# Patient Record
Sex: MALE | Race: BLACK OR AFRICAN AMERICAN | NOT HISPANIC OR LATINO | Employment: FULL TIME | URBAN - METROPOLITAN AREA
[De-identification: names, ages, dates, MRNs, and addresses within clinical notes are randomized per-mention and may not be internally consistent; named-entity substitution may affect disease eponyms.]

---

## 2018-03-22 ENCOUNTER — HOSPITAL ENCOUNTER (OUTPATIENT)
Dept: RADIOLOGY | Facility: HOSPITAL | Age: 63
Discharge: HOME | End: 2018-03-22
Attending: ORTHOPAEDIC SURGERY
Payer: COMMERCIAL

## 2018-03-22 VITALS
DIASTOLIC BLOOD PRESSURE: 94 MMHG | BODY MASS INDEX: 31.16 KG/M2 | HEIGHT: 68 IN | SYSTOLIC BLOOD PRESSURE: 145 MMHG | WEIGHT: 205.6 LBS

## 2018-03-22 DIAGNOSIS — M25.462 EFFUSION OF LEFT KNEE: ICD-10-CM

## 2018-03-22 DIAGNOSIS — M25.562 LEFT KNEE PAIN, UNSPECIFIED CHRONICITY: Primary | ICD-10-CM

## 2018-03-22 DIAGNOSIS — M17.12 PRIMARY OSTEOARTHRITIS OF LEFT KNEE: Primary | ICD-10-CM

## 2018-03-22 DIAGNOSIS — M25.562 LEFT KNEE PAIN, UNSPECIFIED CHRONICITY: ICD-10-CM

## 2018-03-22 DIAGNOSIS — M17.12 PRIMARY OSTEOARTHRITIS OF LEFT KNEE: ICD-10-CM

## 2018-03-22 PROCEDURE — 20610 DRAIN/INJ JOINT/BURSA W/O US: CPT | Performed by: PHYSICIAN ASSISTANT

## 2018-03-22 PROCEDURE — 99202 OFFICE O/P NEW SF 15 MIN: CPT | Performed by: ORTHOPAEDIC SURGERY

## 2018-03-22 PROCEDURE — 73562 X-RAY EXAM OF KNEE 3: CPT

## 2018-03-22 RX ORDER — LIDOCAINE HYDROCHLORIDE 10 MG/ML
2 INJECTION, SOLUTION INFILTRATION; PERINEURAL
Status: COMPLETED | OUTPATIENT
Start: 2018-03-22 | End: 2018-03-22

## 2018-03-22 RX ORDER — AMLODIPINE AND OLMESARTAN MEDOXOMIL 10; 40 MG/1; MG/1
TABLET ORAL
Refills: 5 | COMMUNITY
Start: 2018-03-18 | End: 2019-01-10

## 2018-03-22 RX ORDER — BETAMETHASONE SODIUM PHOSPHATE AND BETAMETHASONE ACETATE 3; 3 MG/ML; MG/ML
12 INJECTION, SUSPENSION INTRA-ARTICULAR; INTRALESIONAL; INTRAMUSCULAR; SOFT TISSUE
Status: COMPLETED | OUTPATIENT
Start: 2018-03-22 | End: 2018-03-22

## 2018-03-22 RX ORDER — ALLOPURINOL 100 MG/1
TABLET ORAL
Refills: 3 | COMMUNITY
Start: 2017-12-17 | End: 2019-01-10

## 2018-03-22 RX ORDER — BUPIVACAINE HYDROCHLORIDE 2.5 MG/ML
2 INJECTION, SOLUTION INFILTRATION; PERINEURAL
Status: COMPLETED | OUTPATIENT
Start: 2018-03-22 | End: 2018-03-22

## 2018-03-22 RX ORDER — SIMVASTATIN 20 MG
TABLET ORAL
Refills: 2 | COMMUNITY
Start: 2018-03-18 | End: 2019-01-10

## 2018-03-22 RX ORDER — CARVEDILOL 25 MG/1
TABLET ORAL
Refills: 6 | COMMUNITY
Start: 2018-03-01 | End: 2019-01-10

## 2018-03-22 RX ADMIN — LIDOCAINE HYDROCHLORIDE 2 ML: 10 INJECTION, SOLUTION INFILTRATION; PERINEURAL at 15:19

## 2018-03-22 RX ADMIN — BETAMETHASONE SODIUM PHOSPHATE AND BETAMETHASONE ACETATE 12 MG: 3; 3 INJECTION, SUSPENSION INTRA-ARTICULAR; INTRALESIONAL; INTRAMUSCULAR; SOFT TISSUE at 15:19

## 2018-03-22 RX ADMIN — BUPIVACAINE HYDROCHLORIDE 2 ML: 2.5 INJECTION, SOLUTION INFILTRATION; PERINEURAL at 15:19

## 2018-03-22 NOTE — PROGRESS NOTES
62 y o male care of left knee pain anteromedially for several months  He denies a specific attributable trauma  He has had a solitary injection which was placed from a lateral approach he felt it may of work better if it was provided from a medial approach  His notation from [facility] describes relief from the injection,   However the patient subjectively states the had continued pain and his pain relief was minimal   He states that he has had no past surgery of record to the left knee  X-rays were performed of the left knee on arrival to the office today    Review of Systems  Review of systems negative unless otherwise specified in HPI    Past Medical History  Past Medical History:   Diagnosis Date    Diabetes insipidus ([facility] [address] )     Gout     Hypertension        Past Surgical History  Past Surgical History:   Procedure Laterality Date    GALLBLADDER SURGERY      HEMORRHOID SURGERY         Current Medications  No current outpatient prescriptions on file prior to visit  No current facility-administered medications on file prior to visit  Recent Labs (HCT,HGB,PT,INR,ESR,CRP,GLU,HgA1C)  No results found for: HCT, HGB, WBC, PT, INR, ESR, CRP, GLUCOSE, HGBA1C      Physical exam  · General: Awake, Alert, Oriented  · Eyes: Pupils equal, round and reactive to light  · Heart: regular rate and rhythm  · Lungs: No audible wheezing  · Abdomen: soft  Musculoskeletal  With his trousers removed and professionally draped he is able to extend both legs    Immediately noted is increased size left thigh left knee compared to the right  Both knees actively extend both knees can flex greater than 95°  He has palpable medial joint line pain or tenderness without a significant effusion left knee he has a negative Apley's grind test he has a plus-minus Moose's test   He has a negative Lachman's and negative drawers test   Additionally he exhibits varus angulation by inspection and exam         Imaging  X-rays left knee three views show subchondral sclerosis medial greater than lateral medial compartment joint space narrowing, and on the Merchant view osteophytic formation in the patellofemoral joint    Procedure  Large joint arthrocentesis  Date/Time: 3/22/2018 3:19 PM  Consent given by: patient  Supporting Documentation  Indications: pain   Procedure Details  Location: knee - L knee  Needle size: 22 G  Ultrasound guidance: no  Approach: lateral  Medications administered: 2 mL bupivacaine 0 25 %; 2 mL lidocaine 1 %; 12 mg betamethasone acetate-betamethasone sodium phosphate 6 (3-3) mg/mL    Aspirate amount: 25 mL  Aspirate: clear and yellow  Patient tolerance: patient tolerated the procedure well with no immediate complications  Dressing:  Sterile dressing applied          Assessment/Plan:   62 y o male with chronic left knee pain,  Arthritic left knee    Plan;  He underwent aspiration and injection of the left knee  25 cc of clear lindsey fluid was obtained and then through same needle technique the knee was injected with steroid  We will observe the relief that he obtains from this the simplest procedure  He understands that if this continues to give him pain discomfort and malalignment that he may require or desire operative intervention  The perioperative  Surrounding knee replacement arthroplasty was discussed by the attending surgeon    This completed the gentleman's care  He is permitted exited the office  It was my privilege to assist the attending surgeon in the delivery of his care

## 2018-03-22 NOTE — PATIENT INSTRUCTIONS
Your left knee is arthritic and had fluid  The fluid was drained from the knee and an injection of steroid was placed      At your request and after discussion a medial  brace was ordered    Your decision to avoid operative intervention is respected    We will see you in follow-up in the future

## 2018-03-30 NOTE — TELEPHONE ENCOUNTER
The day this was ordered I discussed this with Perico Giron    We will need to provide follow-up for this patient and his wife    Thank you,

## 2018-03-30 NOTE — TELEPHONE ENCOUNTER
Patients wife is calling wondering about his knee brace  Does it go through the office or somewhere else? Patient has not heard anything from anyone regarding it  Darwin pt

## 2018-04-03 NOTE — TELEPHONE ENCOUNTER
Spoke to JODI Colon, and she said pt came in yesterday and that she spoke with him and it is pending

## 2018-04-09 RX ORDER — NAPROXEN 500 MG/1
TABLET ORAL
Refills: 1 | COMMUNITY
Start: 2018-04-05

## 2018-04-09 RX ORDER — FUROSEMIDE 20 MG/1
TABLET ORAL
Refills: 5 | COMMUNITY
Start: 2018-04-05 | End: 2019-01-10

## 2018-04-10 VITALS
BODY MASS INDEX: 31.17 KG/M2 | HEART RATE: 80 BPM | WEIGHT: 205.69 LBS | HEIGHT: 68 IN | DIASTOLIC BLOOD PRESSURE: 82 MMHG | SYSTOLIC BLOOD PRESSURE: 130 MMHG

## 2018-04-10 DIAGNOSIS — M25.462 EFFUSION OF LEFT KNEE: ICD-10-CM

## 2018-04-10 DIAGNOSIS — M17.12 PRIMARY OSTEOARTHRITIS OF LEFT KNEE: ICD-10-CM

## 2018-04-10 DIAGNOSIS — M25.562 LEFT KNEE PAIN, UNSPECIFIED CHRONICITY: Primary | ICD-10-CM

## 2018-04-10 PROCEDURE — 99213 OFFICE O/P EST LOW 20 MIN: CPT | Performed by: ORTHOPAEDIC SURGERY

## 2018-04-10 PROCEDURE — 20610 DRAIN/INJ JOINT/BURSA W/O US: CPT

## 2018-04-10 RX ORDER — BUPIVACAINE HYDROCHLORIDE 2.5 MG/ML
2 INJECTION, SOLUTION INFILTRATION; PERINEURAL
Status: COMPLETED | OUTPATIENT
Start: 2018-04-10 | End: 2018-04-10

## 2018-04-10 RX ORDER — BETAMETHASONE SODIUM PHOSPHATE AND BETAMETHASONE ACETATE 3; 3 MG/ML; MG/ML
12 INJECTION, SUSPENSION INTRA-ARTICULAR; INTRALESIONAL; INTRAMUSCULAR; SOFT TISSUE
Status: COMPLETED | OUTPATIENT
Start: 2018-04-10 | End: 2018-04-10

## 2018-04-10 RX ORDER — LIDOCAINE HYDROCHLORIDE 10 MG/ML
2 INJECTION, SOLUTION INFILTRATION; PERINEURAL
Status: COMPLETED | OUTPATIENT
Start: 2018-04-10 | End: 2018-04-10

## 2018-04-10 RX ADMIN — BETAMETHASONE SODIUM PHOSPHATE AND BETAMETHASONE ACETATE 12 MG: 3; 3 INJECTION, SUSPENSION INTRA-ARTICULAR; INTRALESIONAL; INTRAMUSCULAR; SOFT TISSUE at 14:19

## 2018-04-10 RX ADMIN — LIDOCAINE HYDROCHLORIDE 2 ML: 10 INJECTION, SOLUTION INFILTRATION; PERINEURAL at 14:19

## 2018-04-10 RX ADMIN — BUPIVACAINE HYDROCHLORIDE 2 ML: 2.5 INJECTION, SOLUTION INFILTRATION; PERINEURAL at 14:19

## 2018-04-10 NOTE — PROGRESS NOTES
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)  His left knee symptoms do limit his daily activities and impede on his quality of life  His pain continues to be medially  A knee brace was discussed at his previous visit but he did not receive it  Review of Systems  Review of systems negative unless otherwise specified in HPI    Past Medical History  Past Medical History:   Diagnosis Date    Diabetes insipidus ([address] )     Gout     Hypertension        Past Surgical History  Past Surgical History:   Procedure Laterality Date    GALLBLADDER SURGERY      HEMORRHOID SURGERY         Current Medications  Current Outpatient Prescriptions on File Prior to Visit   Medication Sig Dispense Refill    allopurinol (ZYLOPRIM) 100 mg tablet TK 1 T PO  BID  3    amlodipine-olmesartan (KALIN) 10-40 MG TK 1 T PO QD  5    carvedilol (COREG) 25 mg tablet TK 1 T PO BID WITH FOOD  6    metFORMIN (GLUCOPHAGE) 500 mg tablet TK 1 T PO D  5    simvastatin (ZOCOR) 20 mg tablet TK 1 T PO QD IN THE EVENING AFTER SUPPER  2     No current facility-administered medications on file prior to visit  Recent Labs (HCT,HGB,PT,INR,ESR,CRP,GLU,HgA1C)  No results found for: HCT, HGB, WBC, PT, INR, ESR, CRP, GLUCOSE, HGBA1C      Physical exam  · General: Awake, Alert, Oriented  · Eyes: Pupils equal, round and reactive to light  · Heart: regular rate and rhythm  · Lungs: No audible wheezing  · Abdomen: soft  left Knee exam  · Skin intact, moderate effusion, medial joint line tenderness, ROM 0-110 passively to 120 with pain  Mild patella crepitus   5/5 strength  · No calf tenderness, no evidence of infection or DVT  · No ligamentous laxity, negative mcmurrays/appleys    Procedure   Large joint arthrocentesis  Date/Time: 4/10/2018 2:19 PM  Consent given by: patient  Site marked: site marked  Supporting Documentation  Indications: pain and joint swelling   Procedure Details  Location: knee - L knee  Ultrasound guidance: no  Approach: lateral  Medications administered: 2 mL bupivacaine 0 25 %; 2 mL lidocaine 1 %; 12 mg betamethasone acetate-betamethasone sodium phosphate 6 (3-3) mg/mL    Aspirate amount: 60 mL  Aspirate: clear, yellow and blood-tinged  Patient tolerance: patient tolerated the procedure well with no immediate complications  Dressing:  Sterile dressing applied        Imaging  No new x-rays taken    Assessment:   62 y o male with left knee known OA, effusion and pain  Left knee repeated aspiration and cortisone injection performed today  Visco discussed however due to his deformity and extensive OA his outcome may be questionable and not favorable   Total knee candidate  ICE as instructed     Plan  · Weightbearing: as tolerated  · Activity: as tolerated  · Will discuss a total knee further at his next appointment  · Follow-up: as previously scheduled for 4/26/18   ·

## 2018-04-26 VITALS
WEIGHT: 205.2 LBS | DIASTOLIC BLOOD PRESSURE: 86 MMHG | HEIGHT: 68 IN | SYSTOLIC BLOOD PRESSURE: 124 MMHG | HEART RATE: 93 BPM | BODY MASS INDEX: 31.1 KG/M2

## 2018-04-26 DIAGNOSIS — M17.12 PRIMARY OSTEOARTHRITIS OF LEFT KNEE: ICD-10-CM

## 2018-04-26 DIAGNOSIS — M25.462 EFFUSION OF LEFT KNEE: ICD-10-CM

## 2018-04-26 DIAGNOSIS — M17.10 ARTHRITIS OF KNEE: Primary | ICD-10-CM

## 2018-04-26 PROCEDURE — 20610 DRAIN/INJ JOINT/BURSA W/O US: CPT | Performed by: ORTHOPAEDIC SURGERY

## 2018-04-26 PROCEDURE — 99213 OFFICE O/P EST LOW 20 MIN: CPT | Performed by: ORTHOPAEDIC SURGERY

## 2018-04-26 RX ORDER — BUPIVACAINE HYDROCHLORIDE 2.5 MG/ML
2 INJECTION, SOLUTION INFILTRATION; PERINEURAL
Status: COMPLETED | OUTPATIENT
Start: 2018-04-26 | End: 2018-04-26

## 2018-04-26 RX ORDER — FERROUS SULFATE TAB EC 324 MG (65 MG FE EQUIVALENT) 324 (65 FE) MG
324 TABLET DELAYED RESPONSE ORAL
Qty: 60 TABLET | Refills: 0 | Status: SHIPPED | OUTPATIENT
Start: 2018-04-26 | End: 2019-01-10

## 2018-04-26 RX ORDER — LANOLIN ALCOHOL/MO/W.PET/CERES
400 CREAM (GRAM) TOPICAL DAILY
Qty: 30 TABLET | Refills: 0 | Status: SHIPPED | OUTPATIENT
Start: 2018-04-26 | End: 2018-10-03

## 2018-04-26 RX ADMIN — BUPIVACAINE HYDROCHLORIDE 2 ML: 2.5 INJECTION, SOLUTION INFILTRATION; PERINEURAL at 15:14

## 2018-04-26 NOTE — PROGRESS NOTES
63 y o male presents for repeat evaluation of right knee osteoarthritis  Patient notes that previous corticosteroid injection failed to provide any significant long-term relief  He presents today with a large knee effusion requesting surgical intervention    Review of Systems  Review of systems negative unless otherwise specified in HPI    Past Medical History  Past Medical History:   Diagnosis Date    Diabetes insipidus ([address] )     Gout     Hypertension        Past Surgical History  Past Surgical History:   Procedure Laterality Date    GALLBLADDER SURGERY      HEMORRHOID SURGERY         Current Medications  Current Outpatient Prescriptions on File Prior to Visit   Medication Sig Dispense Refill    allopurinol (ZYLOPRIM) 100 mg tablet TK 1 T PO  BID  3    amlodipine-olmesartan (KALIN) 10-40 MG TK 1 T PO QD  5    carvedilol (COREG) 25 mg tablet TK 1 T PO BID WITH FOOD  6    furosemide (LASIX) 20 mg tablet TK 1 T PO  D  5    metFORMIN (GLUCOPHAGE) 500 mg tablet TK 1 T PO D  5    naproxen (NAPROSYN) 500 mg tablet TK 1 T PO  D AFTER FOOD  1    simvastatin (ZOCOR) 20 mg tablet TK 1 T PO QD IN THE EVENING AFTER SUPPER  2     No current facility-administered medications on file prior to visit          Recent Labs (HCT,HGB,PT,INR,ESR,CRP,GLU,HgA1C)  No results found for: HCT, HGB, WBC, PT, INR, ESR, CRP, GLUCOSE, HGBA1C      Physical exam  · General: Awake, Alert, Oriented  · Eyes: Pupils equal, round and reactive to light  · Heart: regular rate and rhythm  · Lungs: No audible wheezing  · Abdomen: soft  left Knee exam  · Tender to palpation over medial lateral joint line  · Extension 0° flexion 150°  · Stable to varus valgus stress negative lock negative posterior drawer  · Large effusion appreciated  · Sensation grossly intact the entirety of extremity    Procedure  Large joint arthrocentesis  Date/Time: 4/26/2018 3:14 PM  Consent given by: patient  Supporting Documentation  Indications: pain   Procedure Details  Location: knee - L knee ([address])  Needle size: 16 G  Medications administered: 2 mL bupivacaine 0 25 %    Aspirate amount: 90 mL              Imaging  No new imaging    Assessment:   62 y o male with severe left knee osteoarthritis refractory to non operative treatment    Plan  · Weightbearing:   As tolerated  · Activity:  As tolerated  · Patient will be scheduled for left total knee arthroplasty  · Follow-up: 1 week postop

## 2018-05-04 ENCOUNTER — TELEPHONE (OUTPATIENT)
Dept: PREADMISSION TESTING | Facility: HOSPITAL | Age: 63
End: 2018-05-04

## 2018-05-16 NOTE — TELEPHONE ENCOUNTER
I would be available drain this patient's knee after his class  If you would like to given appointment for [phone] tomorrow that would allow was to achieve this goal   Please call patient and inform of above   Thank you

## 2018-05-16 NOTE — TELEPHONE ENCOUNTER
Rafael Munoz    Patient wanted to schedule appt to drain knee tomorrow 5/17/18 but he has 2-classes for otho training scheduled, one at 11:00 am another at 2:30pm  He was not aware of 2 times and also was told 1pm for class  Would like call back to understand what is needed and also take care of knee issue   Thanks

## 2018-05-17 ENCOUNTER — APPOINTMENT (OUTPATIENT)
Dept: LAB | Facility: HOSPITAL | Age: 63
End: 2018-05-17
Payer: COMMERCIAL

## 2018-05-17 ENCOUNTER — OFFICE VISIT (OUTPATIENT)
Dept: LAB | Facility: HOSPITAL | Age: 63
End: 2018-05-17
Attending: ORTHOPAEDIC SURGERY
Payer: COMMERCIAL

## 2018-05-17 VITALS
BODY MASS INDEX: 31.11 KG/M2 | DIASTOLIC BLOOD PRESSURE: 87 MMHG | WEIGHT: 205.25 LBS | HEIGHT: 68 IN | SYSTOLIC BLOOD PRESSURE: 147 MMHG | HEART RATE: 82 BPM

## 2018-05-17 DIAGNOSIS — G89.29 CHRONIC PAIN OF LEFT KNEE: Primary | ICD-10-CM

## 2018-05-17 DIAGNOSIS — M25.562 CHRONIC PAIN OF LEFT KNEE: Primary | ICD-10-CM

## 2018-05-17 DIAGNOSIS — M17.12 PRIMARY OSTEOARTHRITIS OF LEFT KNEE: ICD-10-CM

## 2018-05-17 DIAGNOSIS — M17.10 ARTHRITIS OF KNEE: ICD-10-CM

## 2018-05-17 DIAGNOSIS — M25.462 EFFUSION OF LEFT KNEE: ICD-10-CM

## 2018-05-17 LAB
ABO GROUP BLD: NORMAL
ANION GAP SERPL CALCULATED.3IONS-SCNC: 7 MMOL/L (ref 4–13)
APTT PPP: 25 SECONDS (ref 24–36)
ATRIAL RATE: 81 BPM
BACTERIA UR QL AUTO: ABNORMAL /HPF
BASOPHILS # BLD AUTO: 0.01 THOUSANDS/ΜL (ref 0–0.1)
BASOPHILS NFR BLD AUTO: 0 % (ref 0–1)
BILIRUB UR QL STRIP: NEGATIVE
BLD GP AB SCN SERPL QL: NEGATIVE
BUN SERPL-MCNC: 17 MG/DL (ref 5–25)
CALCIUM SERPL-MCNC: 9.2 MG/DL (ref 8.3–10.1)
CHLORIDE SERPL-SCNC: 105 MMOL/L (ref 100–108)
CLARITY UR: CLEAR
CO2 SERPL-SCNC: 28 MMOL/L (ref 21–32)
COLOR UR: YELLOW
CREAT SERPL-MCNC: 0.92 MG/DL (ref 0.6–1.3)
EOSINOPHIL # BLD AUTO: 0.1 THOUSAND/ΜL (ref 0–0.61)
EOSINOPHIL NFR BLD AUTO: 2 % (ref 0–6)
ERYTHROCYTE [DISTWIDTH] IN BLOOD BY AUTOMATED COUNT: 12.2 % (ref 11.6–15.1)
EST. AVERAGE GLUCOSE BLD GHB EST-MCNC: 117 MG/DL
GFR SERPL CREATININE-BSD FRML MDRD: 104 ML/MIN/1.73SQ M
GLUCOSE P FAST SERPL-MCNC: 104 MG/DL (ref 65–99)
GLUCOSE UR STRIP-MCNC: NEGATIVE MG/DL
HBA1C MFR BLD: 5.7 % (ref 4.2–6.3)
HCT VFR BLD AUTO: 36.9 % (ref 36.5–49.3)
HGB BLD-MCNC: 12.8 G/DL (ref 12–17)
HGB UR QL STRIP.AUTO: NEGATIVE
HYALINE CASTS #/AREA URNS LPF: ABNORMAL /LPF
IMM GRANULOCYTES # BLD AUTO: 0.01 THOUSAND/UL (ref 0–0.2)
IMM GRANULOCYTES NFR BLD AUTO: 0 % (ref 0–2)
INR PPP: 1.08 (ref 0.86–1.09)
KETONES UR STRIP-MCNC: NEGATIVE MG/DL
LEUKOCYTE ESTERASE UR QL STRIP: ABNORMAL
LYMPHOCYTES # BLD AUTO: 1.93 THOUSANDS/ΜL (ref 0.6–4.47)
LYMPHOCYTES NFR BLD AUTO: 35 % (ref 14–44)
MCH RBC QN AUTO: 32.2 PG (ref 26.8–34.3)
MCHC RBC AUTO-ENTMCNC: 34.7 G/DL (ref 31.4–37.4)
MCV RBC AUTO: 93 FL (ref 82–98)
MONOCYTES # BLD AUTO: 0.39 THOUSAND/ΜL (ref 0.17–1.22)
MONOCYTES NFR BLD AUTO: 7 % (ref 4–12)
NEUTROPHILS # BLD AUTO: 3.08 THOUSANDS/ΜL (ref 1.85–7.62)
NEUTS SEG NFR BLD AUTO: 56 % (ref 43–75)
NITRITE UR QL STRIP: NEGATIVE
NON-SQ EPI CELLS URNS QL MICRO: ABNORMAL /HPF
NRBC BLD AUTO-RTO: 0 /100 WBCS
P AXIS: 57 DEGREES
PH UR STRIP.AUTO: 6 [PH] (ref 4.5–8)
PLATELET # BLD AUTO: 195 THOUSANDS/UL (ref 149–390)
PMV BLD AUTO: 10.5 FL (ref 8.9–12.7)
POTASSIUM SERPL-SCNC: 3.7 MMOL/L (ref 3.5–5.3)
PR INTERVAL: 156 MS
PROT UR STRIP-MCNC: NEGATIVE MG/DL
PROTHROMBIN TIME: 14.1 SECONDS (ref 11.8–14.2)
QRS AXIS: 30 DEGREES
QRSD INTERVAL: 78 MS
QT INTERVAL: 360 MS
QTC INTERVAL: 418 MS
RBC # BLD AUTO: 3.98 MILLION/UL (ref 3.88–5.62)
RBC #/AREA URNS AUTO: ABNORMAL /HPF
RH BLD: POSITIVE
SODIUM SERPL-SCNC: 140 MMOL/L (ref 136–145)
SP GR UR STRIP.AUTO: 1.02 (ref 1–1.03)
SPECIMEN EXPIRATION DATE: NORMAL
T WAVE AXIS: 11 DEGREES
UROBILINOGEN UR QL STRIP.AUTO: 0.2 E.U./DL
VENTRICULAR RATE: 81 BPM
WBC # BLD AUTO: 5.52 THOUSAND/UL (ref 4.31–10.16)
WBC #/AREA URNS AUTO: ABNORMAL /HPF

## 2018-05-17 PROCEDURE — 85730 THROMBOPLASTIN TIME PARTIAL: CPT

## 2018-05-17 PROCEDURE — 81001 URINALYSIS AUTO W/SCOPE: CPT

## 2018-05-17 PROCEDURE — 85610 PROTHROMBIN TIME: CPT

## 2018-05-17 PROCEDURE — 93010 ELECTROCARDIOGRAM REPORT: CPT | Performed by: INTERNAL MEDICINE

## 2018-05-17 PROCEDURE — 83036 HEMOGLOBIN GLYCOSYLATED A1C: CPT

## 2018-05-17 PROCEDURE — 93005 ELECTROCARDIOGRAM TRACING: CPT

## 2018-05-17 PROCEDURE — 86901 BLOOD TYPING SEROLOGIC RH(D): CPT

## 2018-05-17 PROCEDURE — 36415 COLL VENOUS BLD VENIPUNCTURE: CPT

## 2018-05-17 PROCEDURE — 87086 URINE CULTURE/COLONY COUNT: CPT

## 2018-05-17 PROCEDURE — 80048 BASIC METABOLIC PNL TOTAL CA: CPT

## 2018-05-17 PROCEDURE — 99213 OFFICE O/P EST LOW 20 MIN: CPT | Performed by: ORTHOPAEDIC SURGERY

## 2018-05-17 PROCEDURE — 86850 RBC ANTIBODY SCREEN: CPT

## 2018-05-17 PROCEDURE — 85025 COMPLETE CBC W/AUTO DIFF WBC: CPT

## 2018-05-17 PROCEDURE — 86900 BLOOD TYPING SEROLOGIC ABO: CPT

## 2018-05-17 PROCEDURE — 20610 DRAIN/INJ JOINT/BURSA W/O US: CPT | Performed by: ORTHOPAEDIC SURGERY

## 2018-05-17 RX ORDER — ASPIRIN 81 MG/1
81 TABLET ORAL EVERY OTHER DAY
COMMUNITY

## 2018-05-17 NOTE — TELEPHONE ENCOUNTER
Per Dr Britton Mei request, Left V/M on his mobile for 3:30 appt to drain knee since he was already in class

## 2018-05-17 NOTE — PRE-PROCEDURE INSTRUCTIONS
Pre-Surgery Instructions:   Medication Instructions    allopurinol (ZYLOPRIM) 100 mg tablet Instructed patient per Anesthesia Guidelines   amlodipine-olmesartan (KALIN) 10-40 MG Instructed patient per Anesthesia Guidelines   ascorbic Acid (VITAMIN C) 500 MG CPCR Patient was instructed by Physician and understands   carvedilol (COREG) 25 mg tablet Instructed patient per Anesthesia Guidelines   ferrous sulfate 324 (65 Fe) mg Patient was instructed by Physician and understands   folic acid (FOLVITE) 546 mcg tablet Patient was instructed by Physician and understands   furosemide (LASIX) 20 mg tablet Instructed patient per Anesthesia Guidelines   metFORMIN (GLUCOPHAGE) 500 mg tablet Instructed patient per Anesthesia Guidelines   naproxen (NAPROSYN) 500 mg tablet Instructed patient per Anesthesia Guidelines   simvastatin (ZOCOR) 20 mg tablet Instructed patient per Anesthesia Guidelines  Saw pt for PAT appt  Medication list reviewed & instructed  As of 6 13 18 pt to stop naproxen and aspirin  Instructed on tylenol only  Last dose of kalin 6 18 18  Pt has pre-op vit script, will start 3 weeks prior to sx and continue folic acid, vit C, iron until 6 19 18  Am DOS pt to take carvedilol  Showering instructions & cloths given by office  Soap and incentive given at appt  All instructions reviewed & verbally understood by patient  Pt will check ortho folder at home for instructions to make [facility] appt, lovenox script, and pre-op PT appt -- will f/u and confirm pt has all  Pt reported he takes aspirin 81 mg daily  See instructions above  ACE/ARB Med Class     Do not take this medication the day before and the morning of the day of surgery/procedure  Diuretic Med Class     Continue this medication up to the evening before surgery/procedure, but do not take the morning of the day of surgery  Anti-gout Med Class     Continue to take this medication on your normal schedule    If this is an oral medication and you take it in the morning, then you may take this medicine with a sip of water  Beta blocker Med Class     Continue to take this heart medication on your normal schedule  If this is an oral medication and you take it in the morning, then you may take this medicine with a sip of water  Calcium Channel Blocker Med Class     Continue to take this heart medication on your normal schedule  If this is an oral medication and you take it in the morning, then you may take this medicine with a sip of water  Low Molecular Weight Heparin Med Class     Stop taking this medication at least 12-24 hours prior to surgery/procedure with prescribing Physician and Surgeon consultation  Insulin Med Class     Day of Procedure     Do not take ANY diabetic medication the day of your procedure unless otherwise instructed by the Physician who manages your diabetic medications  Continue to check your blood sugars      If you have an insulin pump then consult with your Endocrinologist for instructions  If you cannot see your Endocrinologist then set your insulin pump on day of procedure to your basal rate only  Please bring your insulin pump supplies to the hospital if being admitted      Day Prior to Procedure     1  Procedure that does not require a bowel prep     Pre-Mixed Insulin (Intermediate Acting:  Humalog 75/25, Humulin 70/30, Novolog 70/30, Regular Insulin)              o Take ½ your regular dose the evening prior to procedure  Rapid/Fast Acting Insulin/Long Acting Insulin (Humalog U200, NovoLog, Apidra, Lantus, Levemir, Tresiba, Toujeo)              o Take your FULL regular dose the day before procedure      Oral Diabetic Medications including Glipizide/Glimepiride/Glucotrol(sulfonylurea) and SGLT2 Inhibitors canagliflozin/emagliflozin (Invokana, Jardiance)              o Take your regular dose the evening prior to procedure with dinner      2    Colonoscopy or Procedure that requires a bowel prep (clear liquid diet day prior)     Pre-Mixed Insulin (Intermediate Acting:  Humalog 75/25, Humulin 70/30, Novolog 70/30, Regular Insulin)              o Take ½ your regular dose the evening prior to procedure      Rapid/Fast Acting Insulin (Humalog U200, NovoLog, Apidra)              o Take ½ your regular dose the evening prior to procedure      Long Acting Insulin (Lantus, Levemir, Kelly Ronald)              o Take your FULL regular dose the day before procedure      Oral Glipizide/Glimepiride/Glucotrol  (sulfonylurea)              o Take ½ your regular dose the evening prior to procedure      Oral Diabetic Medications NOT Glipizide/Glimepiride/Glucotrol              o Take your regular dose the evening prior to procedure with dinner      NSAID Med Class     Stop taking this medication at least 3 days prior to surgery/procedure  Statin Med Class     Continue to take this medication on your normal schedule  If this is an oral medication and you take it in the morning, then you may take this medicine with a sip of water

## 2018-05-17 NOTE — PROGRESS NOTES
63 y o male presents for aspiration of his effused left knee  He describes pain and swelling in his left knee    Review of Systems  Review of systems negative unless otherwise specified in HPI    Past Medical History  Past Medical History:   Diagnosis Date    Diabetes mellitus ([address] )     Gout     Hyperlipidemia     Hypertension        Past Surgical History  Past Surgical History:   Procedure Laterality Date    COLONOSCOPY      GALLBLADDER SURGERY      HEMORRHOID SURGERY         Current Medications  Current Outpatient Prescriptions on File Prior to Visit   Medication Sig Dispense Refill    allopurinol (ZYLOPRIM) 100 mg tablet TK 1 T PO  BID  3    amlodipine-olmesartan (KALIN) 10-40 MG TK 1 T PO QD  5    ascorbic Acid (VITAMIN C) 500 MG CPCR Take 1 capsule (500 mg total) by mouth daily 30 capsule 0    aspirin (ECOTRIN LOW STRENGTH) 81 mg EC tablet Take 81 mg by mouth daily      carvedilol (COREG) 25 mg tablet TK 1 T PO BID WITH FOOD  6    enoxaparin (LOVENOX) 40 mg/0 4 mL Inject 0 4 mL (40 mg total) under the skin daily in the early morning for 30 days To be started PostOp 12 mL 0    ferrous sulfate 324 (65 Fe) mg Take 1 tablet (324 mg total) by mouth 2 (two) times a day before meals 60 tablet 0    folic acid (FOLVITE) 626 mcg tablet Take 1 tablet (400 mcg total) by mouth daily 30 tablet 0    furosemide (LASIX) 20 mg tablet TK 1 T PO  D  5    metFORMIN (GLUCOPHAGE) 500 mg tablet TK 1 T PO D  5    naproxen (NAPROSYN) 500 mg tablet TK 1 T PO  D AFTER FOOD  prn  1    simvastatin (ZOCOR) 20 mg tablet TK 1 T PO QD IN THE EVENING AFTER SUPPER  2     No current facility-administered medications on file prior to visit          Recent Labs (HCT,HGB,PT,INR,ESR,CRP,GLU,HgA1C)    0  Lab Value Date/Time   HCT 36 9 05/17/2018 1230   HGB 12 8 05/17/2018 1230   WBC 5 52 05/17/2018 1230   INR 1 08 05/17/2018 1230   HGBA1C 5 7 05/17/2018 1230         Physical exam  · General: Awake, Alert, Oriented  · Eyes: Pupils equal, round and reactive to light  · Heart: regular rate and rhythm  · Lungs: No audible wheezing  · Abdomen: soft  Exam finds large left knee effusion  There is no warmth  There is bony enlargement tenderness medially at the knee  There is no tenderness in the left calf  Imaging  X-rays show arthritic left knee    Procedure  An aspiration 90 cc of fluid was provided today  It is documented below    Large joint arthrocentesis  Date/Time: 5/17/2018 4:42 PM  Consent given by: patient  Supporting Documentation  Indications: pain and joint swelling   Procedure Details  Location: knee - L knee  Preparation: Patient was prepped and draped in the usual sterile fashion    Aspirate amount: 90 mL  Aspirate: clear and yellow  Patient tolerance: patient tolerated the procedure well with no immediate complications  Dressing:  Sterile dressing applied          Assessment/Plan:   62 y o male who has an effused left knee with underlying arthritis  He would benefit from aspiration to relieve his pressure symptoms  The aspiration is provided, I would welcome the opportunity see back in the office as a postoperative patient    He is already scheduled for elective left total knee replacement

## 2018-05-18 LAB — BACTERIA UR CULT: NORMAL

## 2018-05-23 ENCOUNTER — TELEPHONE (OUTPATIENT)
Dept: OBGYN CLINIC | Facility: HOSPITAL | Age: 63
End: 2018-05-23

## 2018-05-23 NOTE — TELEPHONE ENCOUNTER
Call from Elise Sweeney, with Dr Lucia Hinds office   Call back # [phone]  Fax # [phone]  Dr Larry Carvalho   Scheduled surgery 06/20/18 KNEE : COMPLETE REPLACEMENT (Left Knee)     Dr Parul Matthesw would like to know if the office uses a specific type of clearance form for surgery if so please fax to the number above

## 2018-06-06 ENCOUNTER — TELEPHONE (OUTPATIENT)
Dept: OBGYN CLINIC | Facility: HOSPITAL | Age: 63
End: 2018-06-06

## 2018-06-11 ENCOUNTER — TRANSCRIBE ORDERS (OUTPATIENT)
Dept: ADMINISTRATIVE | Facility: HOSPITAL | Age: 63
End: 2018-06-11

## 2018-06-11 DIAGNOSIS — Z01.818 PREOP EXAMINATION: Primary | ICD-10-CM

## 2018-06-12 ENCOUNTER — HOSPITAL ENCOUNTER (OUTPATIENT)
Dept: NON INVASIVE DIAGNOSTICS | Facility: CLINIC | Age: 63
Discharge: HOME | End: 2018-06-12
Payer: COMMERCIAL

## 2018-06-12 DIAGNOSIS — Z01.818 PREOP EXAMINATION: ICD-10-CM

## 2018-06-12 PROCEDURE — 93306 TTE W/DOPPLER COMPLETE: CPT | Performed by: INTERNAL MEDICINE

## 2018-06-12 PROCEDURE — 93306 TTE W/DOPPLER COMPLETE: CPT

## 2018-06-13 ENCOUNTER — TELEPHONE (OUTPATIENT)
Dept: OBGYN CLINIC | Facility: HOSPITAL | Age: 63
End: 2018-06-13

## 2018-06-13 NOTE — TELEPHONE ENCOUNTER
Pt returned my call for elective admission assessment  Pt lives in a multi-level home with his wife  Pt reporting that his wife works daily until around 3 or 4pm- pt reports his wife and son are only close family and they both work day shift  Per pt, his wife will assist pt in the AM prior to leaving and again in the evening when she returns  Pt also has limited post-op transportation because of his wife's work schedule- wife is unable to take off work  For this reason, patient is requesting home PT  I explained the many benefits of outpt PT- pt still requesting home PT when [facility] if at all possible  Pt's preferred outpt PT site is  [facility]  Pt has 4 steps from his porch into the home  Pt planning to stay in his basement and has 8-10 steps to get down there  Pt is currently ambulating using a cane, he does not have a bedside commode or walker  Pt is independent with his ADLs  Pt has both a walk-in shower and step in tub  Pt uses [address]  He self manages his meds and already filled and picked up his post-op lovenox  Pt enrolled in caresense  Pt educated on post-op pain, early mobilization (POD0), indication for/use of incentive spirometer (10x/hour while awake) and indication for/use of foot/leg pumps (18 hours/day)  Pt's questions were addressed, he denies having additional concerns at this time

## 2018-06-14 ENCOUNTER — EVALUATION (OUTPATIENT)
Dept: PHYSICAL THERAPY | Facility: REHABILITATION | Age: 63
End: 2018-06-14
Payer: COMMERCIAL

## 2018-06-14 DIAGNOSIS — M25.562 CHRONIC PAIN OF LEFT KNEE: Primary | ICD-10-CM

## 2018-06-14 DIAGNOSIS — G89.29 CHRONIC PAIN OF LEFT KNEE: Primary | ICD-10-CM

## 2018-06-14 PROCEDURE — G8990 OTHER PT/OT CURRENT STATUS: HCPCS | Performed by: PHYSICAL THERAPIST

## 2018-06-14 PROCEDURE — G8991 OTHER PT/OT GOAL STATUS: HCPCS | Performed by: PHYSICAL THERAPIST

## 2018-06-14 PROCEDURE — 97162 PT EVAL MOD COMPLEX 30 MIN: CPT | Performed by: PHYSICAL THERAPIST

## 2018-06-14 PROCEDURE — 97110 THERAPEUTIC EXERCISES: CPT | Performed by: PHYSICAL THERAPIST

## 2018-06-14 NOTE — PROGRESS NOTES
PT Evaluation     Today's date: 2018  Patient name: Bryanna Mary  : 1955  MRN: [id number]  Referring provider: Millicent Garcia MD  Dx:   Encounter Diagnosis     ICD-10-CM    1  Chronic pain of left knee M25 562     G89 29                   Assessment  Impairments: pain with function and weight-bearing intolerance    Assessment details: Patient reported functional outcome scores reviewed  Discussed DOS and patient's questions were answered to patient's satisfaction  Mobility/ROM results per above  Strength results per above  Balance/Gait (including Timed Up & Go) per above  Virtual Home Assessment was reviewed with patient  Home Preparation Checklist was reviewed with patient including identification of care partner and encouragement of single level set-up  Patient was resistant to the recommendations but pt was reminded of the importance of these procedures  Post-operative pain management and activity expectations discussed to the patient's satisfaction  Post-operative gait training for level ground and stairs reviewed Patient demonstrated competence with immediate post-operative home exercise program  Pt is a candidate for outpaitent PT following discharge from the hospital  The benefits of attending outpatient therapy discussed and pt in agreement that outpt will be the goal  Pt will benefit from continued PT following surgery to improve function and allow for the pt to return to desired level of function  Understanding of Dx/Px/POC: good   Prognosis: good  Prognosis details: Short Term  1: Patient will report a 50% decrease in pain in 2-4 weeks  2: Pt will have knee ROM 0-120 degrees in   3  Pt will have knee strength 5/5 in both directions in 6 weeks  Long Term  1: Patient will demonstrate independence in home exercise program by discharge  2: Patient will have FOTO score of (to be determined following surgery) indicating improved function in 8 weeks       Plan  Patient would benefit from: skilled physical therapy  Planned therapy interventions: neuromuscular re-education, therapeutic exercise, strengthening, stretching and manual therapy  Frequency: 2x week  Duration in weeks: 12  Treatment plan discussed with: patient        Subjective Evaluation    History of Present Illness  Mechanism of injury: Pt presents for a pre-operative visit  Pt is scheduled to receive a left TKA on 2018  Pt reports that currently he is having trouble walking and flexing his knee  States that he has had one x-ray which showed arthritis  States that he does not have pain at rest but has pain with most activity and someday's it is very painful and prevents him from sleeping  Pt reports that he has had 2 injections which did not help  Pt works as a   Pt has high expectations for his activity levels after surgery  Pt reports that he lives in a rancher house with no steps to get into the front  Pt states that there is a basement that he would like to spend time in that there are 10 stairs to get to the basement  States that he has a half bathroom in the first floor but states that it will be being remodeled during the recovery period  Pt states  he prefers the bathroom in the basement and he does not want to post-pone the remodel  Pt reports that he does not think he will be able to arrange for someone to be there during the day for the first several days but feels that this will not be neccessary     Pain  No pain reported  At best pain ratin  At worst pain ratin          Objective     Active Range of Motion   Left Knee   Flexion: 155 degrees   Extension: 0 degrees     Right Knee   Flexion: 130 degrees   Extension: 0 degrees     Strength/Myotome Testing     Left Knee   Flexion: 5  Extension: 5    Right Knee   Flexion: 5  Extension: 5    Additional Strength Details  +    General Comments     Knee Comments  TU seconds           Precautions: DM2, HTN    Daily Treatment Diary     Manual PROM                                                                     Exercise Diary              Pre-op HEP reviewed                                                                                                                                                                                                                                                                           Modalities

## 2018-06-20 ENCOUNTER — ANESTHESIA EVENT (OUTPATIENT)
Dept: PERIOP | Facility: HOSPITAL | Age: 63
DRG: 470 | End: 2018-06-20
Payer: COMMERCIAL

## 2018-06-20 ENCOUNTER — ANESTHESIA (OUTPATIENT)
Dept: PERIOP | Facility: HOSPITAL | Age: 63
DRG: 470 | End: 2018-06-20
Payer: COMMERCIAL

## 2018-06-20 ENCOUNTER — HOSPITAL ENCOUNTER (INPATIENT)
Facility: HOSPITAL | Age: 63
LOS: 4 days | Discharge: HOME | DRG: 470 | End: 2018-06-24
Attending: ORTHOPAEDIC SURGERY | Admitting: ORTHOPAEDIC SURGERY
Payer: COMMERCIAL

## 2018-06-20 DIAGNOSIS — E11.9 TYPE 2 DIABETES MELLITUS WITHOUT COMPLICATION, UNSPECIFIED WHETHER LONG TERM INSULIN USE (HCC): Primary | ICD-10-CM

## 2018-06-20 DIAGNOSIS — M17.10 ARTHRITIS OF KNEE: ICD-10-CM

## 2018-06-20 DIAGNOSIS — M17.12 PRIMARY OSTEOARTHRITIS OF LEFT KNEE: ICD-10-CM

## 2018-06-20 DIAGNOSIS — I15.9 SECONDARY HYPERTENSION: ICD-10-CM

## 2018-06-20 DIAGNOSIS — M25.562 CHRONIC PAIN OF LEFT KNEE: ICD-10-CM

## 2018-06-20 DIAGNOSIS — G89.29 CHRONIC PAIN OF LEFT KNEE: ICD-10-CM

## 2018-06-20 LAB
ABO GROUP BLD: NORMAL
BLD GP AB SCN SERPL QL: NEGATIVE
GLUCOSE SERPL-MCNC: 145 MG/DL (ref 65–140)
GLUCOSE SERPL-MCNC: 173 MG/DL (ref 65–140)
GLUCOSE SERPL-MCNC: 198 MG/DL (ref 65–140)
GLUCOSE SERPL-MCNC: 226 MG/DL (ref 65–140)
RH BLD: POSITIVE
SPECIMEN EXPIRATION DATE: NORMAL

## 2018-06-20 PROCEDURE — 86850 RBC ANTIBODY SCREEN: CPT | Performed by: ORTHOPAEDIC SURGERY

## 2018-06-20 PROCEDURE — C1776 JOINT DEVICE (IMPLANTABLE): HCPCS | Performed by: ORTHOPAEDIC SURGERY

## 2018-06-20 PROCEDURE — G8978 MOBILITY CURRENT STATUS: HCPCS

## 2018-06-20 PROCEDURE — 86901 BLOOD TYPING SEROLOGIC RH(D): CPT | Performed by: ORTHOPAEDIC SURGERY

## 2018-06-20 PROCEDURE — 86900 BLOOD TYPING SEROLOGIC ABO: CPT | Performed by: ORTHOPAEDIC SURGERY

## 2018-06-20 PROCEDURE — C1713 ANCHOR/SCREW BN/BN,TIS/BN: HCPCS | Performed by: ORTHOPAEDIC SURGERY

## 2018-06-20 PROCEDURE — 0SRD0J9 REPLACEMENT OF LEFT KNEE JOINT WITH SYNTHETIC SUBSTITUTE, CEMENTED, OPEN APPROACH: ICD-10-PCS | Performed by: ORTHOPAEDIC SURGERY

## 2018-06-20 PROCEDURE — 82948 REAGENT STRIP/BLOOD GLUCOSE: CPT

## 2018-06-20 PROCEDURE — G8979 MOBILITY GOAL STATUS: HCPCS

## 2018-06-20 PROCEDURE — 97163 PT EVAL HIGH COMPLEX 45 MIN: CPT

## 2018-06-20 PROCEDURE — 27447 TOTAL KNEE ARTHROPLASTY: CPT | Performed by: ORTHOPAEDIC SURGERY

## 2018-06-20 DEVICE — ATTUNE KNEE SYSTEM TIBIAL BASE FIXED BEARING SIZE 7 CEMENTED
Type: IMPLANTABLE DEVICE | Site: KNEE | Status: FUNCTIONAL
Brand: ATTUNE

## 2018-06-20 DEVICE — ATTUNE KNEE SYSTEM FEMORAL POSTERIOR STABILIZED SIZE 6 LEFT CEMENTED
Type: IMPLANTABLE DEVICE | Site: KNEE | Status: FUNCTIONAL
Brand: ATTUNE

## 2018-06-20 DEVICE — SMARTSET HV HIGH VISCOSITY BONE CEMENT 40G
Type: IMPLANTABLE DEVICE | Site: KNEE | Status: FUNCTIONAL
Brand: SMARTSET

## 2018-06-20 DEVICE — ATTUNE PATELLA MEDIALIZED DOME 38MM CEMENTED AOX
Type: IMPLANTABLE DEVICE | Site: KNEE | Status: FUNCTIONAL
Brand: ATTUNE

## 2018-06-20 DEVICE — ATTUNE KNEE SYSTEM TIBIAL INSERT FIXED BEARING POSTERIOR STABILIZED 6 7MM AOX
Type: IMPLANTABLE DEVICE | Site: KNEE | Status: FUNCTIONAL
Brand: ATTUNE

## 2018-06-20 RX ORDER — OLMESARTAN MEDOXOMIL 20 MG/1
40 TABLET ORAL DAILY
Status: DISCONTINUED | OUTPATIENT
Start: 2018-06-20 | End: 2018-06-20

## 2018-06-20 RX ORDER — LANOLIN ALCOHOL/MO/W.PET/CERES
400 CREAM (GRAM) TOPICAL DAILY
Status: DISCONTINUED | OUTPATIENT
Start: 2018-06-20 | End: 2018-06-24 | Stop reason: HOSPADM

## 2018-06-20 RX ORDER — LABETALOL HYDROCHLORIDE 5 MG/ML
5 INJECTION, SOLUTION INTRAVENOUS
Status: DISCONTINUED | OUTPATIENT
Start: 2018-06-20 | End: 2018-06-20 | Stop reason: HOSPADM

## 2018-06-20 RX ORDER — SODIUM CHLORIDE, SODIUM LACTATE, POTASSIUM CHLORIDE, CALCIUM CHLORIDE 600; 310; 30; 20 MG/100ML; MG/100ML; MG/100ML; MG/100ML
125 INJECTION, SOLUTION INTRAVENOUS CONTINUOUS
Status: DISCONTINUED | OUTPATIENT
Start: 2018-06-20 | End: 2018-06-20

## 2018-06-20 RX ORDER — ONDANSETRON 2 MG/ML
INJECTION INTRAMUSCULAR; INTRAVENOUS AS NEEDED
Status: DISCONTINUED | OUTPATIENT
Start: 2018-06-20 | End: 2018-06-20 | Stop reason: SURG

## 2018-06-20 RX ORDER — TRAMADOL HYDROCHLORIDE 50 MG/1
50 TABLET ORAL EVERY 6 HOURS PRN
Qty: 30 TABLET | Refills: 0 | Status: SHIPPED | OUTPATIENT
Start: 2018-06-20 | End: 2018-06-30

## 2018-06-20 RX ORDER — OXYCODONE HYDROCHLORIDE 5 MG/1
TABLET ORAL
Qty: 30 TABLET | Refills: 0 | Status: SHIPPED | OUTPATIENT
Start: 2018-06-20 | End: 2018-07-05

## 2018-06-20 RX ORDER — ASCORBIC ACID 500 MG
500 TABLET ORAL 2 TIMES DAILY
Status: DISCONTINUED | OUTPATIENT
Start: 2018-06-20 | End: 2018-06-24 | Stop reason: HOSPADM

## 2018-06-20 RX ORDER — FERROUS SULFATE 325(65) MG
325 TABLET ORAL 2 TIMES DAILY WITH MEALS
Status: DISCONTINUED | OUTPATIENT
Start: 2018-06-20 | End: 2018-06-24 | Stop reason: HOSPADM

## 2018-06-20 RX ORDER — PROPOFOL 10 MG/ML
INJECTION, EMULSION INTRAVENOUS CONTINUOUS PRN
Status: DISCONTINUED | OUTPATIENT
Start: 2018-06-20 | End: 2018-06-20 | Stop reason: SURG

## 2018-06-20 RX ORDER — OXYCODONE HYDROCHLORIDE 5 MG/1
5 TABLET ORAL EVERY 4 HOURS PRN
Status: DISCONTINUED | OUTPATIENT
Start: 2018-06-20 | End: 2018-06-24 | Stop reason: HOSPADM

## 2018-06-20 RX ORDER — DOCUSATE SODIUM 100 MG/1
100 CAPSULE, LIQUID FILLED ORAL 2 TIMES DAILY
Status: DISCONTINUED | OUTPATIENT
Start: 2018-06-20 | End: 2018-06-24 | Stop reason: HOSPADM

## 2018-06-20 RX ORDER — ASPIRIN 81 MG/1
81 TABLET ORAL EVERY OTHER DAY
Status: DISCONTINUED | OUTPATIENT
Start: 2018-06-20 | End: 2018-06-24 | Stop reason: HOSPADM

## 2018-06-20 RX ORDER — MAGNESIUM HYDROXIDE 1200 MG/15ML
LIQUID ORAL AS NEEDED
Status: DISCONTINUED | OUTPATIENT
Start: 2018-06-20 | End: 2018-06-20 | Stop reason: HOSPADM

## 2018-06-20 RX ORDER — FENTANYL CITRATE 50 UG/ML
INJECTION, SOLUTION INTRAMUSCULAR; INTRAVENOUS AS NEEDED
Status: DISCONTINUED | OUTPATIENT
Start: 2018-06-20 | End: 2018-06-20 | Stop reason: SURG

## 2018-06-20 RX ORDER — OXYCODONE HYDROCHLORIDE 10 MG/1
10 TABLET ORAL EVERY 4 HOURS PRN
Status: DISCONTINUED | OUTPATIENT
Start: 2018-06-20 | End: 2018-06-24 | Stop reason: HOSPADM

## 2018-06-20 RX ORDER — FENTANYL CITRATE/PF 50 MCG/ML
25 SYRINGE (ML) INJECTION
Status: DISCONTINUED | OUTPATIENT
Start: 2018-06-20 | End: 2018-06-20 | Stop reason: HOSPADM

## 2018-06-20 RX ORDER — CARVEDILOL 25 MG/1
25 TABLET ORAL 2 TIMES DAILY WITH MEALS
Status: DISCONTINUED | OUTPATIENT
Start: 2018-06-20 | End: 2018-06-24 | Stop reason: HOSPADM

## 2018-06-20 RX ORDER — ONDANSETRON 2 MG/ML
4 INJECTION INTRAMUSCULAR; INTRAVENOUS ONCE AS NEEDED
Status: DISCONTINUED | OUTPATIENT
Start: 2018-06-20 | End: 2018-06-20 | Stop reason: HOSPADM

## 2018-06-20 RX ORDER — FUROSEMIDE 40 MG/1
40 TABLET ORAL DAILY
Status: DISCONTINUED | OUTPATIENT
Start: 2018-06-21 | End: 2018-06-20

## 2018-06-20 RX ORDER — ALLOPURINOL 100 MG/1
100 TABLET ORAL 2 TIMES DAILY
Status: DISCONTINUED | OUTPATIENT
Start: 2018-06-20 | End: 2018-06-24 | Stop reason: HOSPADM

## 2018-06-20 RX ORDER — SENNOSIDES 8.6 MG
1 TABLET ORAL DAILY
Status: DISCONTINUED | OUTPATIENT
Start: 2018-06-20 | End: 2018-06-24 | Stop reason: HOSPADM

## 2018-06-20 RX ORDER — PRAVASTATIN SODIUM 40 MG
40 TABLET ORAL
Status: DISCONTINUED | OUTPATIENT
Start: 2018-06-20 | End: 2018-06-24 | Stop reason: HOSPADM

## 2018-06-20 RX ORDER — SODIUM CHLORIDE, SODIUM LACTATE, POTASSIUM CHLORIDE, CALCIUM CHLORIDE 600; 310; 30; 20 MG/100ML; MG/100ML; MG/100ML; MG/100ML
1.5 INJECTION, SOLUTION INTRAVENOUS CONTINUOUS
Status: DISCONTINUED | OUTPATIENT
Start: 2018-06-20 | End: 2018-06-21

## 2018-06-20 RX ORDER — BUPIVACAINE HYDROCHLORIDE 7.5 MG/ML
INJECTION, SOLUTION INTRASPINAL AS NEEDED
Status: DISCONTINUED | OUTPATIENT
Start: 2018-06-20 | End: 2018-06-20 | Stop reason: SURG

## 2018-06-20 RX ORDER — ONDANSETRON 2 MG/ML
4 INJECTION INTRAMUSCULAR; INTRAVENOUS EVERY 4 HOURS PRN
Status: DISCONTINUED | OUTPATIENT
Start: 2018-06-20 | End: 2018-06-24 | Stop reason: HOSPADM

## 2018-06-20 RX ORDER — MEPERIDINE HYDROCHLORIDE 25 MG/ML
12.5 INJECTION INTRAMUSCULAR; INTRAVENOUS; SUBCUTANEOUS AS NEEDED
Status: DISCONTINUED | OUTPATIENT
Start: 2018-06-20 | End: 2018-06-20 | Stop reason: HOSPADM

## 2018-06-20 RX ORDER — ROPIVACAINE HYDROCHLORIDE 5 MG/ML
INJECTION, SOLUTION EPIDURAL; INFILTRATION; PERINEURAL AS NEEDED
Status: DISCONTINUED | OUTPATIENT
Start: 2018-06-20 | End: 2018-06-20 | Stop reason: SURG

## 2018-06-20 RX ORDER — AMLODIPINE BESYLATE 10 MG/1
10 TABLET ORAL DAILY
Status: DISCONTINUED | OUTPATIENT
Start: 2018-06-21 | End: 2018-06-24 | Stop reason: HOSPADM

## 2018-06-20 RX ORDER — MIDAZOLAM HYDROCHLORIDE 1 MG/ML
INJECTION INTRAMUSCULAR; INTRAVENOUS
Status: DISPENSED
Start: 2018-06-20 | End: 2018-06-20

## 2018-06-20 RX ORDER — TRAMADOL HYDROCHLORIDE 50 MG/1
50 TABLET ORAL EVERY 6 HOURS SCHEDULED
Status: DISCONTINUED | OUTPATIENT
Start: 2018-06-20 | End: 2018-06-24 | Stop reason: HOSPADM

## 2018-06-20 RX ORDER — MIDAZOLAM HYDROCHLORIDE 1 MG/ML
INJECTION INTRAMUSCULAR; INTRAVENOUS AS NEEDED
Status: DISCONTINUED | OUTPATIENT
Start: 2018-06-20 | End: 2018-06-20 | Stop reason: SURG

## 2018-06-20 RX ORDER — ALBUTEROL SULFATE 2.5 MG/3ML
2.5 SOLUTION RESPIRATORY (INHALATION) ONCE AS NEEDED
Status: DISCONTINUED | OUTPATIENT
Start: 2018-06-20 | End: 2018-06-20 | Stop reason: HOSPADM

## 2018-06-20 RX ORDER — AMLODIPINE BESYLATE 10 MG/1
10 TABLET ORAL DAILY
Status: DISCONTINUED | OUTPATIENT
Start: 2018-06-20 | End: 2018-06-20

## 2018-06-20 RX ADMIN — ALLOPURINOL 100 MG: 100 TABLET ORAL at 17:18

## 2018-06-20 RX ADMIN — CARVEDILOL 25 MG: 25 TABLET, FILM COATED ORAL at 17:18

## 2018-06-20 RX ADMIN — MIDAZOLAM 2 MG: 1 INJECTION INTRAMUSCULAR; INTRAVENOUS at 09:55

## 2018-06-20 RX ADMIN — TRAMADOL HYDROCHLORIDE 50 MG: 50 TABLET, FILM COATED ORAL at 14:02

## 2018-06-20 RX ADMIN — PROPOFOL 50 MCG/KG/MIN: 10 INJECTION, EMULSION INTRAVENOUS at 10:17

## 2018-06-20 RX ADMIN — SODIUM CHLORIDE, SODIUM LACTATE, POTASSIUM CHLORIDE, AND CALCIUM CHLORIDE 1.5 ML/KG/HR: .6; .31; .03; .02 INJECTION, SOLUTION INTRAVENOUS at 12:48

## 2018-06-20 RX ADMIN — BUPIVACAINE HYDROCHLORIDE IN DEXTROSE 1.6 ML: 7.5 INJECTION, SOLUTION SUBARACHNOID at 10:15

## 2018-06-20 RX ADMIN — DOCUSATE SODIUM 100 MG: 100 CAPSULE, LIQUID FILLED ORAL at 17:19

## 2018-06-20 RX ADMIN — OXYCODONE HYDROCHLORIDE 10 MG: 10 TABLET ORAL at 19:57

## 2018-06-20 RX ADMIN — FENTANYL CITRATE 50 MCG: 50 INJECTION, SOLUTION INTRAMUSCULAR; INTRAVENOUS at 11:37

## 2018-06-20 RX ADMIN — ONDANSETRON 4 MG: 2 INJECTION INTRAMUSCULAR; INTRAVENOUS at 11:08

## 2018-06-20 RX ADMIN — PRAVASTATIN SODIUM 40 MG: 40 TABLET ORAL at 17:18

## 2018-06-20 RX ADMIN — HYDROMORPHONE HYDROCHLORIDE 1 MG: 1 INJECTION, SOLUTION INTRAMUSCULAR; INTRAVENOUS; SUBCUTANEOUS at 17:19

## 2018-06-20 RX ADMIN — METFORMIN HYDROCHLORIDE 500 MG: 500 TABLET, FILM COATED ORAL at 17:27

## 2018-06-20 RX ADMIN — Medication 2000 MG: at 10:20

## 2018-06-20 RX ADMIN — TRAMADOL HYDROCHLORIDE 50 MG: 50 TABLET, FILM COATED ORAL at 17:18

## 2018-06-20 RX ADMIN — HYDROMORPHONE HYDROCHLORIDE 1 MG: 1 INJECTION, SOLUTION INTRAMUSCULAR; INTRAVENOUS; SUBCUTANEOUS at 21:22

## 2018-06-20 RX ADMIN — OXYCODONE HYDROCHLORIDE AND ACETAMINOPHEN 500 MG: 500 TABLET ORAL at 17:18

## 2018-06-20 RX ADMIN — SODIUM CHLORIDE, SODIUM LACTATE, POTASSIUM CHLORIDE, AND CALCIUM CHLORIDE 125 ML/HR: .6; .31; .03; .02 INJECTION, SOLUTION INTRAVENOUS at 08:15

## 2018-06-20 RX ADMIN — FENTANYL CITRATE 50 MCG: 50 INJECTION, SOLUTION INTRAMUSCULAR; INTRAVENOUS at 10:08

## 2018-06-20 RX ADMIN — CEFAZOLIN SODIUM 2000 MG: 10 INJECTION, POWDER, FOR SOLUTION INTRAVENOUS at 17:19

## 2018-06-20 RX ADMIN — DEXAMETHASONE SODIUM PHOSPHATE 10 MG: 10 INJECTION INTRAMUSCULAR; INTRAVENOUS at 11:08

## 2018-06-20 RX ADMIN — ROPIVACAINE HYDROCHLORIDE 20 ML: 5 INJECTION, SOLUTION EPIDURAL; INFILTRATION; PERINEURAL at 09:06

## 2018-06-20 RX ADMIN — Medication 325 MG: at 17:19

## 2018-06-20 RX ADMIN — OXYCODONE HYDROCHLORIDE 10 MG: 10 TABLET ORAL at 15:57

## 2018-06-20 RX ADMIN — PHENYLEPHRINE HYDROCHLORIDE 30 MCG/MIN: 10 INJECTION INTRAVENOUS at 10:34

## 2018-06-20 RX ADMIN — SODIUM CHLORIDE, SODIUM LACTATE, POTASSIUM CHLORIDE, AND CALCIUM CHLORIDE 1.5 ML/KG/HR: .6; .31; .03; .02 INJECTION, SOLUTION INTRAVENOUS at 21:25

## 2018-06-20 RX ADMIN — SODIUM CHLORIDE, SODIUM LACTATE, POTASSIUM CHLORIDE, AND CALCIUM CHLORIDE: .6; .31; .03; .02 INJECTION, SOLUTION INTRAVENOUS at 11:17

## 2018-06-20 NOTE — ANESTHESIA POSTPROCEDURE EVALUATION
Post-Op Assessment Note      CV Status:  Stable    Mental Status:  Alert and awake    Hydration Status:  Euvolemic and stable    PONV Controlled:  None    Airway Patency:  Patent    Post Op Vitals Reviewed: Yes          Staff: CRNA           /73 (06/20/18 1140)    Temp (!) 96 °F (35 6 °C) (06/20/18 1140)    Pulse 58 (06/20/18 1140)   Resp 22 (06/20/18 1140)    SpO2 100 % (06/20/18 1140)

## 2018-06-20 NOTE — H&P (VIEW-ONLY)
Office Visit     4/26/2018  [address] [facility] Danie Cohen MD   Orthopedic Surgery   Arthritis of knee +2 more   Dx   Left Knee - Follow-up; Referred by Heath Crooks MD   Reason for Visit    H&P   Oly Moreira MD (Resident) [facility]   Cosigned by: Lesli Cohen MD at 4/27/2018  7:26 AM   Attestation signed by Lesli Cohen MD at 4/27/2018 7:26 AM   Patient was seen and examined  Case was reviewed with the resident physician  I agree with the history, exam, assessment and plan as documented by the resident physician  53-year-old male with arthritic left knee was failed nonsurgical management  Due to the presence of pain and dysfunction, operative treatment is indicated  After review of the risks and benefits, consent has been established for left total knee replacement  No guarantees were given         63 y o male presents for repeat evaluation of right knee osteoarthritis  Patient notes that previous corticosteroid injection failed to provide any significant long-term relief    He presents today with a large knee effusion requesting surgical intervention     Review of Systems  Review of systems negative unless otherwise specified in HPI     Past Medical History       Past Medical History:   Diagnosis Date    Diabetes insipidus ([address] )      Gout      Hypertension           Past Surgical History  Past Surgical History:   Procedure Laterality Date    GALLBLADDER SURGERY        HEMORRHOID SURGERY             Current Medications         Current Outpatient Prescriptions on File Prior to Visit   Medication Sig Dispense Refill    allopurinol (ZYLOPRIM) 100 mg tablet TK 1 T PO  BID   3    amlodipine-olmesartan (KALIN) 10-40 MG TK 1 T PO QD   5    carvedilol (COREG) 25 mg tablet TK 1 T PO BID WITH FOOD   6    furosemide (LASIX) 20 mg tablet TK 1 T PO  D   5    metFORMIN (GLUCOPHAGE) 500 mg tablet TK 1 T PO D   5    naproxen (NAPROSYN) 500 mg tablet TK 1 T PO D AFTER FOOD   1    simvastatin (ZOCOR) 20 mg tablet TK 1 T PO QD IN THE EVENING AFTER SUPPER   2      No current facility-administered medications on file prior to visit           Recent Labs (HCT,HGB,PT,INR,ESR,CRP,GLU,HgA1C)  No results found for: HCT, HGB, WBC, PT, INR, ESR, CRP, GLUCOSE, HGBA1C        Physical exam  · General: Awake, Alert, Oriented  · Eyes: Pupils equal, round and reactive to light  · Heart: regular rate and rhythm  · Lungs: No audible wheezing  · Abdomen: soft  left Knee exam  · Tender to palpation over medial lateral joint line  · Extension 0° flexion 150°  · Stable to varus valgus stress negative lock negative posterior drawer  · Large effusion appreciated  · Sensation grossly intact the entirety of extremity     Procedure  Large joint arthrocentesis  Date/Time: 4/26/2018 3:14 PM  Consent given by: patient  Supporting Documentation  Indications: pain   Procedure Details  Location: knee - L knee ([address]ursa)  Needle size: 16 G  Medications administered: 2 mL bupivacaine 0 25 %     Aspirate amount: 90 mL                 Imaging  No new imaging     Assessment:   62 y o male with severe left knee osteoarthritis refractory to non operative treatment     Plan  · Weightbearing:   As tolerated  · Activity:  As tolerated  · Patient will be scheduled for left total knee arthroplasty  · Follow-up: 1 week postop               Progress Notes   Lidna Copeland MD (Resident) [facility]opedic Surgery   Cosigned by: Tran Barr MD at 4/26/2018  4:55 PM   Procedure Orders:   1  Large joint arthrocentesis [id number] ordered by Linda Copeland MD at 04/26/18 1514   Post-procedure Diagnoses:   1  Arthritis of knee [M17 10]   Attestation signed by Tran Barr MD at 4/26/2018 4:55 PM   Patient was seen and examined today  Case was reviewed with the resident physician  I agree the history, exam, assessment and plan is documented by the resident physician    Patient is 60-year-old black male was osteoarthritis affecting his left knee  He describes persistence of pain in the left knee  The pain is located level left knee joint, the pain is made worse bearing weight, the pain increases with increased activities  Of note he describes moderate swelling in his left knee today  Exam confirms antalgic gait pattern  Breathing is nonlabored  Left hip moves well  Left thighs devoid of atrophy  Left knee in varus  There is lot modest effusion  There is no warmth  He has good extension can flex well  There is no mid flexion valgus instability  There is no tenderness in the left calf  Assessment/plan:  27-year-old black male osteoarthritis left knee that remained symptomatic despite appropriate nonsurgical measures  I discussed the role of elective left total knee replacement in acute stab secure 1st current symptoms  After review of the risks and benefits, consent was that for elective left total knee replacement  No guarantees given  To allow for appropriate pain reduction and improved performance prior to the surgery, aspiration injection is offered  It is advised, except, administers outlined above  I would welcome the opportunity see      Expand All Collapse All    62 y o male presents for repeat evaluation of right knee osteoarthritis  Patient notes that previous corticosteroid injection failed to provide any significant long-term relief    He presents today with a large knee effusion requesting surgical intervention     Review of Systems  Review of systems negative unless otherwise specified in HPI     Past Medical History  Medical History        Past Medical History:   Diagnosis Date    Diabetes insipidus ([address] )      Gout      Hypertension              Past Surgical History  Surgical History         Past Surgical History:   Procedure Laterality Date    GALLBLADDER SURGERY        HEMORRHOID SURGERY                Current Medications         Current Outpatient Prescriptions on File Prior to Visit   Medication Sig Dispense Refill    allopurinol (ZYLOPRIM) 100 mg tablet TK 1 T PO  BID   3    amlodipine-olmesartan (KALIN) 10-40 MG TK 1 T PO QD   5    carvedilol (COREG) 25 mg tablet TK 1 T PO BID WITH FOOD   6    furosemide (LASIX) 20 mg tablet TK 1 T PO  D   5    metFORMIN (GLUCOPHAGE) 500 mg tablet TK 1 T PO D   5    naproxen (NAPROSYN) 500 mg tablet TK 1 T PO  D AFTER FOOD   1    simvastatin (ZOCOR) 20 mg tablet TK 1 T PO QD IN THE EVENING AFTER SUPPER   2      No current facility-administered medications on file prior to visit           Recent Labs (HCT,HGB,PT,INR,ESR,CRP,GLU,HgA1C)  No results found for: HCT, HGB, WBC, PT, INR, ESR, CRP, GLUCOSE, HGBA1C        Physical exam  · General: Awake, Alert, Oriented  · Eyes: Pupils equal, round and reactive to light  · Heart: regular rate and rhythm  · Lungs: No audible wheezing  · Abdomen: soft  left Knee exam  · Tender to palpation over medial lateral joint line  · Extension 0° flexion 150°  · Stable to varus valgus stress negative lock negative posterior drawer  · Large effusion appreciated  · Sensation grossly intact the entirety of extremity     Procedure  Large joint arthrocentesis  Date/Time: 4/26/2018 3:14 PM  Consent given by: patient  Supporting Documentation  Indications: pain   Procedure Details  Location: knee - L knee (Pes Bursa)  Needle size: 16 G  Medications administered: 2 mL bupivacaine 0 25 %     Aspirate amount: 90 mL                 Imaging  No new imaging     Assessment:   62 y o male with severe left knee osteoarthritis refractory to non operative treatment     Plan  · Weightbearing:   As tolerated  · Activity:  As tolerated  · Patient will be scheduled for left total knee arthroplasty  · Follow-up: 1 week postop            Instructions      After Visit Summary (Printed 4/26/2018)   Additional Documentation     Vitals:    /86    Pulse 93    Ht 5' 8" (1 727 m)    Wt 93 1 kg (205 lb 3 2 oz)    BMI 31 20 kg/m²    BSA 2 07 m²    Flowsheets:   Custom Formula Data       SmartForms:    [facility] PRE-CHARTING       Encounter Info:    Billing Info,    History,    Allergies,    Detailed Report       Orders Placed         Basic metabolic panel       APTT       CBC and differential       Protime-INR       Large joint arthrocentesis      Case request operating room: ARTHROPLASTY KNEE TOTAL Once      All Encounter Results   Medication Changes         Ascorbic Acid 500 mg Oral Daily       Bupivacaine HCl 0 25 % 2 mL Injection One-time injection       Enoxaparin Sodium 40 mg Subcutaneous Daily (early morning), To be started PostOp       Ferrous Sulfate 324 mg Oral 2 times daily before meals       Folic Acid 317 mcg Oral Daily      Medication List   Medications Administered      Bupivacaine HCl 2 mL   Visit Diagnoses         Arthritis of knee      Primary osteoarthritis of left knee      Effusion of left knee      Problem List

## 2018-06-20 NOTE — ANESTHESIA PROCEDURE NOTES
Peripheral Block    Patient location during procedure: holding area  Start time: 6/20/2018 9:00 AM  Reason for block: at surgeon's request and post-op pain management  Staffing  Anesthesiologist: Gentry Overall  Performed: anesthesiologist   Preanesthetic Checklist  Completed: patient identified, site marked, surgical consent, pre-op evaluation, timeout performed, IV checked, risks and benefits discussed and monitors and equipment checked  Peripheral Block  Patient position: supine  Prep: ChloraPrep  Patient monitoring: continuous pulse ox, frequent blood pressure checks, cardiac monitor and heart rate  Block type: adductor canal block  Laterality: left  Injection technique: single-shot  Procedures: ultrasound guided  Ultrasound permanent image saved  Needle  Needle type: Stimuplex   Needle gauge: 22 G  Needle length: 10 cm  Needle localization: ultrasound guidance  Needle insertion depth: 5 cm  Catheter size: 18 G  Assessment  Injection assessment: incremental injection, local visualized surrounding nerve on ultrasound, negative aspiration for heme and no paresthesia on injection  Paresthesia pain: none  Heart rate change: no  Slow fractionated injection: yes  Post-procedure:  site cleaned  patient tolerated the procedure well with no immediate complications

## 2018-06-20 NOTE — CONSULTS
Progress Note - Anesthesia Acute Pain Management    Mardy Galeazzi 63 y o  male MRN: [id number]  Unit/Bed#: CW3 346-01 Encounter: [id number]      Assessment:   Principal Problem:    Arthritis of knee      Pain History  Current pain location(s): left knee  Pain Scale:   4-6/10  Current Analgesic regimen:  Tramadol 50mg q6h, oxycodone 5-10mg q4h PRN, hydromorphone IV 1mg q4h PRN  24 hour history: Patient had left TKA this morning with a left adductor canal block and spinal anesthesia  Patient states that the nerve block is starting to wear off and he just took pain medication and will be working with PT this afternoon  Meds/Allergies     Allergies   Allergen Reactions    Acetaminophen Hives       Objective     Temp:  [96 °F (35 6 °C)-98 7 °F (37 1 °C)] 97 4 °F (36 3 °C)  HR:  [54-88] 66  Resp:  [12-22] 16  BP: (109-153)/(67-82) 125/77      Intake/Output Summary (Last 24 hours) at 06/20/18 1522  Last data filed at 06/20/18 1444   Gross per 24 hour   Intake             1725 ml   Output              685 ml   Net             1040 ml                 Physical Exam: /77 (BP Location: Right arm)   Pulse 66   Temp (!) 97 4 °F (36 3 °C) (Oral)   Resp 16   Ht 5' 8" (1 727 m)   Wt 89 7 kg (197 lb 12 oz)   SpO2 97%   BMI 30 07 kg/m²   Gen: Well appearing and well nourished, NAD  CV: RRR, +s1/s2, no M/R/G  Pul: Lungs CTAB  Abd: Soft, non-tender, non-distended  Ext:  No cyanosis or edema, LLE wrapped in ACE bandage    Lab Results:  Lab Results   Component Value Date    WBC 5 52 05/17/2018    HGB 12 8 05/17/2018    HCT 36 9 05/17/2018    MCV 93 05/17/2018     05/17/2018     Lab Results   Component Value Date    CALCIUM 9 2 05/17/2018     05/17/2018    K 3 7 05/17/2018    CO2 28 05/17/2018     05/17/2018    BUN 17 05/17/2018    CREATININE 0 92 05/17/2018     Plan:   Patient has well controlled postop pain with current regimen   His adductor canal block is beginning to wear off, which I explained is appropriate given that the block was placed >6 hours ago  I stated that he should continue to have relief from the block for the remainder of the evening but overnight the block will completely wear off  I explained that he should request pain medication as pain starts to increase in order to compensate as well as before he performs activities likely to exacerbate pain such as physical therapy, ambulation, moving out of bed, etc  APS will reevaluate the patient tomorrow after his block has completely worn off to see how he is tolerating pain and to assess resolution of nerve blockade        SIGNATURE: Deo Wong MD  DATE: June 20, 2018  TIME: 3:22 PM

## 2018-06-20 NOTE — INTERVAL H&P NOTE
H&P reviewed  After examining the patient I find no changes in the patients condition since the H&P had been written      Preop for left total knee replacement

## 2018-06-20 NOTE — CONSULTS
Office Visit     4/26/2018  [address] [facility] Danie Mix MD   Orthopedic Surgery   Arthritis of knee +2 more   Dx   Left Knee - Follow-up; Referred by Birgit Johnson MD   Reason for Visit    H&P   Spencer Mercedes MD (Resident) [facility]   Cosigned by: Tab Mix MD at 4/27/2018  7:26 AM   Attestation signed by Tab Mix MD at 4/27/2018 7:26 AM   Patient was seen and examined  Case was reviewed with the resident physician  I agree with the history, exam, assessment and plan as documented by the resident physician  22-year-old male with arthritic left knee was failed nonsurgical management  Due to the presence of pain and dysfunction, operative treatment is indicated  After review of the risks and benefits, consent has been established for left total knee replacement  No guarantees were given         63 y o male presents for repeat evaluation of right knee osteoarthritis  Patient notes that previous corticosteroid injection failed to provide any significant long-term relief    He presents today with a large knee effusion requesting surgical intervention     Review of Systems  Review of systems negative unless otherwise specified in HPI     Past Medical History       Past Medical History:   Diagnosis Date    Diabetes insipidus ([facility] [address] )      Gout      Hypertension           Past Surgical History  Past Surgical History:   Procedure Laterality Date    GALLBLADDER SURGERY        HEMORRHOID SURGERY             Current Medications         Current Outpatient Prescriptions on File Prior to Visit   Medication Sig Dispense Refill    allopurinol (ZYLOPRIM) 100 mg tablet TK 1 T PO  BID   3    amlodipine-olmesartan (KALIN) 10-40 MG TK 1 T PO QD   5    carvedilol (COREG) 25 mg tablet TK 1 T PO BID WITH FOOD   6    furosemide (LASIX) 20 mg tablet TK 1 T PO  D   5    metFORMIN (GLUCOPHAGE) 500 mg tablet TK 1 T PO D   5    naproxen (NAPROSYN) 500 mg tablet TK 1 T PO D AFTER FOOD   1    simvastatin (ZOCOR) 20 mg tablet TK 1 T PO QD IN THE EVENING AFTER SUPPER   2      No current facility-administered medications on file prior to visit           Recent Labs (HCT,HGB,PT,INR,ESR,CRP,GLU,HgA1C)  No results found for: HCT, HGB, WBC, PT, INR, ESR, CRP, GLUCOSE, HGBA1C        Physical exam  · General: Awake, Alert, Oriented  · Eyes: Pupils equal, round and reactive to light  · Heart: regular rate and rhythm  · Lungs: No audible wheezing  · Abdomen: soft  left Knee exam  · Tender to palpation over medial lateral joint line  · Extension 0° flexion 150°  · Stable to varus valgus stress negative lock negative posterior drawer  · Large effusion appreciated  · Sensation grossly intact the entirety of extremity     Procedure  Large joint arthrocentesis  Date/Time: 4/26/2018 3:14 PM  Consent given by: patient  Supporting Documentation  Indications: pain   Procedure Details  Location: knee - L knee (Pes Bursa)  Needle size: 16 G  Medications administered: 2 mL bupivacaine 0 25 %     Aspirate amount: 90 mL                 Imaging  No new imaging     Assessment:   62 y o male with severe left knee osteoarthritis refractory to non operative treatment     Plan  · Weightbearing:   As tolerated  · Activity:  As tolerated  · Patient will be scheduled for left total knee arthroplasty  · Follow-up: 1 week postop               Progress Notes   Sarah Elliott MD (Resident) [facility] Orthopedic Surgery   Cosigned by: Darwin Negrete MD at 4/26/2018  4:55 PM   Procedure Orders:   1  Large joint arthrocentesis [id number] ordered by Sarah Elliott MD at 04/26/18 1514   Post-procedure Diagnoses:   1  Arthritis of knee [M17 10]   Attestation signed by Darwin Negrete MD at 4/26/2018 4:55 PM   Patient was seen and examined today  Case was reviewed with the resident physician  I agree the history, exam, assessment and plan is documented by the resident physician    Patient is 63-year-old black male was osteoarthritis affecting his left knee  He describes persistence of pain in the left knee  The pain is located level left knee joint, the pain is made worse bearing weight, the pain increases with increased activities  Of note he describes moderate swelling in his left knee today  Exam confirms antalgic gait pattern  Breathing is nonlabored  Left hip moves well  Left thighs devoid of atrophy  Left knee in varus  There is lot modest effusion  There is no warmth  He has good extension can flex well  There is no mid flexion valgus instability  There is no tenderness in the left calf  Assessment/plan:  53-year-old black male osteoarthritis left knee that remained symptomatic despite appropriate nonsurgical measures  I discussed the role of elective left total knee replacement in acute stab secure 1st current symptoms  After review of the risks and benefits, consent was that for elective left total knee replacement  No guarantees given  To allow for appropriate pain reduction and improved performance prior to the surgery, aspiration injection is offered  It is advised, except, administers outlined above  I would welcome the opportunity see      Expand All Collapse All    62 y o male presents for repeat evaluation of right knee osteoarthritis  Patient notes that previous corticosteroid injection failed to provide any significant long-term relief    He presents today with a large knee effusion requesting surgical intervention     Review of Systems  Review of systems negative unless otherwise specified in HPI     Past Medical History  Medical History        Past Medical History:   Diagnosis Date    Diabetes insipidus ([address] )      Gout      Hypertension              Past Surgical History  Surgical History         Past Surgical History:   Procedure Laterality Date    GALLBLADDER SURGERY        HEMORRHOID SURGERY                Current Medications         Current Outpatient Prescriptions on File Prior to Visit   Medication Sig Dispense Refill    allopurinol (ZYLOPRIM) 100 mg tablet TK 1 T PO  BID   3    amlodipine-olmesartan (KALIN) 10-40 MG TK 1 T PO QD   5    carvedilol (COREG) 25 mg tablet TK 1 T PO BID WITH FOOD   6    furosemide (LASIX) 20 mg tablet TK 1 T PO  D   5    metFORMIN (GLUCOPHAGE) 500 mg tablet TK 1 T PO D   5    naproxen (NAPROSYN) 500 mg tablet TK 1 T PO  D AFTER FOOD   1    simvastatin (ZOCOR) 20 mg tablet TK 1 T PO QD IN THE EVENING AFTER SUPPER   2      No current facility-administered medications on file prior to visit           Recent Labs (HCT,HGB,PT,INR,ESR,CRP,GLU,HgA1C)  No results found for: HCT, HGB, WBC, PT, INR, ESR, CRP, GLUCOSE, HGBA1C        Physical exam  · General: Awake, Alert, Oriented  · Eyes: Pupils equal, round and reactive to light  · Heart: regular rate and rhythm  · Lungs: No audible wheezing  · Abdomen: soft  left Knee exam  · Tender to palpation over medial lateral joint line  · Extension 0° flexion 150°  · Stable to varus valgus stress negative lock negative posterior drawer  · Large effusion appreciated  · Sensation grossly intact the entirety of extremity     Procedure  Large joint arthrocentesis  Date/Time: 4/26/2018 3:14 PM  Consent given by: patient  Supporting Documentation  Indications: pain   Procedure Details  Location: knee - L knee (Pes Bursa)  Needle size: 16 G  Medications administered: 2 mL bupivacaine 0 25 %     Aspirate amount: 90 mL                 Imaging  No new imaging     Assessment:   62 y o male with severe left knee osteoarthritis refractory to non operative treatment     Plan  · Weightbearing:   As tolerated  · Activity:  As tolerated  · Patient will be scheduled for left total knee arthroplasty  · Follow-up: 1 week postop            Instructions      After Visit Summary (Printed 4/26/2018)   Additional Documentation     Vitals:    /86    Pulse 93    Ht 5' 8" (1 727 m)    Wt 93 1 kg (205 lb 3 2 oz)    BMI 31 20 kg/m²    BSA 2 07 m²    Flowsheets:   Custom Formula Data       SmartForms:    [facility] PRE-CHARTING       Encounter Info:    Billing Info,    History,    Allergies,    Detailed Report       Orders Placed         Basic metabolic panel       APTT       CBC and differential       Protime-INR       Large joint arthrocentesis      Case request operating room: ARTHROPLASTY KNEE TOTAL Once      All Encounter Results   Medication Changes         Ascorbic Acid 500 mg Oral Daily       Bupivacaine HCl 0 25 % 2 mL Injection One-time injection       Enoxaparin Sodium 40 mg Subcutaneous Daily (early morning), To be started PostOp       Ferrous Sulfate 324 mg Oral 2 times daily before meals       Folic Acid 759 mcg Oral Daily      Medication List   Medications Administered      Bupivacaine HCl 2 mL   Visit Diagnoses         Arthritis of knee      Primary osteoarthritis of left knee      Effusion of left knee      Problem List

## 2018-06-20 NOTE — PHYSICAL THERAPY NOTE
Physical Therapy Evaluation     Patient's Name: Mabel Cobos    Admitting Diagnosis  Arthritis of knee [M17 10]    Problem List  Patient Active Problem List   Diagnosis    Left knee pain    Primary osteoarthritis of left knee    Effusion of left knee    Arthritis of knee       Past Medical History  Past Medical History:   Diagnosis Date    Diabetes mellitus ([address] )     Gout     Hyperlipidemia     Hypertension        Past Surgical History  Past Surgical History:   Procedure Laterality Date    COLONOSCOPY      GALLBLADDER SURGERY      HEMORRHOID SURGERY          06/20/18 1505   Note Type   Note type Eval only   Pain Assessment   Pain Assessment 0-10   Pain Score 4   Pain Location Incision;Knee   Pain Orientation Left   Pain Descriptors Aching;Discomfort   Pain Frequency Intermittent   Pain Onset Ongoing   Clinical Progression Gradually worsening   Effect of Pain on Daily Activities guarding w/ mobility   Patient's Stated Pain Goal No pain   Hospital Pain Intervention(s) Medication (See MAR); Repositioned;Cold applied; Ambulation/increased activity; Distraction; Emotional support   Response to Interventions reports 7-8/10 post [facility]  (4 ELKE w/ hand rail or no ELKE;)   Home Layout One level;Stairs to enter with rails  (basement (12 steps w/ hand rail)   Home Equipment Cane   Prior Function   Level of [place] Independent with ADLs and functional mobility  (amb w/ occasional use of SPC)   Lives With [facility] [employer]  (work)   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)   Restrictions/Precautions   LLE [place] Bearing Per Order WBAT   Braces or Orthoses (denies)   Other Precautions Multiple lines;Telemetry; Fall Risk;Pain  (chair alarm placed and activated at the end of session)   General   Additional Pertinent History cleared for assessment by nsg   Family/Caregiver Present Yes  (spouse)   Cognition   Overall Cognitive Status [facility]   Arousal/Participation Alert   Orientation Level Oriented to person;Oriented to place;Oriented to situation   Memory Within functional limits   Following Commands Follows one step commands without difficulty   Comments Pt is in bed; appears to be apprehensive about mobilization; agreeable to try; appears to be comfortable at rest   RUE Assessment   RUE Assessment WFL  (AROM)   LUE Assessment   LUE Assessment WFL  (AROM)   RLE Assessment   RLE Assessment WFL  (AROM)   Strength RLE   RLE Overall Strength 4-/5   LLE Assessment   LLE Assessment X  (decreased AROM hip and knee)   Strength LLE   LLE Overall Strength (poor - hip; fair - knee; fair ankle (grossly))   Coordination   Sensation (Reports numbness (L) LE/buttock)   Bed Mobility   Supine to Sit 3  Moderate assistance   Additional items Assist x 1;HOB elevated; Increased time required;Verbal cues;LE management   Transfers   Sit to Stand 3  Moderate assistance   Additional items Assist x 1;Verbal cues  (stand by (A) of 2nd; reviewed the technique)   Stand to Sit 3  Moderate assistance   Additional items Assist x 1;Verbal cues; Increased time required  (stand by (A) of 2nd; reviewed the technique incl (L) LE mgmt)   Additional Comments Pt was reclined in the chair w/ LE elevated at the end of session; pillows placed for back and (L) UE support; foot pumps back on and activated; call bell placed w/in reach; ice to (L) knee; chair alarm on   Ambulation/Elevation   Gait pattern Excessively slow; Short stride; Inconsistent penelope;Decreased L stance   Gait Assistance 3  Moderate assist   Additional items Assist x 1;Verbal cues; Tactile cues   Assistive Device Rolling walker   Distance 5 ft total distance; further amb was not performed due to pt's general apprehension and decreased endurance    Balance   Static Sitting Fair -   Static Standing Poor +   Ambulatory Poor   Activity Tolerance   Activity Tolerance Patient limited by fatigue;Patient limited by pain   Nurse Made Aware spoke to [facility] Assessment   Prognosis Good   Problem List Decreased strength;Decreased range of motion;Decreased endurance; Impaired balance;Decreased mobility;Pain   Assessment Pt is 63 y o  male admitted with Dx of Arthritis of knee and underwent (L) TKA on 6/20/2018  Pt 's comorbidities affecting POC include: diabetes insipidus, gout, and HTN and personal factors of: ELKE  Pt's clinical presentation is currently unstable/unpredictable which is evident in ongoing pulse oximetry, ongoing numbness of (L) LE/buttock area, postop pain and guarding, and  need for mod assist w/ all phases of observed mobility when usually mobilizing independently  Pt presents w/ postop pain and guarding, min generalized weakness, decreased (L) LE AROM and strength, decreased functional endurance and activity tolerance, impaired balance, gait deviations, altered sensation and fall risk  Will cont to follow pt in PT for progressive mobilization to address above functional deficits and to max level of (I), endurance, and safety  Otherwise, anticipate pt will return home w/ family support upon D/C provided he cont improving w/ mobility skills, endurance and safety (incl on the steps) and appropriate level of support is available at home; PT follow up is recommended upon D/C (home PT vs OP PT depending on progress)  Goals   Patient Goals To get 150 degree of knee flexion    To be able to go down to the basement (his activity area)   STG Expiration Date 06/30/18   Short Term Goal #1 7-10 days  Pt will amb 150 ft w/ rw, mod (I) in order to facilitate safe return to premorbid environment and community amb status  Pt will negotiate 4 steps --> 12 steps w/ hand rail and SPC, mod (I) in order to navigate in and out of the house and between levels of home environment safety  Pt will achieve (I) level w/ bed mob in order to facilitate safety with OOB and back to bed transitions in own living environment   Pt will perform transfers w/ mod (I) to assure (I) and safety w/ functional mobility/transitions w/ all aspects of mobility/locomotion  Pt will participate in LE therex and balance activities to max progression w/ mobility skills  Plan   Treatment/Interventions Functional transfer training;LE strengthening/ROM; Elevations; Therapeutic exercise; Endurance training;Bed mobility;Gait training;Spoke to nursing;Family   PT Frequency Twice a day;Weekend   Recommendation   Recommendation Home with family support  (home PT vs OP PT depending on progress )   Equipment Recommended Walker   Modified Apex Scale   Modified Apex Scale 4   Barthel Index   Feeding 10   Bathing 0   Grooming Score 5   Dressing Score 5   Bladder Score 0   Bowels Score 10   Toilet Use Score 5   Transfers (Bed/Chair) Score 5   Mobility (Level Surface) Score 0   Stairs Score 0   Barthel Index Score 40         True Burkett, PT

## 2018-06-20 NOTE — PLAN OF CARE
Problem: PHYSICAL THERAPY ADULT  Goal: Performs mobility at highest level of function for planned discharge setting  See evaluation for individualized goals  Treatment/Interventions: Functional transfer training, LE strengthening/ROM, Elevations, Therapeutic exercise, Endurance training, Bed mobility, Gait training, Spoke to nursing, Family  Equipment Recommended: Jacqueline Evans       See flowsheet documentation for full assessment, interventions and recommendations  Prognosis: Good  Problem List: Decreased strength, Decreased range of motion, Decreased endurance, Impaired balance, Decreased mobility, Pain  Assessment: Pt is 63 y o  male admitted with Dx of Arthritis of knee and underwent (L) TKA on 6/20/2018  Pt 's comorbidities affecting POC include: diabetes insipidus, gout, and HTN and personal factors of: ELKE  Pt's clinical presentation is currently unstable/unpredictable which is evident in ongoing pulse oximetry, ongoing numbness of (L) LE/buttock area, postop pain and guarding, and  need for mod assist w/ all phases of observed mobility when usually mobilizing independently  Pt presents w/ postop pain and guarding, min generalized weakness, decreased (L) LE AROM and strength, decreased functional endurance and activity tolerance, impaired balance, gait deviations, altered sensation and fall risk  Will cont to follow pt in PT for progressive mobilization to address above functional deficits and to max level of (I), endurance, and safety  Otherwise, anticipate pt will return home w/ family support upon D/C provided he cont improving w/ mobility skills, endurance and safety (incl on the steps) and appropriate level of support is available at home; PT follow up is recommended upon D/C (home PT vs OP PT depending on progress)  Recommendation: Home with family support (home PT vs OP PT depending on progress )          See flowsheet documentation for full assessment

## 2018-06-20 NOTE — ANESTHESIA PROCEDURE NOTES
Spinal Block    Patient location during procedure: OR  Start time: 6/20/2018 10:05 AM  Reason for block: at surgeon's request and primary anesthetic  Staffing  Anesthesiologist: Marlee Mantilla  Performed: anesthesiologist   Preanesthetic Checklist  Completed: patient identified, site marked, surgical consent, pre-op evaluation, timeout performed, IV checked, risks and benefits discussed and monitors and equipment checked  Spinal Block  Patient position: sitting  Prep: ChloraPrep  Patient monitoring: cardiac monitor and frequent blood pressure checks  Approach: midline  Location: L3-4  Injection technique: single-shot  Needle  Needle type: pencil-tip   Needle gauge: 25 G  Needle length: 10 cm  Assessment  Sensory level: T4  Injection Assessment:  negative aspiration for heme, no paresthesia on injection and positive aspiration for clear CSF  Post-procedure:  site cleaned  Additional Notes  Multiple attempts at 2 levels, including paramedian, before spinal successfully placed

## 2018-06-20 NOTE — ANESTHESIA PREPROCEDURE EVALUATION
Review of Systems/Medical History  Patient summary reviewed        Cardiovascular  Negative cardio ROS Exercise tolerance (METS): >4,  Hyperlipidemia, Hypertension ,    Pulmonary  Negative pulmonary ROS        GI/Hepatic  Negative GI/hepatic ROS          Negative  ROS        Endo/Other  Negative endo/other ROS Diabetes well controlled ,      GYN  Negative gynecology ROS          Hematology  Negative hematology ROS      Musculoskeletal  Negative musculoskeletal ROS   Arthritis     Neurology  Negative neurology ROS      Psychology   Negative psychology ROS              Physical Exam    Airway    Mallampati score: II  TM Distance: >3 FB  Neck ROM: full     Dental   No notable dental hx     Cardiovascular  Comment: Negative ROS,     Pulmonary      Other Findings        Anesthesia Plan  ASA Score- 2     Anesthesia Type- regional and spinal with ASA Monitors  Additional Monitors:   Airway Plan:     Comment: Patient seen and examined  History reviewed  Adductor canal or femoral nerve block to be done pre-op for post-op pain  Spinal to be done in OR with sedation during the case  Plan discussed with the patient  Risks explained, consent obtained        Plan Factors-    Induction-     Postoperative Plan-     Informed Consent- Anesthetic plan and risks discussed with patient  I personally reviewed this patient with the CRNA  Discussed and agreed on the Anesthesia Plan with the CRNA  Catalina Altamirano

## 2018-06-20 NOTE — OP NOTE
OPERATIVE REPORT  PATIENT NAME: Orquidea Arce    :  1955  MRN: [id number]  Pt Location: BE OR ROOM 04    SURGERY DATE: 2018    Surgeon(s) and Role:     * Shereen Timmons MD - Primary     * Alem Stark - Assisting     * Jeremy Walden PA-C - Assisting    Preop Diagnosis:  Arthritis of knee [M17 10]  Left knee DJD   Post-Op Diagnosis Codes:     * Arthritis of knee [M17 10]  Left knee DJD  Procedure(s) (LRB):  KNEE : COMPLETE REPLACEMENT (Left)  Total knee arthroplasty left knee  Specimen(s):  * No specimens in log *    Estimated Blood Loss:   100 mL    Drains:  Closed/Suction Drain Left Knee Accordion 10 Fr  (Active)   Number of days: 0       Urethral Catheter Latex 16 Fr  (Active)   Number of days: 0       Anesthesia Type:   Choice    Operative Indications:  Arthritis of knee [M17 10]  Left knee DJD    Operative Findings:  depuy attune   Femur-6   Poly-7   Tibia-7   [id number]    Complications:   None    Procedure and Technique: Following induction of adequate level of spinal anesthesia, Lara catheter sterilely introduced into this patient's bladder  Antibiotics were administered  The left thigh was then fitted with a thigh-high tourniquet  The left lower extremity was then prepped and draped sterilely  The left lower extremity was exsanguinated gravity, the tourniquet inflated to 300 mm of mercury  A midline knee incision was created the knee in flexion  Full-thickness flaps raised get to the extensor mechanism  A medial arthrotomy was created open knee joint  Bony and soft tissue releases were performed where necessary  The distal femoral cut was made 6° valgus  This is made of a long intramedullary gladis  The proximal tibia cut was then next  As this was a varus knee, 2 mm reference medially  Care was taken in order to protect the integrity medial collateral, lateral collateral, and posterior structures during these maneuvers    The distal femoral sizing guide was used, the appropriate form 1 cutting blocks impacted  The anterior, posterior, chamfer cuts then made  The box cut was made for the posterior stabilized unit  With the trial components in, the knee was taken through range of motion, and found to be capable of full extension, good flexion, no mid flexion valgus instability  The patella was then resurfaced will utilize the manufacture's equipment, was found to be a size 38 mm button  The trial components removed, and the knee was prepared for insertion of cemented components  The cemented tibia, cemented femur, trial poly, cemented patella placed  Excess cement was removed, the knee was brought into extension  The cement was allowed to cure  The trial poly was taken out, the knee was packed off  The tourniquet deflated, hemostasis was secured  The insert polyethylene was then snapped into position  The knee was taken through a final range of motion, and found to be capable of full extension, good flexio, no mid flexion valgus instability  The patella tracked well during these maneuvers  Satisfied with the extent of surgery, the wounds then flushed with saline closed  A Betadine soak was initiated  A drain was placed deep brought out via separate lateral stab incision  The arthrotomy was closed number Vicryl suture  The subcu tissue closed 2 Vicryl suture  The skin was closed staples  A sterile dressings applied  The drains constituted    He was then transferred to recovery room stable condition with plans to include physical therapy weight-bearing to tolerance, he will require DVT prophylaxis with Lovenox   I was present for the entire procedure    Patient Disposition:  PACU     SIGNATURE: Abiel Espinal MD  DATE: June 20, 2018  TIME: 11:31 AM

## 2018-06-21 LAB
ANION GAP SERPL CALCULATED.3IONS-SCNC: 8 MMOL/L (ref 4–13)
BASOPHILS # BLD AUTO: 0.01 THOUSANDS/ΜL (ref 0–0.1)
BASOPHILS NFR BLD AUTO: 0 % (ref 0–1)
BUN SERPL-MCNC: 15 MG/DL (ref 5–25)
CALCIUM SERPL-MCNC: 8.7 MG/DL (ref 8.3–10.1)
CHLORIDE SERPL-SCNC: 102 MMOL/L (ref 100–108)
CO2 SERPL-SCNC: 27 MMOL/L (ref 21–32)
CREAT SERPL-MCNC: 1.07 MG/DL (ref 0.6–1.3)
EOSINOPHIL # BLD AUTO: 0 THOUSAND/ΜL (ref 0–0.61)
EOSINOPHIL NFR BLD AUTO: 0 % (ref 0–6)
ERYTHROCYTE [DISTWIDTH] IN BLOOD BY AUTOMATED COUNT: 11.9 % (ref 11.6–15.1)
GFR SERPL CREATININE-BSD FRML MDRD: 87 ML/MIN/1.73SQ M
GLUCOSE SERPL-MCNC: 176 MG/DL (ref 65–140)
GLUCOSE SERPL-MCNC: 184 MG/DL (ref 65–140)
GLUCOSE SERPL-MCNC: 192 MG/DL (ref 65–140)
GLUCOSE SERPL-MCNC: 195 MG/DL (ref 65–140)
GLUCOSE SERPL-MCNC: 215 MG/DL (ref 65–140)
GLUCOSE SERPL-MCNC: 216 MG/DL (ref 65–140)
GLUCOSE SERPL-MCNC: 223 MG/DL (ref 65–140)
HCT VFR BLD AUTO: 31.3 % (ref 36.5–49.3)
HGB BLD-MCNC: 10.3 G/DL (ref 12–17)
IMM GRANULOCYTES # BLD AUTO: 0.07 THOUSAND/UL (ref 0–0.2)
IMM GRANULOCYTES NFR BLD AUTO: 1 % (ref 0–2)
LYMPHOCYTES # BLD AUTO: 0.97 THOUSANDS/ΜL (ref 0.6–4.47)
LYMPHOCYTES NFR BLD AUTO: 10 % (ref 14–44)
MCH RBC QN AUTO: 31.9 PG (ref 26.8–34.3)
MCHC RBC AUTO-ENTMCNC: 32.9 G/DL (ref 31.4–37.4)
MCV RBC AUTO: 97 FL (ref 82–98)
MONOCYTES # BLD AUTO: 0.61 THOUSAND/ΜL (ref 0.17–1.22)
MONOCYTES NFR BLD AUTO: 6 % (ref 4–12)
NEUTROPHILS # BLD AUTO: 7.83 THOUSANDS/ΜL (ref 1.85–7.62)
NEUTS SEG NFR BLD AUTO: 83 % (ref 43–75)
NRBC BLD AUTO-RTO: 0 /100 WBCS
PLATELET # BLD AUTO: 144 THOUSANDS/UL (ref 149–390)
PMV BLD AUTO: 10.8 FL (ref 8.9–12.7)
POTASSIUM SERPL-SCNC: 4.4 MMOL/L (ref 3.5–5.3)
RBC # BLD AUTO: 3.23 MILLION/UL (ref 3.88–5.62)
SODIUM SERPL-SCNC: 137 MMOL/L (ref 136–145)
WBC # BLD AUTO: 9.49 THOUSAND/UL (ref 4.31–10.16)

## 2018-06-21 PROCEDURE — G8988 SELF CARE GOAL STATUS: HCPCS

## 2018-06-21 PROCEDURE — 97166 OT EVAL MOD COMPLEX 45 MIN: CPT

## 2018-06-21 PROCEDURE — 97116 GAIT TRAINING THERAPY: CPT

## 2018-06-21 PROCEDURE — 80048 BASIC METABOLIC PNL TOTAL CA: CPT | Performed by: ORTHOPAEDIC SURGERY

## 2018-06-21 PROCEDURE — 99024 POSTOP FOLLOW-UP VISIT: CPT | Performed by: ORTHOPAEDIC SURGERY

## 2018-06-21 PROCEDURE — 85025 COMPLETE CBC W/AUTO DIFF WBC: CPT | Performed by: ORTHOPAEDIC SURGERY

## 2018-06-21 PROCEDURE — 82948 REAGENT STRIP/BLOOD GLUCOSE: CPT

## 2018-06-21 PROCEDURE — 97530 THERAPEUTIC ACTIVITIES: CPT

## 2018-06-21 PROCEDURE — G8987 SELF CARE CURRENT STATUS: HCPCS

## 2018-06-21 PROCEDURE — 97110 THERAPEUTIC EXERCISES: CPT

## 2018-06-21 RX ORDER — GABAPENTIN 100 MG/1
100 CAPSULE ORAL 3 TIMES DAILY
Status: DISCONTINUED | OUTPATIENT
Start: 2018-06-21 | End: 2018-06-24 | Stop reason: HOSPADM

## 2018-06-21 RX ORDER — METHOCARBAMOL 750 MG/1
750 TABLET, FILM COATED ORAL EVERY 6 HOURS PRN
Status: DISCONTINUED | OUTPATIENT
Start: 2018-06-21 | End: 2018-06-24 | Stop reason: HOSPADM

## 2018-06-21 RX ORDER — FUROSEMIDE 40 MG/1
40 TABLET ORAL DAILY
Status: DISCONTINUED | OUTPATIENT
Start: 2018-06-21 | End: 2018-06-22

## 2018-06-21 RX ORDER — METOCLOPRAMIDE HYDROCHLORIDE 5 MG/ML
10 INJECTION INTRAMUSCULAR; INTRAVENOUS EVERY 6 HOURS PRN
Status: DISCONTINUED | OUTPATIENT
Start: 2018-06-21 | End: 2018-06-24 | Stop reason: HOSPADM

## 2018-06-21 RX ORDER — OLMESARTAN MEDOXOMIL 20 MG/1
40 TABLET ORAL DAILY
Status: DISCONTINUED | OUTPATIENT
Start: 2018-06-21 | End: 2018-06-22

## 2018-06-21 RX ADMIN — OXYCODONE HYDROCHLORIDE 10 MG: 10 TABLET ORAL at 08:13

## 2018-06-21 RX ADMIN — GABAPENTIN 100 MG: 100 CAPSULE ORAL at 22:29

## 2018-06-21 RX ADMIN — INSULIN LISPRO 2 UNITS: 100 INJECTION, SOLUTION INTRAVENOUS; SUBCUTANEOUS at 08:13

## 2018-06-21 RX ADMIN — OXYCODONE HYDROCHLORIDE 10 MG: 10 TABLET ORAL at 22:29

## 2018-06-21 RX ADMIN — ENOXAPARIN SODIUM 40 MG: 100 INJECTION SUBCUTANEOUS at 08:12

## 2018-06-21 RX ADMIN — OXYCODONE HYDROCHLORIDE AND ACETAMINOPHEN 500 MG: 500 TABLET ORAL at 08:13

## 2018-06-21 RX ADMIN — INSULIN LISPRO 1 UNITS: 100 INJECTION, SOLUTION INTRAVENOUS; SUBCUTANEOUS at 00:17

## 2018-06-21 RX ADMIN — ALLOPURINOL 100 MG: 100 TABLET ORAL at 17:24

## 2018-06-21 RX ADMIN — Medication 325 MG: at 08:12

## 2018-06-21 RX ADMIN — OLMESARTAN MEDOXOMIL 40 MG: 20 TABLET, FILM COATED ORAL at 10:12

## 2018-06-21 RX ADMIN — METHOCARBAMOL 750 MG: 750 TABLET, FILM COATED ORAL at 19:47

## 2018-06-21 RX ADMIN — TRAMADOL HYDROCHLORIDE 50 MG: 50 TABLET, FILM COATED ORAL at 05:04

## 2018-06-21 RX ADMIN — CARVEDILOL 25 MG: 25 TABLET, FILM COATED ORAL at 08:12

## 2018-06-21 RX ADMIN — INSULIN LISPRO 1 UNITS: 100 INJECTION, SOLUTION INTRAVENOUS; SUBCUTANEOUS at 12:11

## 2018-06-21 RX ADMIN — METOCLOPRAMIDE 10 MG: 5 INJECTION, SOLUTION INTRAMUSCULAR; INTRAVENOUS at 22:29

## 2018-06-21 RX ADMIN — FUROSEMIDE 40 MG: 40 TABLET ORAL at 08:12

## 2018-06-21 RX ADMIN — TRAMADOL HYDROCHLORIDE 50 MG: 50 TABLET, FILM COATED ORAL at 17:24

## 2018-06-21 RX ADMIN — TRAMADOL HYDROCHLORIDE 50 MG: 50 TABLET, FILM COATED ORAL at 12:11

## 2018-06-21 RX ADMIN — OXYCODONE HYDROCHLORIDE 10 MG: 10 TABLET ORAL at 12:11

## 2018-06-21 RX ADMIN — GABAPENTIN 100 MG: 100 CAPSULE ORAL at 16:16

## 2018-06-21 RX ADMIN — HYDROMORPHONE HYDROCHLORIDE 1 MG: 1 INJECTION, SOLUTION INTRAMUSCULAR; INTRAVENOUS; SUBCUTANEOUS at 05:31

## 2018-06-21 RX ADMIN — AMLODIPINE BESYLATE 10 MG: 10 TABLET ORAL at 08:12

## 2018-06-21 RX ADMIN — DOCUSATE SODIUM 100 MG: 100 CAPSULE, LIQUID FILLED ORAL at 17:25

## 2018-06-21 RX ADMIN — TRAMADOL HYDROCHLORIDE 50 MG: 50 TABLET, FILM COATED ORAL at 00:18

## 2018-06-21 RX ADMIN — PRAVASTATIN SODIUM 40 MG: 40 TABLET ORAL at 16:17

## 2018-06-21 RX ADMIN — HYDROMORPHONE HYDROCHLORIDE 1 MG: 1 INJECTION, SOLUTION INTRAMUSCULAR; INTRAVENOUS; SUBCUTANEOUS at 01:23

## 2018-06-21 RX ADMIN — CARVEDILOL 25 MG: 25 TABLET, FILM COATED ORAL at 16:17

## 2018-06-21 RX ADMIN — OXYCODONE HYDROCHLORIDE 10 MG: 10 TABLET ORAL at 04:18

## 2018-06-21 RX ADMIN — METHOCARBAMOL 750 MG: 750 TABLET, FILM COATED ORAL at 12:11

## 2018-06-21 RX ADMIN — CEFAZOLIN SODIUM 2000 MG: 10 INJECTION, POWDER, FOR SOLUTION INTRAVENOUS at 01:36

## 2018-06-21 RX ADMIN — DOCUSATE SODIUM 100 MG: 100 CAPSULE, LIQUID FILLED ORAL at 08:12

## 2018-06-21 RX ADMIN — SENNOSIDES 8.6 MG: 8.6 TABLET, FILM COATED ORAL at 08:12

## 2018-06-21 RX ADMIN — METOCLOPRAMIDE 10 MG: 5 INJECTION, SOLUTION INTRAMUSCULAR; INTRAVENOUS at 06:35

## 2018-06-21 RX ADMIN — HYDROMORPHONE HYDROCHLORIDE 1 MG: 1 INJECTION, SOLUTION INTRAMUSCULAR; INTRAVENOUS; SUBCUTANEOUS at 10:11

## 2018-06-21 RX ADMIN — Medication 325 MG: at 16:16

## 2018-06-21 RX ADMIN — ONDANSETRON 4 MG: 2 INJECTION, SOLUTION INTRAMUSCULAR; INTRAVENOUS at 01:50

## 2018-06-21 RX ADMIN — ONDANSETRON 4 MG: 2 INJECTION, SOLUTION INTRAMUSCULAR; INTRAVENOUS at 08:17

## 2018-06-21 RX ADMIN — METFORMIN HYDROCHLORIDE 500 MG: 500 TABLET, FILM COATED ORAL at 16:17

## 2018-06-21 RX ADMIN — METOCLOPRAMIDE 10 MG: 5 INJECTION, SOLUTION INTRAMUSCULAR; INTRAVENOUS at 12:10

## 2018-06-21 RX ADMIN — GABAPENTIN 100 MG: 100 CAPSULE ORAL at 12:11

## 2018-06-21 RX ADMIN — HYDROMORPHONE HYDROCHLORIDE 1 MG: 1 INJECTION, SOLUTION INTRAMUSCULAR; INTRAVENOUS; SUBCUTANEOUS at 14:45

## 2018-06-21 RX ADMIN — OXYCODONE HYDROCHLORIDE 10 MG: 10 TABLET ORAL at 16:17

## 2018-06-21 RX ADMIN — Medication 400 MCG: at 08:12

## 2018-06-21 RX ADMIN — OXYCODONE HYDROCHLORIDE AND ACETAMINOPHEN 500 MG: 500 TABLET ORAL at 17:24

## 2018-06-21 RX ADMIN — ALLOPURINOL 100 MG: 100 TABLET ORAL at 08:12

## 2018-06-21 RX ADMIN — INSULIN LISPRO 1 UNITS: 100 INJECTION, SOLUTION INTRAVENOUS; SUBCUTANEOUS at 17:26

## 2018-06-21 RX ADMIN — OXYCODONE HYDROCHLORIDE 10 MG: 10 TABLET ORAL at 00:17

## 2018-06-21 NOTE — OCCUPATIONAL THERAPY NOTE
OccupationalTherapy Evaluation     Patient Name: Doreen Jewell  DZVDG'O Date: 6/21/2018  Problem List  Patient Active Problem List   Diagnosis    Left knee pain    Primary osteoarthritis of left knee    Effusion of left knee    Arthritis of knee     Past Medical History  Past Medical History:   Diagnosis Date    Diabetes mellitus ([address] )     Gout     Hyperlipidemia     Hypertension      Past Surgical History  Past Surgical History:   Procedure Laterality Date    COLONOSCOPY      GALLBLADDER SURGERY      HEMORRHOID SURGERY      TX TOTAL KNEE ARTHROPLASTY Left 6/20/2018    Procedure: KNEE : COMPLETE REPLACEMENT;  Surgeon: Mariella Hyde MD;  [facility]: [facility];  Service: Orthopedics         06/21/18 1040   Note Type   Note type Eval/Treat   Restrictions/Precautions   Weight Bearing Precautions Per Order Yes   RUE Weight Bearing Per Order WBAT   LUE Weight Bearing Per Order WBAT   RLE Weight Bearing Per Order WBAT   LLE Weight Bearing Per Order FWB   Other Precautions WBS; Fall Risk;Pain   Pain Assessment   Pain Assessment 0-10   Pain Score Worst Possible Pain   Pain Type Acute pain   Pain Location Knee   Pain Orientation Left   Hospital Pain Intervention(s) Repositioned; Ambulation/increased activity; Distraction;Elevated; Emotional support   Home Living   Type of [address] [facility] One level   [address] [facility]   Prior Function   Level of [place] Independent with ADLs and functional mobility   Lives With Spouse   Receives Help From Family   ADL Assistance Independent   IADLs Independent   Falls in the last 6 months 0   Vocational Full time employment   Lifestyle   Autonomy I ADLS AND MOBILITY - I I ADLS - SHARES HOMEMAKING WITH SPOUSE   Reciprocal Relationships SUPPORTIVE FAMILY   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   Intrinsic Gratification ACTIVE PTA   Subjective   Subjective "I'T VERY HOT IN HERE"   ADL   Eating Assistance 7  Independent   Grooming Assistance 5 Supervision/Setup   UB Bathing Assistance 5  Supervision/Setup   LB Bathing Assistance 3  Moderate Assistance   UB Dressing Assistance 5  Supervision/Setup   LB Dressing Assistance 3  Moderate Assistance   Toileting Assistance  5  Supervision/Setup   Bed Mobility   Supine to Sit 4  Minimal assistance   Transfers   Sit to Stand 4  Minimal assistance   Stand to Sit 4  Minimal assistance   Stand pivot 4  Minimal assistance   Functional Mobility   Functional Mobility 4  Minimal assistance   Additional items Rolling walker   Balance   Static Sitting Fair +   Dynamic Sitting Fair   Static Standing Fair -   Dynamic Standing Fair -   Ambulatory Fair -   Activity Tolerance   Activity Tolerance Patient limited by fatigue;Patient limited by pain  (SELF LIMITING AT TIMES)   RUE Assessment   RUE Assessment WFL   LUE Assessment   LUE Assessment WFL   Cognition   Overall Cognitive Status WFL   Assessment   Limitation Decreased ADL status; Decreased endurance;Decreased self-care trans;Decreased high-level ADLs   Prognosis Good   Assessment Pt is a 63 y o  male who was admitted to [facility] on 6/20/2018 with Arthritis of knee s/p L TKR  Pt's problem list also includes PMH of DM, HTN and arthritis, hld, gout  At baseline pt was completing adls and mobility independently  Pt lives with spouse in 1 story home with 4 ELKE  Currently pt requires moderate assist for overall ADLS and min assist for functional mobility/transfers  Pt currently presents with impairments in the following categories -steps to enter environment, difficulty performing ADLS and difficulty performing IADLS  activity tolerance, endurance and standing balance/tolerance   These impairments, as well as pt's fatigue, pain, orthopedic restricitions , WBS  and risk for falls  limit pt's ability to safely engage in all baseline areas of occupation, includingbathing, dressing, toileting, functional mobility/transfers, community mobility, house maintenance, work/volunteer work , social participation  and leisure activities  From OT standpoint, recommend home with family support upon D/C  OT will continue to follow to address the below stated goals  Goals   Patient Goals have less pain   STG Time Frame 3-5   Short Term Goal #1 refer to established goals below   Plan   Treatment Interventions ADL retraining;Functional transfer training; Endurance training;Patient/family training;Equipment evaluation/education; Compensatory technique education; Activityengagement   Goal Expiration Date 06/26/18   OT Frequency 3-5x/wk   Recommendation   OT Discharge Recommendation Home with family support   OT - OK to Discharge Yes   Barthel Index   Feeding 10   Bathing 0   Grooming Score 5   Dressing Score 5   Bladder Score 10   Bowels Score 10   Toilet Use Score 5   Transfers (Bed/Chair) Score 10   Mobility (Level Surface) Score 0   Stairs Score 0   Barthel Index Score 55       OCCUPATIONAL THERAPY GOALS:    *MOD I ADLS AFTER SETUP WITH USE OF ADAPTIVE DEVICES PRN  *MOD I TOILETING AND CLOTHING MANAGEMENT   *MOD I FUNCTIONAL MOB AND TRANSFERS TO ALL SURFACES WITH FAIR+ TO GOOD BALANCE/SAFETY FOR PARTICIPATION IN DYNAMIC ADLS   *DEMONSTRATE GOOD CARRYOVER WITH SAFE USE OF RW DURING FUNCTIONAL TASKS  *ASSESS DME NEEDS  *INCREASE ACTIVITY TOLERANCE TO 40-45 MIN FOR PARTICIPATION IN ADLS AND ENJOYABLE ACTIVITIES     * ASSIST WITH SAFE D/C RECOMMENDATIONS     Kael Arriaza, OT

## 2018-06-21 NOTE — PROGRESS NOTES
Internal Medicine Progress Note  Patient: Jae Valverde  Age/sex: 63 y o  male  Medical Record #: [id number]      ASSESSMENT/PLAN:  Jae Valverde is seen and examined and mangement for following issues:       # L TKR:  Pain management per ortho; monitor for constipation w/use of narcotics for pain; cont lovenox for DVT prophylaxis     # HTN:  bmp/bp is stable this a m , lasix/benicar resumed;      # DM II: cont metformin and sliding scale; monitor BS and titrate based on trend     # HLD: cont statin therapy;      # Anemia: hgb w/mild drop as expected post op; cont ferrous sulfate; cbc in a m ; pt asymptomatic         Subjective: Patient seen and examined   New or overnight issues include no significant overnight events or reported nursing issues    ROS:   GI: denies abdominal pain, change bowel habits or reflux symptoms  Neuro: No new neurologic changes  Respiratory: No Cough, SOB  Cardiovascular: No CP, palpitations   Musculoskeletal: + knee pain    Scheduled Meds:    Current Facility-Administered Medications:  allopurinol 100 mg Oral BID Song Aguilera    amLODIPine 10 mg Oral Daily EDWIN Chauhan    ascorbic acid 500 mg Oral BID Joe Rolando    aspirin 81 mg Oral Every Other Day Joe Marshall    carvedilol 25 mg Oral BID With Meals Alma Aguilera    docusate sodium 100 mg Oral BID Song Aguilera    enoxaparin 40 mg Subcutaneous Daily Joe Rolando    ferrous sulfate 325 mg Oral BID With Meals Joe Rolando    folic acid 334 mcg Oral Daily Song Aguilera    furosemide 40 mg Oral Daily EDWIN Benedict    HYDROmorphone 1 mg Intravenous Q4H PRN Joe Rolando    insulin lispro 1-5 Units Subcutaneous TID FRANCE Snow DO    lactated ringers 1 5 mL/kg/hr Intravenous Continuous Joe Rolando Last Rate: 1 5 mL/kg/hr (06/20/18 2125)   metFORMIN 500 mg Oral Daily Song Aguilera    metoclopramide 10 mg Intravenous Q6H PRN Song Aguilera    olmesartan 40 mg Oral Daily EDWIN Chauhan    ondansetron 4 mg Intravenous Q4H PRN Milagros Samantha    oxyCODONE 10 mg Oral Q4H PRN Milagros [place]    oxyCODONE 5 mg Oral Q4H PRN Milagros Samantha    pravastatin 40 mg Oral Daily With [employer]    senna 1 tablet Oral Daily Milagros Samantha    traMADol 50 mg Oral Q6H [address]        Labs:       Results from last 7 days  Lab Units 06/21/18  0447   WBC Thousand/uL 9 49   HEMOGLOBIN g/dL 10 3*   HEMATOCRIT % 31 3*   PLATELETS Thousands/uL 144*       Results from last 7 days  Lab Units 06/21/18  0447   SODIUM mmol/L 137   POTASSIUM mmol/L 4 4   CHLORIDE mmol/L 102   CO2 mmol/L 27   BUN mg/dL 15   CREATININE mg/dL 1 07   GLUCOSE RANDOM mg/dL 184*   CALCIUM mg/dL 8 7                Glucose (mg/dL)   Date Value   06/21/2018 184 (H)       Labs reviewed    Physical Examination:  Vitals:   Vitals:    06/20/18 1900 06/20/18 2332 06/21/18 0252 06/21/18 0728   BP: 140/79 143/92 145/83 153/88   BP Location: Right arm Right arm Right arm Right arm   Pulse: 68 71 70 76   Resp: 18 18 18 18   Temp: 97 8 °F (36 6 °C) 98 °F (36 7 °C) 98 7 °F (37 1 °C) (!) 97 4 °F (36 3 °C)   TempSrc: Oral Oral Oral Oral   SpO2: 98% 98% 97% 93%   Weight:       Height:           GEN: NAD  RESP: CTAB, no R/R/W  CV: +S1 S2, regular rate, no rubs  ABD: soft, NT, ND, normal BS   : catheter removed; pt voided x1  EXT: DP pulses intact b/l; dressing intact to knee  Neuro: AAOx3    [x ] Total time spent: 30 Mins and greater than 50% of this time was spent counseling/coordinating care

## 2018-06-21 NOTE — NURSING NOTE
Pt A&OX4, resting in bed comfortably  Pain medication given  Norbert bell in reach  Will continue to monitor pt closely

## 2018-06-21 NOTE — PLAN OF CARE
Problem: PHYSICAL THERAPY ADULT  Goal: Performs mobility at highest level of function for planned discharge setting  See evaluation for individualized goals  Treatment/Interventions: Functional transfer training, LE strengthening/ROM, Elevations, Therapeutic exercise, Endurance training, Bed mobility, Gait training, Spoke to nursing, Family  Equipment Recommended: Annamaria Quijano       See flowsheet documentation for full assessment, interventions and recommendations  Prognosis: Good  Problem List: Decreased strength, Decreased range of motion, Decreased endurance, Impaired balance, Decreased mobility, Pain  Assessment: pt progressed w mob and fucnt activity tolerance; requires supervision w transf and amb 60 ft x2 using RW LLE WBAT- gait w improved stability, decreased L stance, intermittently initiating stepo through, cont to be walker reliant; requires min conatct assist w 5+5 steps asecnt/descent w R rail support step to apttern; pt cont to be apprehensive re: d/c home "I need moral support"; Pt reassured to focus on funct progress as metric for competance w safe mob; wife present andin agreement; rec cont  PT home w fam support outpt PT when med cleared        Recommendation: Outpatient PT, Home with family support          See flowsheet documentation for full assessment

## 2018-06-21 NOTE — SOCIAL WORK
CM met with patient and explained cm role  Pt alert and oriented  Pt reports he lives in a multi-level home with his wife Noelle  [phone] 4 campos  Pt reports DME: cane, and rw, denies VNA , and rehab  Pt reports being independent PTA, reports good support at home from family and friends, he drives and has transport home with wife for d/c  Pts pharmacy is [facility] on [address] in [facility]  No POA  Pt denies hx/admission for drug/etoh and psych/mental health  Pt reports he filled his Lovenox  Outpt PT scheduled for [address] for 0/05 @ 9am  Pt agreeable to use meebee for commode  CM sent referral via [facility] to [facility] for commode  CM reviewed d/c planning process including the following: identifying help at home, patient preference for d/c planning needs, Discharge Lounge, [facility] Meds to Bed program, availability of treatment team to discuss questions or concerns patient and/or family may have regarding understanding medications and recognizing signs and symptoms once discharged  CM also encouraged patient to follow up with all recommended appointments after discharge  Patient advised of importance for patient and family to participate in managing patients medical well being

## 2018-06-21 NOTE — PHYSICAL THERAPY NOTE
PT Progress Note     06/21/18 5527   Pain Assessment   Pain Assessment 0-10   Pain Score 6   Pain Type Acute pain;Surgical pain   Pain Location Knee   Pain Orientation Left   Hospital Pain Intervention(s) Ambulation/increased activity   Response to Interventions increased pain w mob to 7/10   Restrictions/Precautions   LLE Weight Bearing Per Order WBAT   General   Chart Reviewed Yes   Response to Previous Treatment Patient with no complaints from previous session  Family/Caregiver Present No   Cognition   Overall Cognitive Status WFL   Arousal/Participation Alert   Attention Within functional limits   Orientation Level Oriented to person;Oriented to place;Oriented to situation   Memory Within functional limits   Following Commands Follows multistep commands with increased time or repetition   Subjective   Subjective pt in chair nsg cleared, pt agreeable for PT; spouse present and would likel pt to go home   Transfers   Sit to Stand 5  Supervision   Additional items Increased time required;Verbal cues   Stand to Sit 5  Supervision   Additional items Increased time required;Verbal cues   Stand pivot 5  Supervision   Additional items Increased time required;Verbal cues  (RW)   Ambulation/Elevation   Gait pattern Improper Weight shift; Antalgic;Decreased foot clearance;Decreased L stance; Short stride; Step to;Excessively slow   Gait Assistance 5  Supervision   Additional items Verbal cues   Assistive Device Rolling walker   Distance 60 ft x2   Stair Management Assistance 4  Minimal assist   Additional items Verbal cues; Tactile cues   Stair Management Technique Step to pattern; One rail R   Number of Stairs 10   Balance   Dynamic Sitting Fair +  (multidirectional reach)   Static Standing Fair +  (RW)   Dynamic Standing Fair -  (RW)   Endurance Deficit   Endurance Deficit Yes   Activity Tolerance   Activity Tolerance Patient limited by fatigue;Patient limited by pain   Nurse Made Aware yes   Assessment   Prognosis Good Problem List Decreased strength;Decreased range of motion;Decreased endurance; Impaired balance;Decreased mobility;Pain   Assessment pt progressed w mob and fucnt activity tolerance; requires supervision w transf and amb 60 ft x2 using RW LLE WBAT- gait w improved stability, decreased L stance, intermittently initiating stepo through, cont to be walker reliant; requires min conatct assist w 5+5 steps asecnt/descent w R rail support step to apttern; pt cont to be apprehensive re: d/c home "I need moral support"; Pt reassured to focus on funct progress as metric for competance w safe mob; wife present andin agreement; rec cont  PT home w fam support outpt PT when med cleared   Goals   Patient Goals not expressed   STG Expiration Date 06/30/18   Short Term Goal #1 as per PT assessment: 7-10 days  Pt will amb 150 ft w/ rw, mod (I) in order to facilitate safe return to premorbid environment and community amb status  Pt will negotiate 4 steps --> 12 steps w/ hand rail and SPC, mod (I) in order to navigate in and out of the house and between levels of home environment safety  Pt will achieve (I) level w/ bed mob in order to facilitate safety with OOB and back to bed transitions in own living environment  Pt will perform transfers w/ mod (I) to assure (I) and safety w/ functional mobility/transitions w/ all aspects of mobility/locomotion  Pt will participate in LE therex and balance activities to max progression w/ mobility skills  Plan   Treatment/Interventions Functional transfer training;LE strengthening/ROM; Elevations; Therapeutic exercise; Endurance training;Cognitive reorientation;Patient/family training;Equipment eval/education; Bed mobility;Gait training;Continued evaluation; Compensatory technique education;Spoke to nursing   Progress Progressing toward goals   PT Frequency Twice a day   Recommendation   Recommendation Outpatient PT; Home with family support   Equipment Recommended Clemencia Johansen

## 2018-06-21 NOTE — PROGRESS NOTES
Progress Note - Anesthesia Acute Pain Management    Amina Cross 63 y o  male MRN: [id number]  Unit/Bed#: [facility] 346-01 Encounter: [id number]        Assessment:   Principal Problem:    Arthritis of knee      Plan: Patient having pain but it is controlled  Continue same    No obvious complications from his nerve block or spinal       Pain History  Current pain location(s): left knee  Pain Scale:   7/10  Current Analgesic regimen:  See below      Meds/Allergies   current meds:   Current Facility-Administered Medications   Medication Dose Route Frequency    allopurinol (ZYLOPRIM) tablet 100 mg  100 mg Oral BID    amLODIPine (NORVASC) tablet 10 mg  10 mg Oral Daily    ascorbic acid (VITAMIN C) tablet 500 mg  500 mg Oral BID    aspirin (ECOTRIN LOW STRENGTH) EC tablet 81 mg  81 mg Oral Every Other Day    carvedilol (COREG) tablet 25 mg  25 mg Oral BID With Meals    docusate sodium (COLACE) capsule 100 mg  100 mg Oral BID    enoxaparin (LOVENOX) subcutaneous injection 40 mg  40 mg Subcutaneous Daily    ferrous sulfate tablet 325 mg  325 mg Oral BID With Meals    folic acid (FOLVITE) tablet 400 mcg  400 mcg Oral Daily    furosemide (LASIX) tablet 40 mg  40 mg Oral Daily    gabapentin (NEURONTIN) capsule 100 mg  100 mg Oral TID    HYDROmorphone (DILAUDID) injection 1 mg  1 mg Intravenous Q4H PRN    insulin lispro (HumaLOG) 100 units/mL subcutaneous injection 1-5 Units  1-5 Units Subcutaneous TID AC    metFORMIN (GLUCOPHAGE) tablet 500 mg  500 mg Oral Daily    methocarbamol (ROBAXIN) tablet 750 mg  750 mg Oral Q6H PRN    metoclopramide (REGLAN) injection 10 mg  10 mg Intravenous Q6H PRN    olmesartan (BENICAR) tablet 40 mg  40 mg Oral Daily    ondansetron (ZOFRAN) injection 4 mg  4 mg Intravenous Q4H PRN    oxyCODONE (ROXICODONE) immediate release tablet 10 mg  10 mg Oral Q4H PRN    oxyCODONE (ROXICODONE) IR tablet 5 mg  5 mg Oral Q4H PRN    pravastatin (PRAVACHOL) tablet 40 mg  40 mg Oral Daily With Dinner    senna (SENOKOT) tablet 8 6 mg  1 tablet Oral Daily    traMADol (ULTRAM) tablet 50 mg  50 mg Oral Q6H [address]         Allergies   Allergen Reactions    Acetaminophen Hives       Objective     Temp:  [97 4 °F (36 3 °C)-98 7 °F (37 1 °C)] 97 9 °F (36 6 °C)  HR:  [57-77] 77  Resp:  [16-19] 16  BP: (140-153)/(76-92) 148/76      Intake/Output Summary (Last 24 hours) at 06/21/18 1548  Last data filed at 06/21/18 1300   Gross per 24 hour   Intake          2301 64 ml   Output             1026 ml   Net          1275 64 ml         Counseling / Coordination of Care  Total floor / unit time spent today 15 minutes   Greater than 50% of total time was spent with the patient and / or family counseling and / or coordination of care      SIGNATURE: Latrice Webb MD  DATE: June 21, 2018  TIME: 3:48 PM

## 2018-06-21 NOTE — PHYSICAL THERAPY NOTE
PT Progress Note     06/21/18 1500   Pain Assessment   Pain Assessment 0-10   Pain Score Worst Possible Pain   Pain Type Acute pain;Surgical pain   Pain Location Knee   Pain Orientation Left   Hospital Pain Intervention(s) Ambulation/increased activity;Repositioned   Response to Interventions unchanged   Restrictions/Precautions   LLE Weight Bearing Per Order WBAT   General   Chart Reviewed Yes   Family/Caregiver Present No   Cognition   Overall Cognitive Status WFL   Attention Within functional limits   Orientation Level Oriented to person;Oriented to place;Oriented to situation   Memory Within functional limits   Following Commands Follows multistep commands with increased time or repetition   Subjective   Subjective pt in bed, nsg cleared, pt agreeable for PT   Bed Mobility   Sit to Supine 4  Minimal assistance   Additional items Increased time required;Verbal cues;LE management   Transfers   Sit to Stand 4  Minimal assistance   Additional items Increased time required;Verbal cues   Stand to Sit 4  Minimal assistance   Additional items Increased time required;Verbal cues   Stand pivot 4  Minimal assistance   Additional items Increased time required;Verbal cues  (RW)   Toilet transfer 4  Minimal assistance   Additional items Increased time required;Verbal cues;Raised toilet seat   Ambulation/Elevation   Gait pattern Improper Weight shift; Antalgic;Decreased R stance;Decreased foot clearance; Foward flexed; Short stride; Step to;Excessively slow   Gait Assistance 4  Minimal assist   Additional items Verbal cues; Tactile cues   Assistive Device Rolling walker   Distance 15ft x2   Balance   Dynamic Sitting Fair  (forward reach)   Static Standing Fair  (RW)   Dynamic Standing Poor +  (RW)   Endurance Deficit   Endurance Deficit Yes   Endurance Deficit Description antalgic LE instability   Activity Tolerance   Activity Tolerance Patient limited by fatigue;Patient limited by pain   Nurse [address]    Exercises Quad Sets Sitting   Glute Sets Sitting   Hip Abduction Sitting;AAROM   Hip Adduction Sitting;AAROM   Ankle Pumps Sitting;AROM   Assessment   Prognosis Good   Problem List Decreased strength;Decreased range of motion;Decreased endurance; Impaired balance;Decreased mobility;Pain   Assessment pt progressed w mob and funct activity tolerance; requires min assist w transf and amb 15 ft x2 using RW - gait pattern antalgic, decreased L stance step to walker reliant + falls risk; pain inadequately controlled 10/10, pt very apprehensive about pain w mob; this session pt extensively educated re: pain mgt, expectation, benefits of mob; initiated LE ex as noted; also assisted pt w toileting at request; pt had increassed difficulty w eccentric lowering to raised toilet seat; rec cont PT home w fam support, outpt PT when med cleared   Goals   Patient Goals decrease pain   STG Expiration Date 06/30/18   Short Term Goal #1 as per PT assessment: 7-10 days  Pt will amb 150 ft w/ rw, mod (I) in order to facilitate safe return to premorbid environment and community amb status  Pt will negotiate 4 steps --> 12 steps w/ hand rail and SPC, mod (I) in order to navigate in and out of the house and between levels of home environment safety  Pt will achieve (I) level w/ bed mob in order to facilitate safety with OOB and back to bed transitions in own living environment  Pt will perform transfers w/ mod (I) to assure (I) and safety w/ functional mobility/transitions w/ all aspects of mobility/locomotion  Pt will participate in LE therex and balance activities to max progression w/ mobility skills  Plan   Treatment/Interventions Functional transfer training;Elevations;LE strengthening/ROM; Therapeutic exercise; Endurance training;Cognitive reorientation;Patient/family training;Equipment eval/education; Bed mobility; Compensatory technique education;Gait training;Continued evaluation;Spoke to nursing;Family;Spoke to advanced practitioner;Spoke to case management   Progress Progressing toward goals   PT Frequency Twice a day   Recommendation   Recommendation Outpatient PT; Home with family support   Equipment Recommended Iam Lora

## 2018-06-21 NOTE — PLAN OF CARE
Problem: OCCUPATIONAL THERAPY ADULT  Goal: Performs self-care activities at highest level of function for planned discharge setting  See evaluation for individualized goals  Treatment Interventions: ADL retraining, Functional transfer training, Endurance training, Patient/family training, Equipment evaluation/education, Compensatory technique education, Activityengagement          See flowsheet documentation for full assessment, interventions and recommendations  Limitation: Decreased ADL status, Decreased endurance, Decreased self-care trans, Decreased high-level ADLs  Prognosis: Good  Assessment: Pt is a 63 y o  male who was admitted to [facility] on 6/20/2018 with Arthritis of knee s/p L TKR  Pt's problem list also includes PMH of DM, HTN and arthritis, hld, gout  At baseline pt was completing adls and mobility independently  Pt lives with spouse in 1 story home with 4 ELKE  Currently pt requires moderate assist for overall ADLS and min assist for functional mobility/transfers  Pt currently presents with impairments in the following categories -steps to enter environment, difficulty performing ADLS and difficulty performing IADLS  activity tolerance, endurance and standing balance/tolerance  These impairments, as well as pt's fatigue, pain, orthopedic restricitions , WBS  and risk for falls  limit pt's ability to safely engage in all baseline areas of occupation, includingbathing, dressing, toileting, functional mobility/transfers, community mobility, house maintenance, work/volunteer work , social participation  and leisure activities  From OT standpoint, recommend home with family support upon D/C  OT will continue to follow to address the below stated goals  OT Discharge Recommendation: Home with family support  OT - OK to Discharge:  Yes

## 2018-06-21 NOTE — POST OP PROGRESS NOTES
[facility] 63 y o  male MRN: [id number]  Unit/Bed#: CW3 346-01      Subjective:  63 y o male post operative day 1 sp left total knee arthroplasty   Patient has pain and nausea    Labs:    0  Lab Value Date/Time   HCT 31 3 (L) 06/21/2018 0447   HCT 36 9 05/17/2018 1230   HGB 10 3 (L) 06/21/2018 0447   HGB 12 8 05/17/2018 1230   INR 1 08 05/17/2018 1230   WBC 9 49 06/21/2018 0447   WBC 5 52 05/17/2018 1230       Meds:    Current Facility-Administered Medications:     allopurinol (ZYLOPRIM) tablet 100 mg, 100 mg, Oral, BID, [place] Pimple, 100 mg at 06/20/18 1718    amLODIPine (NORVASC) tablet 10 mg, 10 mg, Oral, Daily, EDWIN Araya    ascorbic acid (VITAMIN C) tablet 500 mg, 500 mg, Oral, BID, [place] Pimple, 500 mg at 06/20/18 1718    aspirin (ECOTRIN LOW STRENGTH) EC tablet 81 mg, 81 mg, Oral, Every Other Day, [place] Pimple    carvedilol (COREG) tablet 25 mg, 25 mg, Oral, BID With Meals, [place] Pimple, 25 mg at 06/20/18 1718    docusate sodium (COLACE) capsule 100 mg, 100 mg, Oral, BID, [place] Pimple, 100 mg at 06/20/18 1719    enoxaparin (LOVENOX) subcutaneous injection 40 mg, 40 mg, Subcutaneous, Daily, [place] Pimple    ferrous sulfate tablet 325 mg, 325 mg, Oral, BID With Meals, [place] Pimple, 325 mg at 30/96/51 1482    folic acid (FOLVITE) tablet 400 mcg, 400 mcg, Oral, Daily, [place] Pimple    HYDROmorphone (DILAUDID) injection 1 mg, 1 mg, Intravenous, Q4H PRN, [place] Pimple, 1 mg at 06/21/18 0531    insulin lispro (HumaLOG) 100 units/mL subcutaneous injection 1-5 Units, 1-5 Units, Subcutaneous, TID AC, 1 Units at 06/21/18 0017 **AND** Fingerstick Glucose (POCT), , , TID AC, Olive Fran, DO    lactated ringers infusion, 1 5 mL/kg/hr, Intravenous, Continuous, [place] Pimple, Last Rate: 137 4 mL/hr at 06/20/18 2125, 1 5 mL/kg/hr at 06/20/18 2125    metFORMIN (GLUCOPHAGE) tablet 500 mg, 500 mg, Oral, Daily, [place] Pimple, 500 mg at 06/20/18 1727    metoclopramide (REGLAN) injection 10 mg, 10 mg, Intravenous, Q6H PRN, Milagros Samantha    ondansetron [facility]) injection 4 mg, 4 mg, Intravenous, Q4H PRN, Milagros Samantha, 4 mg at 06/21/18 0150    oxyCODONE (ROXICODONE) immediate release tablet 10 mg, 10 mg, Oral, Q4H PRN, Milagros Samantha, 10 mg at 06/21/18 0418    oxyCODONE (ROXICODONE) IR tablet 5 mg, 5 mg, Oral, Q4H PRN, Milagros Samantha    pravastatin (PRAVACHOL) tablet 40 mg, 40 mg, Oral, Daily With Reza Rodriguez, 40 mg at 06/20/18 1718    senna (SENOKOT) tablet 8 6 mg, 1 tablet, Oral, Daily, Milagros Samantha    traMADol Ayan Jefe) tablet 50 mg, 50 mg, Oral, Q6H [facility], Milagros Samantha, 50 mg at 06/21/18 0504    Blood Culture:   No results found for: BLOODCX    Wound Culture:   No results found for: WOUNDCULT    Ins and Outs:  I/O last 24 hours: In: 3306 6 [P O :400; I V :2906 6]  Out: 1511 [Urine:936; Drains:475; Blood:100]          Physical:  Vitals:    06/21/18 0252   BP: 145/83   Pulse: 70   Resp: 18   Temp: 98 7 °F (37 1 °C)   SpO2: 97%     left lower extremity:  · Dressings C/D/I  · Toes warm and well perfused  · + ankle df/pf, ehl/fhl motor functions  · Sensation intact sp/dp distribution  · HVx1 intact, draining bloody fluid    _*_*_*_*_*_*_*_*_*_*_*_*_*_*_*_*_*_*_*_*_*_*_*_*_*_*_*_*_*_*_*_*_*_*_*_*_*_*_*_*_*    Assessment: 62 y o male post operative day 1 left total knee arthroplasty   Doing well    Plan:  · Weight Bearing as tolerated LLE  · Up and out of bed  · DVT prophylaxis - lovenox, mechanical  · Drain check later  · Acute pain service for all pain medicaiton management  · Analgesics  · PT/OT  · Patient noted to have acute blood loss anemia due to a drop in Hbg of > 2 0g from preop levels, will monitor vital signs and resuscitate with IV fluids as needed   · Dispo: PT anamika Singh   06/21/18

## 2018-06-21 NOTE — PROGRESS NOTES
Pt upset about pain medication administration  Pt educated on scheduled vs PRN meds with no evidence of understanding  Pt states "I'm supposed to be due for more medicine now and no one is giving it to me " Pt educated on fact that PO medication is ordered every 4 hours as needed and IV medication is ordered for breakthrough pain as needed, at least one hour after the PO med is given  Pt also fixated on fact that blood sugar was high  Ortho notified of elevated blood sugar and sliding scale insulin would be ordered  Pt fixated on fact that insulin still has not big given, accusing nurse of "sending his organs into failure due to his high blood sugar"  Pt educated on fact that nurse was still awaiting insulin from pharmacy and as soon as it arrives, it will be administered  Will continue to monitor pt and continue to care for pt within physician plan of care

## 2018-06-21 NOTE — PLAN OF CARE
Problem: PHYSICAL THERAPY ADULT  Goal: Performs mobility at highest level of function for planned discharge setting  See evaluation for individualized goals  Treatment/Interventions: Functional transfer training, LE strengthening/ROM, Elevations, Therapeutic exercise, Endurance training, Bed mobility, Gait training, Spoke to nursing, Family  Equipment Recommended: Heena Montelongo       See flowsheet documentation for full assessment, interventions and recommendations  Prognosis: Good  Problem List: Decreased strength, Decreased range of motion, Decreased endurance, Impaired balance, Decreased mobility, Pain  Assessment: pt progressed w mob and funct activity tolerance; requires min assist w transf and amb 15 ft x2 using RW - gait pattern antalgic, decreased L stance step to walker reliant + falls risk; pain inadequately controlled 10/10, pt very apprehensive about pain w mob; this session pt extensively educated re: pain mgt, expectation, benefits of mob; initiated LE ex as noted; also assisted pt w toileting at request; pt had increassed difficulty w eccentric lowering to raised toilet seat; rec cont PT home w fam support, outpt PT when med cleared        Recommendation: Outpatient PT, Home with family support          See flowsheet documentation for full assessment

## 2018-06-22 LAB
ANION GAP SERPL CALCULATED.3IONS-SCNC: 8 MMOL/L (ref 4–13)
BASOPHILS # BLD AUTO: 0.01 THOUSANDS/ΜL (ref 0–0.1)
BASOPHILS NFR BLD AUTO: 0 % (ref 0–1)
BUN SERPL-MCNC: 23 MG/DL (ref 5–25)
CALCIUM SERPL-MCNC: 8.2 MG/DL (ref 8.3–10.1)
CHLORIDE SERPL-SCNC: 95 MMOL/L (ref 100–108)
CO2 SERPL-SCNC: 27 MMOL/L (ref 21–32)
CREAT SERPL-MCNC: 2.11 MG/DL (ref 0.6–1.3)
EOSINOPHIL # BLD AUTO: 0.14 THOUSAND/ΜL (ref 0–0.61)
EOSINOPHIL NFR BLD AUTO: 1 % (ref 0–6)
ERYTHROCYTE [DISTWIDTH] IN BLOOD BY AUTOMATED COUNT: 11.8 % (ref 11.6–15.1)
GFR SERPL CREATININE-BSD FRML MDRD: 38 ML/MIN/1.73SQ M
GLUCOSE SERPL-MCNC: 158 MG/DL (ref 65–140)
GLUCOSE SERPL-MCNC: 175 MG/DL (ref 65–140)
GLUCOSE SERPL-MCNC: 192 MG/DL (ref 65–140)
GLUCOSE SERPL-MCNC: 200 MG/DL (ref 65–140)
GLUCOSE SERPL-MCNC: 200 MG/DL (ref 65–140)
HCT VFR BLD AUTO: 28.4 % (ref 36.5–49.3)
HGB BLD-MCNC: 9.4 G/DL (ref 12–17)
IMM GRANULOCYTES # BLD AUTO: 0.03 THOUSAND/UL (ref 0–0.2)
IMM GRANULOCYTES NFR BLD AUTO: 0 % (ref 0–2)
LYMPHOCYTES # BLD AUTO: 1.9 THOUSANDS/ΜL (ref 0.6–4.47)
LYMPHOCYTES NFR BLD AUTO: 19 % (ref 14–44)
MCH RBC QN AUTO: 32.2 PG (ref 26.8–34.3)
MCHC RBC AUTO-ENTMCNC: 33.1 G/DL (ref 31.4–37.4)
MCV RBC AUTO: 97 FL (ref 82–98)
MONOCYTES # BLD AUTO: 1.13 THOUSAND/ΜL (ref 0.17–1.22)
MONOCYTES NFR BLD AUTO: 11 % (ref 4–12)
NEUTROPHILS # BLD AUTO: 7.05 THOUSANDS/ΜL (ref 1.85–7.62)
NEUTS SEG NFR BLD AUTO: 69 % (ref 43–75)
NRBC BLD AUTO-RTO: 0 /100 WBCS
PLATELET # BLD AUTO: 133 THOUSANDS/UL (ref 149–390)
PMV BLD AUTO: 10.2 FL (ref 8.9–12.7)
POTASSIUM SERPL-SCNC: 3.7 MMOL/L (ref 3.5–5.3)
RBC # BLD AUTO: 2.92 MILLION/UL (ref 3.88–5.62)
SODIUM SERPL-SCNC: 130 MMOL/L (ref 136–145)
WBC # BLD AUTO: 10.26 THOUSAND/UL (ref 4.31–10.16)

## 2018-06-22 PROCEDURE — 99024 POSTOP FOLLOW-UP VISIT: CPT | Performed by: ORTHOPAEDIC SURGERY

## 2018-06-22 PROCEDURE — G8989 SELF CARE D/C STATUS: HCPCS

## 2018-06-22 PROCEDURE — 97535 SELF CARE MNGMENT TRAINING: CPT

## 2018-06-22 PROCEDURE — 97110 THERAPEUTIC EXERCISES: CPT

## 2018-06-22 PROCEDURE — 97116 GAIT TRAINING THERAPY: CPT

## 2018-06-22 PROCEDURE — 82948 REAGENT STRIP/BLOOD GLUCOSE: CPT

## 2018-06-22 PROCEDURE — 80048 BASIC METABOLIC PNL TOTAL CA: CPT | Performed by: ORTHOPAEDIC SURGERY

## 2018-06-22 PROCEDURE — 85025 COMPLETE CBC W/AUTO DIFF WBC: CPT | Performed by: ORTHOPAEDIC SURGERY

## 2018-06-22 RX ORDER — DOCUSATE SODIUM 100 MG/1
100 CAPSULE, LIQUID FILLED ORAL 2 TIMES DAILY
Qty: 10 CAPSULE | Refills: 0 | Status: SHIPPED | OUTPATIENT
Start: 2018-06-22

## 2018-06-22 RX ORDER — SODIUM CHLORIDE 9 MG/ML
100 INJECTION, SOLUTION INTRAVENOUS CONTINUOUS
Status: DISCONTINUED | OUTPATIENT
Start: 2018-06-22 | End: 2018-06-24 | Stop reason: HOSPADM

## 2018-06-22 RX ADMIN — TRAMADOL HYDROCHLORIDE 50 MG: 50 TABLET, FILM COATED ORAL at 11:12

## 2018-06-22 RX ADMIN — METHOCARBAMOL 750 MG: 750 TABLET, FILM COATED ORAL at 23:32

## 2018-06-22 RX ADMIN — GABAPENTIN 100 MG: 100 CAPSULE ORAL at 21:47

## 2018-06-22 RX ADMIN — ASPIRIN 81 MG: 81 TABLET, COATED ORAL at 07:40

## 2018-06-22 RX ADMIN — METHOCARBAMOL 750 MG: 750 TABLET, FILM COATED ORAL at 07:41

## 2018-06-22 RX ADMIN — OXYCODONE HYDROCHLORIDE 10 MG: 10 TABLET ORAL at 11:43

## 2018-06-22 RX ADMIN — ALLOPURINOL 100 MG: 100 TABLET ORAL at 07:40

## 2018-06-22 RX ADMIN — Medication 325 MG: at 07:41

## 2018-06-22 RX ADMIN — CARVEDILOL 25 MG: 25 TABLET, FILM COATED ORAL at 07:40

## 2018-06-22 RX ADMIN — DOCUSATE SODIUM 100 MG: 100 CAPSULE, LIQUID FILLED ORAL at 17:33

## 2018-06-22 RX ADMIN — Medication 400 MCG: at 07:41

## 2018-06-22 RX ADMIN — SENNOSIDES 8.6 MG: 8.6 TABLET, FILM COATED ORAL at 07:41

## 2018-06-22 RX ADMIN — METFORMIN HYDROCHLORIDE 500 MG: 500 TABLET, FILM COATED ORAL at 17:33

## 2018-06-22 RX ADMIN — DOCUSATE SODIUM 100 MG: 100 CAPSULE, LIQUID FILLED ORAL at 07:40

## 2018-06-22 RX ADMIN — TRAMADOL HYDROCHLORIDE 50 MG: 50 TABLET, FILM COATED ORAL at 23:32

## 2018-06-22 RX ADMIN — TRAMADOL HYDROCHLORIDE 50 MG: 50 TABLET, FILM COATED ORAL at 00:20

## 2018-06-22 RX ADMIN — INSULIN LISPRO 1 UNITS: 100 INJECTION, SOLUTION INTRAVENOUS; SUBCUTANEOUS at 17:33

## 2018-06-22 RX ADMIN — Medication 325 MG: at 16:38

## 2018-06-22 RX ADMIN — INSULIN LISPRO 1 UNITS: 100 INJECTION, SOLUTION INTRAVENOUS; SUBCUTANEOUS at 11:43

## 2018-06-22 RX ADMIN — ONDANSETRON 4 MG: 2 INJECTION, SOLUTION INTRAMUSCULAR; INTRAVENOUS at 04:18

## 2018-06-22 RX ADMIN — OXYCODONE HYDROCHLORIDE 10 MG: 10 TABLET ORAL at 21:47

## 2018-06-22 RX ADMIN — TRAMADOL HYDROCHLORIDE 50 MG: 50 TABLET, FILM COATED ORAL at 17:33

## 2018-06-22 RX ADMIN — AMLODIPINE BESYLATE 10 MG: 10 TABLET ORAL at 07:41

## 2018-06-22 RX ADMIN — OXYCODONE HYDROCHLORIDE 10 MG: 10 TABLET ORAL at 07:41

## 2018-06-22 RX ADMIN — OXYCODONE HYDROCHLORIDE AND ACETAMINOPHEN 500 MG: 500 TABLET ORAL at 17:33

## 2018-06-22 RX ADMIN — ENOXAPARIN SODIUM 40 MG: 100 INJECTION SUBCUTANEOUS at 07:41

## 2018-06-22 RX ADMIN — OXYCODONE HYDROCHLORIDE AND ACETAMINOPHEN 500 MG: 500 TABLET ORAL at 07:40

## 2018-06-22 RX ADMIN — SODIUM CHLORIDE 100 ML/HR: 0.9 INJECTION, SOLUTION INTRAVENOUS at 22:17

## 2018-06-22 RX ADMIN — GABAPENTIN 100 MG: 100 CAPSULE ORAL at 07:41

## 2018-06-22 RX ADMIN — INSULIN LISPRO 1 UNITS: 100 INJECTION, SOLUTION INTRAVENOUS; SUBCUTANEOUS at 07:42

## 2018-06-22 RX ADMIN — ALLOPURINOL 100 MG: 100 TABLET ORAL at 17:33

## 2018-06-22 RX ADMIN — METHOCARBAMOL 750 MG: 750 TABLET, FILM COATED ORAL at 15:42

## 2018-06-22 RX ADMIN — TRAMADOL HYDROCHLORIDE 50 MG: 50 TABLET, FILM COATED ORAL at 05:17

## 2018-06-22 RX ADMIN — PRAVASTATIN SODIUM 40 MG: 40 TABLET ORAL at 16:38

## 2018-06-22 RX ADMIN — FUROSEMIDE 40 MG: 40 TABLET ORAL at 07:41

## 2018-06-22 RX ADMIN — CARVEDILOL 25 MG: 25 TABLET, FILM COATED ORAL at 16:38

## 2018-06-22 RX ADMIN — ONDANSETRON 4 MG: 2 INJECTION, SOLUTION INTRAMUSCULAR; INTRAVENOUS at 22:17

## 2018-06-22 RX ADMIN — SODIUM CHLORIDE 100 ML/HR: 0.9 INJECTION, SOLUTION INTRAVENOUS at 09:44

## 2018-06-22 RX ADMIN — OXYCODONE HYDROCHLORIDE 10 MG: 10 TABLET ORAL at 15:42

## 2018-06-22 RX ADMIN — GABAPENTIN 100 MG: 100 CAPSULE ORAL at 16:38

## 2018-06-22 RX ADMIN — OXYCODONE HYDROCHLORIDE 10 MG: 10 TABLET ORAL at 03:44

## 2018-06-22 RX ADMIN — OLMESARTAN MEDOXOMIL 40 MG: 20 TABLET, FILM COATED ORAL at 07:41

## 2018-06-22 NOTE — PLAN OF CARE
Problem: PHYSICAL THERAPY ADULT  Goal: Performs mobility at highest level of function for planned discharge setting  See evaluation for individualized goals  Treatment/Interventions: Functional transfer training, LE strengthening/ROM, Elevations, Therapeutic exercise, Endurance training, Bed mobility, Gait training, Spoke to nursing, Family  Equipment Recommended: Ovi Rangel       See flowsheet documentation for full assessment, interventions and recommendations  Outcome: Adequate for Discharge  Prognosis: Good  Problem List: Decreased strength, Decreased range of motion, Decreased endurance, Impaired balance, Decreased mobility, Pain  Assessment: Pt able to ambulate 180ft with standing rest breaks due to UE fatigue  Pt is ambulating beyond household and community distances as stated in his STG's  Pt has good safety awareness  Pt had semonstrated the necessary mobility to safely return home when medically stable  Barriers to Discharge: None     Recommendation: Outpatient PT     PT - OK to Discharge: Yes    See flowsheet documentation for full assessment

## 2018-06-22 NOTE — PHYSICAL THERAPY NOTE
Physical Therapy Progress Note     06/22/18 1415   Pain Assessment   Pain Assessment 0-10   Pain Score 4   Pain Type Acute pain;Surgical pain   Pain Location Knee   Pain Orientation Left   Pain Descriptors Aching;Discomfort   Pain Frequency Intermittent   Pain Onset Ongoing   Clinical Progression Gradually improving   Hospital Pain Intervention(s) Cold applied;Repositioned; Ambulation/increased activity; Elevated; Emotional support   Restrictions/Precautions   Weight Bearing Precautions Per Order Yes   RUE Weight Bearing Per Order WBAT   LUE Weight Bearing Per Order WBAT   RLE Weight Bearing Per Order WBAT   LLE Weight Bearing Per Order WBAT   Other Precautions WBS; Telemetry;Multiple lines; Fall Risk;Pain   General   Chart Reviewed Yes   Response to Previous Treatment Patient with no complaints from previous session  Family/Caregiver Present No   Subjective   Subjective Pt agreeable to PT  Bed Mobility   Additional Comments Pt OOB in chair upon entry  Transfers   Sit to Stand 5  Supervision   Additional items Increased time required;Armrests   Stand to Sit 4  Minimal assistance   Additional items Increased time required;Armrests   Stand pivot 5  Supervision   Additional items Increased time required   Additional Comments Pt requesting to return to chair, ice applied to LLE, foot pumps on, all needs within reach  Ambulation/Elevation   Gait pattern Improper Weight shift; Antalgic; Forward Flexion;Decreased foot clearance;Decreased L stance; Inconsistent penelope; Step to;Excessively slow   Gait Assistance 5  Supervision   Additional items Verbal cues   Assistive Device Rolling walker   Distance 180ft   Balance   Static Sitting Good   Dynamic Sitting Fair +   Static Standing Fair +   Dynamic Standing Fair   Ambulatory Fair -   Endurance Deficit   Endurance Deficit Yes   Endurance Deficit Description Pain, fatigued UE      Activity Tolerance   Activity Tolerance Patient tolerated treatment well;Patient limited by fatigue;Patient limited by pain   Nurse [address] , RN  Assessment   Prognosis Good   Problem List Decreased strength;Decreased range of motion;Decreased endurance; Impaired balance;Decreased mobility;Pain   Assessment Pt able to ambulate 180ft with standing rest breaks due to UE fatigue  Pt is ambulating beyond household and community distances as stated in his STG's  Pt has good safety awareness  Pt had semonstrated the necessary mobility to safely return home when medically stable  Barriers to Discharge None   Goals   Patient Goals to go home   STG Expiration Date 06/30/18   Short Term Goal #1 as per PT assessment: 7-10 days  Pt will amb 150 ft w/ rw, mod (I) in order to facilitate safe return to premorbid environment and community amb status  Pt will negotiate 4 steps --> 12 steps w/ hand rail and SPC, mod (I) in order to navigate in and out of the house and between levels of home environment safety  Pt will achieve (I) level w/ bed mob in order to facilitate safety with OOB and back to bed transitions in own living environment  Pt will perform transfers w/ mod (I) to assure (I) and safety w/ functional mobility/transitions w/ all aspects of mobility/locomotion  Pt will participate in LE therex and balance activities to max progression w/ mobility skills  Treatment Day 2   Plan   Treatment/Interventions Functional transfer training;LE strengthening/ROM; Elevations; Therapeutic exercise; Endurance training;Gait training   Progress Improving as expected   PT Frequency Twice a day   Recommendation   Recommendation Outpatient PT   Equipment Recommended Walker   PT - OK to Discharge Yes   Additional Comments when medically appropriate     Sarah T Nevils, PTA

## 2018-06-22 NOTE — PROGRESS NOTES
Internal Medicine Progress Note  Patient: Bubba Langston  Age/sex: 63 y o  male  Medical Record #: [id number]      ASSESSMENT/PLAN:  Bubba Langston is seen and examined and mangement for following issues:       # L TKR:  Pain management per ortho, c/o pain this a m ; monitor for constipation w/use of narcotics for pain; cont lovenox for DVT prophylaxis     # HTN:  BP is stable this a m        # DM II: cont metformin and sliding scale; monitor BS and titrate based on trend     # HLD: cont statin therapy;      # Anemia: hgb w/mild drop as expected post op; cont ferrous sulfate; cbc in a m ; pt asymptomatic    # Elevated creatinine:  Would give NS at 100cc/hr; hold dc today and repeat bmp in a m ; d/c lasix/benicar    # Leukocytosis: likely reactive; monitor fever         Subjective: Patient seen and examined   New or overnight issues include no significant overnight events or reported nursing issues; pt c/o significant pain in knee this a m     ROS:   GI: denies abdominal pain, change bowel habits or reflux symptoms  Neuro: No new neurologic changes  Respiratory: No Cough, SOB  Cardiovascular: No CP, palpitations   Musculoskeletal: + knee pain    Scheduled Meds:    Current Facility-Administered Medications:  allopurinol 100 mg Oral BID Song Aguilera   amLODIPine 10 mg Oral Daily EDWIN Chauhan   ascorbic acid 500 mg Oral BID Jasen Lemme   aspirin 81 mg Oral Every Other Day Jasen Lemme   carvedilol 25 mg Oral BID With Meals Jasen Lemme   docusate sodium 100 mg Oral BID Song Aguilera   enoxaparin 40 mg Subcutaneous Daily Jasen Lemme   ferrous sulfate 325 mg Oral BID With Meals Jasen Lemme   folic acid 195 mcg Oral Daily Song Aguilera   furosemide 40 mg Oral Daily EDWIN Chauhan   gabapentin 100 mg Oral TID Darci Dickinson MD   HYDROmorphone 1 mg Intravenous Q4H PRN Jasen Estrella   insulin lispro 1-5 Units Subcutaneous TID FRANCE Amin DO   metFORMIN 500 mg Oral Daily Song Aguilera   methocarbamol 750 mg Oral Q6H PRN Chayo Mendez MD   metoclopramide 10 mg Intravenous Q6H PRN Bubba Aguilera   olmesartan 40 mg Oral Daily EDWIN Chauhan   ondansetron 4 mg Intravenous Q4H PRN Larkin Severance   oxyCODONE 10 mg Oral Q4H PRN Yuvalkin Severance   oxyCODONE 5 mg Oral Q4H PRN Larkin Severance   pravastatin 40 mg Oral Daily With [employer] Aguilera   senna 1 tablet Oral Daily Yuvalkin Severance   traMADol 50 mg Oral Q6H [address] Song Aguilera       Labs:       Results from last 7 days  Lab Units 06/22/18  0430 06/21/18  0447   WBC Thousand/uL 10 26* 9 49   HEMOGLOBIN g/dL 9 4* 10 3*   HEMATOCRIT % 28 4* 31 3*   PLATELETS Thousands/uL 133* 144*       Results from last 7 days  Lab Units 06/22/18  0430 06/21/18  0447   SODIUM mmol/L 130* 137   POTASSIUM mmol/L 3 7 4 4   CHLORIDE mmol/L 95* 102   CO2 mmol/L 27 27   BUN mg/dL 23 15   CREATININE mg/dL 2 11* 1 07   GLUCOSE RANDOM mg/dL 158* 184*   CALCIUM mg/dL 8 2* 8 7                  Glucose (mg/dL)   Date Value   06/22/2018 158 (H)   06/21/2018 184 (H)       Labs reviewed    Physical Examination:  Vitals:   Vitals:    06/21/18 1500 06/21/18 1900 06/21/18 2300 06/22/18 0724   BP: 148/76 153/92 134/79 124/69   BP Location: Right arm Right arm Right arm Right arm   Pulse: 77 81 88 91   Resp: 16 17 16 18   Temp: 97 9 °F (36 6 °C) 97 9 °F (36 6 °C) 98 1 °F (36 7 °C) 98 7 °F (37 1 °C)   TempSrc: Oral Oral Oral Oral   SpO2: 92% 93% 90% 94%   Weight:       Height:           GEN: NAD  RESP: CTAB, no R/R/W  CV: +S1 S2, regular rate, no rubs  ABD: soft, NT, ND, normal BS   : catheter removed; pt voided x1  EXT: DP pulses intact b/l; dressing intact to knee  Neuro: AAOx3    [x ] Total time spent: 30 Mins and greater than 50% of this time was spent counseling/coordinating care

## 2018-06-22 NOTE — POST OP PROGRESS NOTES
Orthopedics   [facility] 63 y o  male MRN: [id number]  Unit/Bed#: CW3 346-01      Subjective:  62 y o male post operative day 2 sp left total knee arthroplasty, no c/o numbness/tingling    Labs:    0  Lab Value Date/Time   HCT 28 4 (L) 06/22/2018 0430   HCT 31 3 (L) 06/21/2018 0447   HCT 36 9 05/17/2018 1230   HGB 9 4 (L) 06/22/2018 0430   HGB 10 3 (L) 06/21/2018 0447   HGB 12 8 05/17/2018 1230   INR 1 08 05/17/2018 1230   WBC 10 26 (H) 06/22/2018 0430   WBC 9 49 06/21/2018 0447   WBC 5 52 05/17/2018 1230       Meds:    Current Facility-Administered Medications:     allopurinol (ZYLOPRIM) tablet 100 mg, 100 mg, Oral, BID, Milagros Samantha, 100 mg at 06/21/18 1724    amLODIPine (NORVASC) tablet 10 mg, 10 mg, Oral, Daily, Bernardo Sous, CRNP, 10 mg at 06/21/18 3517    ascorbic acid (VITAMIN C) tablet 500 mg, 500 mg, Oral, BID, Milagros Samantha, 500 mg at 06/21/18 1724    aspirin (ECOTRIN LOW STRENGTH) EC tablet 81 mg, 81 mg, Oral, Every Other Day, Milagros Samantha    carvedilol (COREG) tablet 25 mg, 25 mg, Oral, BID With Meals, Milagros Liverpool, 25 mg at 06/21/18 1617    docusate sodium (COLACE) capsule 100 mg, 100 mg, Oral, BID, Milagros [place], 100 mg at 06/21/18 1725    enoxaparin (LOVENOX) subcutaneous injection 40 mg, 40 mg, Subcutaneous, Daily, Milagros [place], 40 mg at 06/21/18 2514    ferrous sulfate tablet 325 mg, 325 mg, Oral, BID With Meals, Milagros Liverpool, 325 mg at 10/61/21 5952    folic acid (FOLVITE) tablet 400 mcg, 400 mcg, Oral, Daily, Milagros Samantha, 400 mcg at 06/21/18 0423    furosemide (LASIX) tablet 40 mg, 40 mg, Oral, Daily, Bernardo Sous, CRNP, 40 mg at 06/21/18 0816    gabapentin (NEURONTIN) capsule 100 mg, 100 mg, Oral, TID, Sebastien Johnston MD, 100 mg at 06/21/18 2229    HYDROmorphone (DILAUDID) injection 1 mg, 1 mg, Intravenous, Q4H PRN, Milagros Singh, 1 mg at 06/21/18 1445    insulin lispro (HumaLOG) 100 units/mL subcutaneous injection 1-5 Units, 1-5 Units, Subcutaneous, TID AC, 1 Units at 06/21/18 1726 **AND** Fingerstick Glucose (POCT), , , TID AC, Linda Washington DO    metFORMIN (GLUCOPHAGE) tablet 500 mg, 500 mg, Oral, Daily, Yuval Severance, 500 mg at 06/21/18 1617    methocarbamol (ROBAXIN) tablet 750 mg, 750 mg, Oral, Q6H PRN, Chayo Mendez MD, 750 mg at 06/21/18 1947    metoclopramide (REGLAN) injection 10 mg, 10 mg, Intravenous, Q6H PRN, Yuval Severance, 10 mg at 06/21/18 2229    olmesartan (BENICAR) tablet 40 mg, 40 mg, Oral, Daily, Angélica Perez, SENNP, 40 mg at 06/21/18 1012    ondansetron (ZOFRAN) injection 4 mg, 4 mg, Intravenous, Q4H PRN, Yuval Severance, 4 mg at 06/22/18 0418    oxyCODONE (ROXICODONE) immediate release tablet 10 mg, 10 mg, Oral, Q4H PRN, Yuval Severance, 10 mg at 06/22/18 0344    oxyCODONE (ROXICODONE) IR tablet 5 mg, 5 mg, Oral, Q4H PRN, Yuval Severance    pravastatin (PRAVACHOL) tablet 40 mg, 40 mg, Oral, Daily With Therman Jeannette, 40 mg at 06/21/18 1617    senna (SENOKOT) tablet 8 6 mg, 1 tablet, Oral, Daily, Yuval Severance, 8 6 mg at 06/21/18 6617    traMADol (ULTRAM) tablet 50 mg, 50 mg, Oral, Q6H [address], Yuval Severance, 50 mg at 06/22/18 9437    Blood Culture:   No results found for: BLOODCX    Wound Culture:   No results found for: WOUNDCULT    Ins and Outs:  I/O last 24 hours: In: 2451 6 [P O :1270; I V :1181 6]  Out: 1026 [Urine:726; Drains:300]          Physical:  Vitals:    06/21/18 2300   BP: 134/79   Pulse: 88   Resp: 16   Temp: 98 1 °F (36 7 °C)   SpO2: 90%     left lower extremity:  · Dressings C/D/I, wound clean/dry/well approximated  · Toes warm and well perfused  · + ankle df/pf, ehl/fhl motor functions  · Sensation intact sp/dp distribution      _*_*_*_*_*_*_*_*_*_*_*_*_*_*_*_*_*_*_*_*_*_*_*_*_*_*_*_*_*_*_*_*_*_*_*_*_*_*_*_*_*    Assessment: 62 y o male post operative day 2 left total knee arthroplasty   Doing well    Plan:  · Weight Bearing as tolerated LLE  · Up and out of bed  · DVT prophylaxis - lovenox, mechanical  · Acute pain service for all pain medicaiton management  · Analgesics  · PT/OT   · Dispo: ok for discharge from ortho standpoint    Meghan Valverde MD   06/22/18

## 2018-06-22 NOTE — DISCHARGE SUMMARY
ORTHOPEDICS DISCHARGE SUMMARY  Hans Pink 63 y o  male MRN: [id number]  Unit/Bed#: CW3 346-01    Attending Physician: Carolyn Canada    Admitting diagnosis: Arthritis of knee [M17 10]    Discharge diagnosis: Arthritis of knee [M17 10]    Date of admission: 6/20/2018    Date of discharge: 06/23/2018         Procedure: Left TKA    HPI:  This is a 63y o  year old male that presented to the office with signs and symptoms of left knee osteoarthritis  They tried and failed conservative treatment measures and wished to proceed with surgical intervention  The risks, benefits, and complications of the procedure were discussed with the patient and informed consent was obtained  Hospital Course: The patient was admitted to the hospital on 6/20/2018 and underwent an uncomplicated left total knee arthroplasty  They were transferred to the floor after a brief stay in the post-anesthesia care unit  Their pain was well managed with IV and oral pain medications  They began therapy on post operative day #1  Transient hyponatremia and BAKARI were treated post-operatively with fluid resuscitation  Lovenox was also started for DVT prophylaxis post operative day #1  Hemovac drain was removed on POD1  On discharge date pt was cleared by PT and the medicine team and determined to be safe for discharge  Daily discussion was had with the patient, nursing staff, orthopaedic team, and family members if present  All questions were answered to the patients satisifaction  0  Lab Value Date/Time   HGB 9 4 (L) 06/22/2018 0430   HGB 10 3 (L) 06/21/2018 0447   HGB 12 8 05/17/2018 1230     Greater than 2 gram drop which qualifies for diagnosis of acute blood loss anemia  Vital signs remained stable and pt was resuscitated with IVF as needed     Discharge Instructions: The patient was discharged weight bearing as tolerated to the left lower extremity  Lovenox will be continued for 28 days  Continue PT/OT   Take pain medications as instructed  Discharge Medications: For the complete list of discharge medications, please refer to the patient's medication reconciliation

## 2018-06-22 NOTE — OCCUPATIONAL THERAPY NOTE
OccupationalTherapy Progress Note     Patient Name: Johnny Herrerae  [id number] Date: 6/22/2018  Problem List  Patient Active Problem List   Diagnosis    Left knee pain    Primary osteoarthritis of left knee    Effusion of left knee    Arthritis of knee           06/22/18 0945   Restrictions/Precautions   Weight Bearing Precautions Per Order Yes   RUE Weight Bearing Per Order WBAT   LUE Weight Bearing Per Order WBAT   RLE Weight Bearing Per Order WBAT   LLE Weight Bearing Per Order WBAT   Other Precautions WBS;Pain; Fall Risk   Pain Assessment   Pain Assessment 0-10   Pain Score 8   Pain Type Acute pain   Pain Location Knee   Pain Orientation Left   Hospital Pain Intervention(s) Repositioned; Ambulation/increased activity; Emotional support  (RECENTLY TOOK PAIN MEDICATION PER PT )   ADL   Eating Assistance 7  Independent   Grooming Assistance 7  Independent   UB Bathing Assistance 7  Independent   LB Bathing Assistance 4  Minimal Assistance   UB Dressing Assistance 7  Independent   LB Dressing Assistance 4  Minimal Assistance   Bed Mobility   Supine to Sit 4  Minimal assistance   Sit to Supine 4  Minimal assistance   Transfers   Sit to Stand 5  Supervision   Stand to Sit 5  Supervision   Stand pivot 5  Supervision   Toilet transfer 5  Supervision   Functional Mobility   Functional Mobility 5  Supervision   Additional items Rolling walker   Cognition   Overall Cognitive Status WFL   Activity Tolerance   Activity Tolerance Patient limited by fatigue;Patient limited by pain   Assessment   Assessment PT SEEN FOR OT SESSION FOCUSING ON ADLS/SELF CARE TASKS - I WITH UB ADLS AFTER SETUP, REQUIRED MIN A TO WASH FEET AND START PANTS OVER FEET OTHERWISE ABLE TO COMPLETE ALL TASKS AT MOD I/SBA LEVEL - REPORTS HIS WIFE IS SUPPORTIVE AND WILL BE ABLE TO PROVIDE ASSIST PRN POST D/C FOR LB ADLS - ENOURAGED PT TO ATTEMPT ADLS HIMSELF RATHER THAN RELY SOLEY ON WIFE POST D/C - PT AGREEABLE - REVIEWED SAFE USE OF RW AND TRANSFER TECHNIQUES - PT WITH GOOD UNDERSTANDING - LIMITED ONLY BY PAIN - OK FOR D/C HOME WITH FAMILY SUPPORT WHEN MEDICALLY CLEARED- D/C FROM [facility]    Plan   Treatment Interventions ADL retraining;Functional transfer training;Patient/family training;Equipment evaluation/education; Compensatory technique education; Activityengagement   Treatment Day 1   OT Frequency (D/C OT )   Recommendation   OT Discharge Recommendation Home with family support   OT - OK to Discharge Yes   Barthel Index   Feeding 10   Bathing 0   Grooming Score 5   Dressing Score 5   Bladder Score 10   Bowels Score 10   Toilet Use Score 5   Transfers (Bed/Chair) Score 10   Mobility (Level Surface) Score 0   Stairs Score 5   Barthel Index Score 60     Emblem, [place]

## 2018-06-22 NOTE — DISCHARGE INSTRUCTIONS
Discharge Instructions - Orthopedics  Michael Foster 63 y o  male MRN: [id number]  Unit/Bed#: PACU 02    Weight Bearing Status:                                           Weight Bearing as tolerated to the left lower extremity  DVT prophylaxis:  Complete course of Lovenox as directed    Pain:  Continue analgesics as directed    Showering Instructions:   Do not shower until POD #3    Dressing Instructions:   Keep dressing clean, dry and intact until follow up appointment  Driving Instructions:  No driving until cleared by Orthopaedic Surgery  PT/OT:  Continue PT/OT on outpatient basis as directed    Appt Instructions:    If you do not have your appointment, please call the clinic at [phone] to follow up with Dr Karen Rhodes as scheduled or in 1 week

## 2018-06-22 NOTE — PHYSICAL THERAPY NOTE
Physical Therapy Progress Note     06/22/18 1115   Pain Assessment   Pain Assessment 0-10   Pain Score 6   Pain Type Acute pain;Surgical pain   Pain Location Knee   Pain Orientation Left   Pain Descriptors Aching;Discomfort   Pain Frequency Intermittent   Pain Onset Ongoing   Clinical Progression Gradually improving   Hospital Pain Intervention(s) Cold applied;Repositioned; Ambulation/increased activity; Elevated; Emotional support   Restrictions/Precautions   Weight Bearing Precautions Per Order Yes   RUE Weight Bearing Per Order WBAT   LUE Weight Bearing Per Order WBAT   RLE Weight Bearing Per Order WBAT   Other Precautions WBS; Fall Risk;Pain;Multiple lines;Telemetry   General   Chart Reviewed Yes   Response to Previous Treatment Patient with no complaints from previous session  Family/Caregiver Present No   Subjective   Subjective Pt agreeable to PT  Bed Mobility   Additional Comments Pt OOB in chair upon entry  Transfers   Sit to Stand 5  Supervision   Additional items Increased time required   Stand to Sit 5  Supervision   Additional items Increased time required;Verbal cues   Stand pivot 5  Supervision   Additional items Increased time required   Toilet transfer 5  Supervision   Additional items Increased time required;Standard toilet   Ambulation/Elevation   Gait pattern Improper Weight shift; Antalgic; Forward Flexion;Decreased foot clearance;Decreased L stance; Inconsistent penelope; Foward flexed; Excessively slow; Step to   Gait Assistance 5  Supervision   Additional items Verbal cues   Assistive Device Rolling walker   Distance 90ft x 2   Stair Management Assistance 5  Supervision   Additional items Verbal cues   Stair Management Technique Two rails; Step to pattern; Foreward;Nonreciprocal   Number of Stairs 10   Balance   Static Sitting Good   Dynamic Sitting Fair +   Static Standing Fair +   Dynamic Standing Fair -   Ambulatory Fair -   Endurance Deficit   Endurance Deficit Yes   Endurance Deficit Description Pain, fatigued UE  Activity Tolerance   Activity Tolerance Patient limited by pain; Patient limited by fatigue   Nurse [address], RN  Exercises   Quad Sets Sitting;10 reps; Left   Heelslides Sitting;10 reps;AAROM; Left   Hip Flexion Sitting;10 reps;AAROM; Left   Assessment   Prognosis Good   Problem List Decreased strength;Decreased range of motion;Decreased endurance; Impaired balance;Decreased mobility;Pain   Assessment Pt able to tolerate household ambulation distances  Pt demonstrates fatigue and required several standing rest breaks  Pt able to recall stair negotion instructions and perform independently  Pt continues to have impaired mobility comaped to baseline and could benefit from continued PT to facilitate increased ambulation distances  Barriers to Discharge None   Goals   Patient Goals to go home   STG Expiration Date 06/30/18   Short Term Goal #1 as per PT assessment: 7-10 days  Pt will amb 150 ft w/ rw, mod (I) in order to facilitate safe return to premorbid environment and community amb status  Pt will negotiate 4 steps --> 12 steps w/ hand rail and SPC, mod (I) in order to navigate in and out of the house and between levels of home environment safety  Pt will achieve (I) level w/ bed mob in order to facilitate safety with OOB and back to bed transitions in own living environment  Pt will perform transfers w/ mod (I) to assure (I) and safety w/ functional mobility/transitions w/ all aspects of mobility/locomotion  Pt will participate in LE therex and balance activities to max progression w/ mobility skills  Treatment Day 1   Plan   Treatment/Interventions Functional transfer training;LE strengthening/ROM; Elevations; Therapeutic exercise; Endurance training;Gait training   Progress Progressing toward goals   PT Frequency Twice a day   Recommendation   Recommendation Outpatient PT   Equipment Recommended Walker   PT - OK to Discharge Yes   Additional Comments when medically appropriate      [place], PTA

## 2018-06-22 NOTE — PLAN OF CARE
Problem: PHYSICAL THERAPY ADULT  Goal: Performs mobility at highest level of function for planned discharge setting  See evaluation for individualized goals  Treatment/Interventions: Functional transfer training, LE strengthening/ROM, Elevations, Therapeutic exercise, Endurance training, Bed mobility, Gait training, Spoke to nursing, Family  Equipment Recommended: Ángel Hayes       See flowsheet documentation for full assessment, interventions and recommendations  Outcome: Adequate for Discharge  Prognosis: Good  Problem List: Decreased strength, Decreased range of motion, Decreased endurance, Impaired balance, Decreased mobility, Pain  Assessment: Pt able to ambulate 180ft with standing rest breaks due to UE fatigue  Pt is ambulating beyond household and community distances as stated in his STG's  Pt has good safety awareness  Pt had semonstrated the necessary mobility to safely return home when medically stable  Barriers to Discharge: None     Recommendation: Outpatient PT     PT - OK to Discharge: Yes    See flowsheet documentation for full assessment

## 2018-06-23 LAB
ANION GAP SERPL CALCULATED.3IONS-SCNC: 9 MMOL/L (ref 4–13)
BASOPHILS # BLD AUTO: 0.02 THOUSANDS/ΜL (ref 0–0.1)
BASOPHILS NFR BLD AUTO: 0 % (ref 0–1)
BUN SERPL-MCNC: 29 MG/DL (ref 5–25)
CALCIUM SERPL-MCNC: 8.7 MG/DL (ref 8.3–10.1)
CHLORIDE SERPL-SCNC: 97 MMOL/L (ref 100–108)
CO2 SERPL-SCNC: 27 MMOL/L (ref 21–32)
CREAT SERPL-MCNC: 1.87 MG/DL (ref 0.6–1.3)
EOSINOPHIL # BLD AUTO: 0.15 THOUSAND/ΜL (ref 0–0.61)
EOSINOPHIL NFR BLD AUTO: 2 % (ref 0–6)
ERYTHROCYTE [DISTWIDTH] IN BLOOD BY AUTOMATED COUNT: 11.8 % (ref 11.6–15.1)
GFR SERPL CREATININE-BSD FRML MDRD: 44 ML/MIN/1.73SQ M
GLUCOSE SERPL-MCNC: 158 MG/DL (ref 65–140)
GLUCOSE SERPL-MCNC: 161 MG/DL (ref 65–140)
GLUCOSE SERPL-MCNC: 185 MG/DL (ref 65–140)
GLUCOSE SERPL-MCNC: 192 MG/DL (ref 65–140)
HCT VFR BLD AUTO: 25.1 % (ref 36.5–49.3)
HGB BLD-MCNC: 8.4 G/DL (ref 12–17)
IMM GRANULOCYTES # BLD AUTO: 0.03 THOUSAND/UL (ref 0–0.2)
IMM GRANULOCYTES NFR BLD AUTO: 0 % (ref 0–2)
LYMPHOCYTES # BLD AUTO: 1.74 THOUSANDS/ΜL (ref 0.6–4.47)
LYMPHOCYTES NFR BLD AUTO: 25 % (ref 14–44)
MCH RBC QN AUTO: 32.1 PG (ref 26.8–34.3)
MCHC RBC AUTO-ENTMCNC: 33.5 G/DL (ref 31.4–37.4)
MCV RBC AUTO: 96 FL (ref 82–98)
MONOCYTES # BLD AUTO: 0.87 THOUSAND/ΜL (ref 0.17–1.22)
MONOCYTES NFR BLD AUTO: 12 % (ref 4–12)
NEUTROPHILS # BLD AUTO: 4.3 THOUSANDS/ΜL (ref 1.85–7.62)
NEUTS SEG NFR BLD AUTO: 61 % (ref 43–75)
NRBC BLD AUTO-RTO: 0 /100 WBCS
PLATELET # BLD AUTO: 109 THOUSANDS/UL (ref 149–390)
PMV BLD AUTO: 10.7 FL (ref 8.9–12.7)
POTASSIUM SERPL-SCNC: 3.8 MMOL/L (ref 3.5–5.3)
RBC # BLD AUTO: 2.62 MILLION/UL (ref 3.88–5.62)
SODIUM SERPL-SCNC: 133 MMOL/L (ref 136–145)
WBC # BLD AUTO: 7.11 THOUSAND/UL (ref 4.31–10.16)

## 2018-06-23 PROCEDURE — 85025 COMPLETE CBC W/AUTO DIFF WBC: CPT | Performed by: ORTHOPAEDIC SURGERY

## 2018-06-23 PROCEDURE — 80048 BASIC METABOLIC PNL TOTAL CA: CPT | Performed by: ORTHOPAEDIC SURGERY

## 2018-06-23 PROCEDURE — 97530 THERAPEUTIC ACTIVITIES: CPT

## 2018-06-23 PROCEDURE — 97110 THERAPEUTIC EXERCISES: CPT

## 2018-06-23 PROCEDURE — 99024 POSTOP FOLLOW-UP VISIT: CPT | Performed by: ORTHOPAEDIC SURGERY

## 2018-06-23 PROCEDURE — 97116 GAIT TRAINING THERAPY: CPT

## 2018-06-23 PROCEDURE — 82948 REAGENT STRIP/BLOOD GLUCOSE: CPT

## 2018-06-23 RX ORDER — SODIUM CHLORIDE 9 MG/ML
75 INJECTION, SOLUTION INTRAVENOUS CONTINUOUS
Status: DISCONTINUED | OUTPATIENT
Start: 2018-06-23 | End: 2018-06-24

## 2018-06-23 RX ADMIN — TRAMADOL HYDROCHLORIDE 50 MG: 50 TABLET, FILM COATED ORAL at 05:28

## 2018-06-23 RX ADMIN — OXYCODONE HYDROCHLORIDE 10 MG: 10 TABLET ORAL at 07:49

## 2018-06-23 RX ADMIN — GABAPENTIN 100 MG: 100 CAPSULE ORAL at 20:37

## 2018-06-23 RX ADMIN — GABAPENTIN 100 MG: 100 CAPSULE ORAL at 08:05

## 2018-06-23 RX ADMIN — ALLOPURINOL 100 MG: 100 TABLET ORAL at 17:33

## 2018-06-23 RX ADMIN — GABAPENTIN 100 MG: 100 CAPSULE ORAL at 17:33

## 2018-06-23 RX ADMIN — OXYCODONE HYDROCHLORIDE 10 MG: 10 TABLET ORAL at 14:24

## 2018-06-23 RX ADMIN — Medication 400 MCG: at 08:06

## 2018-06-23 RX ADMIN — Medication 325 MG: at 17:33

## 2018-06-23 RX ADMIN — OXYCODONE HYDROCHLORIDE 10 MG: 10 TABLET ORAL at 03:18

## 2018-06-23 RX ADMIN — CARVEDILOL 25 MG: 25 TABLET, FILM COATED ORAL at 17:36

## 2018-06-23 RX ADMIN — SENNOSIDES 8.6 MG: 8.6 TABLET, FILM COATED ORAL at 08:05

## 2018-06-23 RX ADMIN — SODIUM CHLORIDE 75 ML/HR: 0.9 INJECTION, SOLUTION INTRAVENOUS at 10:03

## 2018-06-23 RX ADMIN — OXYCODONE HYDROCHLORIDE AND ACETAMINOPHEN 500 MG: 500 TABLET ORAL at 08:05

## 2018-06-23 RX ADMIN — TRAMADOL HYDROCHLORIDE 50 MG: 50 TABLET, FILM COATED ORAL at 11:43

## 2018-06-23 RX ADMIN — TRAMADOL HYDROCHLORIDE 50 MG: 50 TABLET, FILM COATED ORAL at 17:33

## 2018-06-23 RX ADMIN — ENOXAPARIN SODIUM 40 MG: 100 INJECTION SUBCUTANEOUS at 08:05

## 2018-06-23 RX ADMIN — TRAMADOL HYDROCHLORIDE 50 MG: 50 TABLET, FILM COATED ORAL at 23:40

## 2018-06-23 RX ADMIN — OXYCODONE HYDROCHLORIDE AND ACETAMINOPHEN 500 MG: 500 TABLET ORAL at 17:33

## 2018-06-23 RX ADMIN — ALLOPURINOL 100 MG: 100 TABLET ORAL at 08:05

## 2018-06-23 RX ADMIN — DOCUSATE SODIUM 100 MG: 100 CAPSULE, LIQUID FILLED ORAL at 17:33

## 2018-06-23 RX ADMIN — AMLODIPINE BESYLATE 10 MG: 10 TABLET ORAL at 08:05

## 2018-06-23 RX ADMIN — METFORMIN HYDROCHLORIDE 500 MG: 500 TABLET, FILM COATED ORAL at 17:33

## 2018-06-23 RX ADMIN — INSULIN LISPRO 1 UNITS: 100 INJECTION, SOLUTION INTRAVENOUS; SUBCUTANEOUS at 08:00

## 2018-06-23 RX ADMIN — INSULIN LISPRO 1 UNITS: 100 INJECTION, SOLUTION INTRAVENOUS; SUBCUTANEOUS at 17:32

## 2018-06-23 RX ADMIN — OXYCODONE HYDROCHLORIDE 10 MG: 10 TABLET ORAL at 20:45

## 2018-06-23 RX ADMIN — Medication 325 MG: at 08:05

## 2018-06-23 RX ADMIN — PRAVASTATIN SODIUM 40 MG: 40 TABLET ORAL at 17:33

## 2018-06-23 RX ADMIN — DOCUSATE SODIUM 100 MG: 100 CAPSULE, LIQUID FILLED ORAL at 08:05

## 2018-06-23 RX ADMIN — INSULIN LISPRO 1 UNITS: 100 INJECTION, SOLUTION INTRAVENOUS; SUBCUTANEOUS at 11:43

## 2018-06-23 RX ADMIN — CARVEDILOL 25 MG: 25 TABLET, FILM COATED ORAL at 08:05

## 2018-06-23 NOTE — PROGRESS NOTES
Internal Medicine Progress Note  Patient: Betzy Brenner  Age/sex: 63 y o  male  Medical Record #: [id number]      ASSESSMENT/PLAN:  Betzy Brenner is seen and examined and mangement for following issues:       # L TKR:  Pain management per ortho, c/o pain this a m ; monitor for constipation w/use of narcotics for pain; cont lovenox for DVT prophylaxis     # HTN:  BP is stable this a m        # DM II: cont metformin and sliding scale; monitor BS and titrate based on trend     # HLD: cont statin therapy;      # Anemia:  Hemoglobin 8 4 today likely dilutional due to IV fluids  No need for transfusion as patient was asymptomatic  # Elevated creatinine:  Creatinine 1 87  Will give another liter of NSS today  BMP in a m  # Leukocytosis: likely reactive; monitor fever         Subjective: Patient seen and examined  Patient reports pain control is improved  He is eating well  He has not yet had a bowel movement but denies any nausea or abdominal pain  He is passing gas  He denies any other complaints      ROS:   GI: denies abdominal pain, change bowel habits or reflux symptoms  Neuro: No new neurologic changes  Respiratory: No Cough, SOB  Cardiovascular: No CP, palpitations   Musculoskeletal: + knee pain    Scheduled Meds:    Current Facility-Administered Medications:  allopurinol 100 mg Oral BID Song Aguilera    amLODIPine 10 mg Oral Daily EDWIN Chauhan    ascorbic acid 500 mg Oral BID Melodye Repress    aspirin 81 mg Oral Every Other Day Melodye Repress    carvedilol 25 mg Oral BID With Meals Melodye Repress    docusate sodium 100 mg Oral BID Song Aguilera    enoxaparin 40 mg Subcutaneous Daily Melodye Repress    ferrous sulfate 325 mg Oral BID With Meals Melodye Repress    folic acid 262 mcg Oral Daily Song Aguilera    gabapentin 100 mg Oral TID Castillo Mackay MD    HYDROmorphone 1 mg Intravenous Q4H PRN Melodye Repress    insulin lispro 1-5 Units Subcutaneous TID FRANCE Villa DO    metFORMIN 500 mg Oral Daily Bella Simental    methocarbamol 750 mg Oral Q6H PRN Dung Rondon MD    metoclopramide 10 mg Intravenous Q6H PRN Song Aguilera    ondansetron 4 mg Intravenous Q4H PRN Bella Simental    oxyCODONE 10 mg Oral Q4H PRN Bella Simental    oxyCODONE 5 mg Oral Q4H PRN Bella Simental    pravastatin 40 mg Oral Daily With [employer]    senna 1 tablet Oral Daily Song Aguilera    sodium chloride 100 mL/hr Intravenous Continuous EDWIN Valenzuela Last Rate: 100 mL/hr (06/22/18 2217)   traMADol 50 mg Oral Q6H [facility] Song Aguilera        Labs:       Results from last 7 days  Lab Units 06/23/18  0512 06/22/18  0430   WBC Thousand/uL 7 11 10 26*   HEMOGLOBIN g/dL 8 4* 9 4*   HEMATOCRIT % 25 1* 28 4*   PLATELETS Thousands/uL 109* 133*       Results from last 7 days  Lab Units 06/23/18  0512 06/22/18  0430   SODIUM mmol/L 133* 130*   POTASSIUM mmol/L 3 8 3 7   CHLORIDE mmol/L 97* 95*   CO2 mmol/L 27 27   BUN mg/dL 29* 23   CREATININE mg/dL 1 87* 2 11*   GLUCOSE RANDOM mg/dL 158* 158*   CALCIUM mg/dL 8 7 8 2*                  Glucose (mg/dL)   Date Value   06/23/2018 158 (H)   06/22/2018 158 (H)   06/21/2018 184 (H)       Labs reviewed    Physical Examination:  Vitals:   Vitals:    06/22/18 1500 06/22/18 2147 06/22/18 2340 06/23/18 0757   BP: 115/59  99/51 136/63   BP Location: Right arm  Right arm    Pulse: 90  83 (!) 110   Resp: 18 14 18   Temp: 98 2 °F (36 8 °C)  98 2 °F (36 8 °C) 98 3 °F (36 8 °C)   TempSrc: Oral  Oral    SpO2: 90% 95% 93% 92%   Weight:       Height:           GEN: NAD  RESP: CTAB, no R/R/W  CV: +S1 S2, regular rate, no rubs  ABD: soft, NT, ND, normal BS   :  No Lara  EXT: DP pulses intact b/l; dressing intact to knee  Neuro: AAOx3    [ X ] Total time spent: 30 Mins and greater than 50% of this time was spent counseling/coordinating care

## 2018-06-23 NOTE — POST OP PROGRESS NOTES
Orthopedics   Kalina Mcnair 63 y o  male MRN: [id number]  Unit/Bed#: CW3 346-01      Subjective:  62 y o male post operative day 3 sp left total knee arthroplasty, no complaints    Labs:    0  Lab Value Date/Time   HCT 25 1 (L) 06/23/2018 0512   HCT 28 4 (L) 06/22/2018 0430   HCT 31 3 (L) 06/21/2018 0447   HGB 8 4 (L) 06/23/2018 0512   HGB 9 4 (L) 06/22/2018 0430   HGB 10 3 (L) 06/21/2018 0447   INR 1 08 05/17/2018 1230   WBC 7 11 06/23/2018 0512   WBC 10 26 (H) 06/22/2018 0430   WBC 9 49 06/21/2018 0447       Meds:    Current Facility-Administered Medications:     allopurinol (ZYLOPRIM) tablet 100 mg, 100 mg, Oral, BID, Carmelo Means, 100 mg at 06/22/18 1733    amLODIPine (NORVASC) tablet 10 mg, 10 mg, Oral, Daily, EDWIN Mckeon, 10 mg at 06/22/18 0741    ascorbic acid (VITAMIN C) tablet 500 mg, 500 mg, Oral, BID, Carmelo Means, 500 mg at 06/22/18 1733    aspirin (ECOTRIN LOW STRENGTH) EC tablet 81 mg, 81 mg, Oral, Every Other Day, Carmelo Means, 81 mg at 06/22/18 0740    carvedilol (COREG) tablet 25 mg, 25 mg, Oral, BID With Meals, Carmelo Means, 25 mg at 06/22/18 1638    docusate sodium (COLACE) capsule 100 mg, 100 mg, Oral, BID, Carmelo Means, 100 mg at 06/22/18 1733    enoxaparin (LOVENOX) subcutaneous injection 40 mg, 40 mg, Subcutaneous, Daily, Carmelo Means, 40 mg at 06/22/18 0962    ferrous sulfate tablet 325 mg, 325 mg, Oral, BID With Meals, Carmelo Means, 325 mg at 56/64/35 2655    folic acid (FOLVITE) tablet 400 mcg, 400 mcg, Oral, Daily, Carmelo Means, 400 mcg at 06/22/18 0741    gabapentin (NEURONTIN) capsule 100 mg, 100 mg, Oral, TID, Claribel Arshad MD, 100 mg at 06/22/18 2147    HYDROmorphone (DILAUDID) injection 1 mg, 1 mg, Intravenous, Q4H PRN, Carmelo Roberts, 1 mg at 06/21/18 1445    insulin lispro (HumaLOG) 100 units/mL subcutaneous injection 1-5 Units, 1-5 Units, Subcutaneous, TID AC, 1 Units at 06/22/18 1733 **AND** Fingerstick Glucose (POCT), , , TID AC, Sally Monk, DO   metFORMIN (GLUCOPHAGE) tablet 500 mg, 500 mg, Oral, Daily, Haskel Johan, 500 mg at 06/22/18 1733    methocarbamol (ROBAXIN) tablet 750 mg, 750 mg, Oral, Q6H PRN, Kierra Angulo MD, 750 mg at 06/22/18 2332    metoclopramide (REGLAN) injection 10 mg, 10 mg, Intravenous, Q6H PRN, Haskel Johan, 10 mg at 06/21/18 2229    ondansetron (ZOFRAN) injection 4 mg, 4 mg, Intravenous, Q4H PRN, Haskel Johan, 4 mg at 06/22/18 2217    oxyCODONE (ROXICODONE) immediate release tablet 10 mg, 10 mg, Oral, Q4H PRN, Haskel Johan, 10 mg at 06/23/18 0318    oxyCODONE (ROXICODONE) IR tablet 5 mg, 5 mg, Oral, Q4H PRN, Haskel Johan    pravastatin (PRAVACHOL) tablet 40 mg, 40 mg, Oral, Daily With Chloe Ta, 40 mg at 06/22/18 1638    senna (SENOKOT) tablet 8 6 mg, 1 tablet, Oral, Daily, Haskel Johan, 8 6 mg at 06/22/18 0741    sodium chloride 0 9 % infusion, 100 mL/hr, Intravenous, Continuous, EDWIN Danielle, Last Rate: 100 mL/hr at 06/22/18 2217, 100 mL/hr at 06/22/18 2217    traMADol (ULTRAM) tablet 50 mg, 50 mg, Oral, Q6H [address], Haskel Johan, 50 mg at 06/23/18 1454    Blood Culture:   No results found for: BLOODCX    Wound Culture:   No results found for: WOUNDCULT    Ins and Outs:  I/O last 24 hours: In: 80 [P O :780]  Out: -           Physical:  Vitals:    06/22/18 2340   BP: 99/51   Pulse: 83   Resp: 14   Temp: 98 2 °F (36 8 °C)   SpO2: 93%     left lower extremity:  · Dressings changed, C/D/I, wound clean/dry/well approximated  · Toes warm and well perfused  · + ankle df/pf, ehl/fhl motor functions  · Sensation intact sp/dp distribution      _*_*_*_*_*_*_*_*_*_*_*_*_*_*_*_*_*_*_*_*_*_*_*_*_*_*_*_*_*_*_*_*_*_*_*_*_*_*_*_*_*    Assessment: 62 y o male post operative day 3 left total knee arthroplasty   Doing well    Plan:  · Weight Bearing as tolerated LLE  · Up and out of bed  · DVT prophylaxis - lovenox, mechanical  · Acute pain service for all pain medicaiton management  · Analgesics  · PT/OT   · Dispo: ok for discharge from ortho standpoint    Sonia Spence MD   06/23/18

## 2018-06-23 NOTE — PHYSICAL THERAPY NOTE
Physical Therapy Progress Note     18 1005   Pain Assessment   Pain Assessment 0-10   Pain Score 8   Pain Type Acute pain;Surgical pain   Pain Location Knee   Pain Orientation Left   Restrictions/Precautions   Weight Bearing Precautions Per Order Yes   RUE Weight Bearing Per Order WBAT   LUE Weight Bearing Per Order WBAT   RLE Weight Bearing Per Order WBAT   LLE Weight Bearing Per Order WBAT   Other Precautions WBS; Telemetry;Multiple lines; Fall Risk;Pain   General   Chart Reviewed Yes   Response to Previous Treatment Patient with no complaints from previous session  Family/Caregiver Present No   Cognition   Overall Cognitive Status WFL   Arousal/Participation Alert   Attention Within functional limits   Orientation Level Oriented X4   Memory Within functional limits   Following Commands Follows multistep commands with increased time or repetition   Subjective   Subjective Pt was identified by name and   Pt also agreeable to PT  Bed Mobility   Supine to Sit 4  Minimal assistance   Additional items Assist x 1; Increased time required;LE management   Sit to Supine 4  Minimal assistance   Additional items Assist x 1; Increased time required;LE management   Transfers   Sit to Stand 5  Supervision   Additional items Increased time required   Stand to Sit 5  Supervision   Additional items Increased time required;Verbal cues   Stand pivot 5  Supervision   Additional items Increased time required;Verbal cues   Toilet transfer 5  Supervision   Additional items Increased time required;Standard toilet   Ambulation/Elevation   Gait pattern Improper Weight shift; Antalgic; Forward Flexion;Decreased foot clearance;Decreased L stance; Inconsistent penelope; Step to;Excessively slow   Gait Assistance 5  Supervision   Additional items Verbal cues   Assistive Device Rolling walker   Distance 200'   Stair Management Assistance 5  Supervision   Additional items Verbal cues   Stair Management Technique Two rails; Step to pattern;Nonreciprocal;Foreward   Number of Stairs 12   Balance   Static Sitting Good   Dynamic Sitting Fair +   Static Standing Fair +   Dynamic Standing Fair   Ambulatory Fair -   Endurance Deficit   Endurance Deficit Yes   Endurance Deficit Description Pain, fatigue UE   Activity Tolerance   Activity Tolerance Patient tolerated treatment well;Patient limited by fatigue;Patient limited by pain   Nurse Made Aware Yes, ok to see   Assessment   Prognosis Good   Problem List Decreased strength;Decreased range of motion;Decreased endurance; Impaired balance;Decreased mobility;Pain   Assessment Pt found supine in bed egar to exercise  Before ambulation, nursing detached pt's IV pole  Pt ambulted into the [facility] with a RW going 200' with UE fatigue noted  Instructed pt to stand tall and not to WB through his arms so much  Pt demonstrated compliance but needed re instruction throughout  Pt was able to perform stair management with good functional form and was able to complete all 12 steps with no fatigue noted  Pt was returned to the room where a toileting was performed  Pt demonstrated good standing balance but did need HHA while moving in the bathroom  Pt was returned supine in bed with an ice pack on his L knee  Pt noted a looser feeling and a decrease in pain post session  All needs were met and call bell was in reach  Pt would benefit from continued PT in order to promote safe and functional mobility  Barriers to Discharge None   Goals   Patient Goals to go home   STG Expiration Date 06/30/18   Treatment Day 3   Plan   Treatment/Interventions Functional transfer training;LE strengthening/ROM; Elevations; Therapeutic exercise; Endurance training;Gait training   Progress Improving as expected   PT Frequency Twice a day   Recommendation   Recommendation Outpatient PT     Deonna Nunes PTA

## 2018-06-23 NOTE — PLAN OF CARE
Problem: PHYSICAL THERAPY ADULT  Goal: Performs mobility at highest level of function for planned discharge setting  See evaluation for individualized goals  Treatment/Interventions: Functional transfer training, LE strengthening/ROM, Elevations, Therapeutic exercise, Endurance training, Bed mobility, Gait training, Spoke to nursing, Family  Equipment Recommended: Linard Bernheim       See flowsheet documentation for full assessment, interventions and recommendations  Outcome: Progressing  Prognosis: Good  Problem List: Decreased strength, Decreased range of motion, Decreased endurance, Impaired balance, Decreased mobility, Pain  Assessment: Pt found supine in bed egar to exercise  Before ambulation, nursing detached pt's IV pole  Pt [facility] with a RW going 200' with UE fatigue noted  Instructed pt to stand tall and not to WB through his arms so much  Pt demonstrated compliance but needed re instruction throughout  Pt was able to perform stair management with good functional form and was able to complete all 12 steps with no fatigue noted  Pt was returned to the room where a toileting was performed  Pt demonstrated good standing balance but did need HHA while moving in the bathroom  Pt was returned supine in bed with an ice pack on his L knee  Pt noted a looser feeling and a decrease in pain post session  All needs were met and call bell was in reach  Pt would benefit from continued PT in order to promote safe and functional mobility  Barriers to Discharge: None     Recommendation: Outpatient PT     PT - OK to Discharge: Yes    See flowsheet documentation for full assessment

## 2018-06-23 NOTE — PLAN OF CARE
Problem: Potential for Falls  Goal: Patient will remain free of falls  INTERVENTIONS:  - Assess patient frequently for physical needs  -  Identify cognitive and physical deficits and behaviors that affect risk of falls    -  [place] fall precautions as indicated by assessment   - Educate patient/family on patient safety including physical limitations  - Instruct patient to call for assistance with activity based on assessment  - Modify environment to reduce risk of injury  - Consider OT/PT consult to assist with strengthening/mobility   Outcome: Progressing      Problem: PAIN - ADULT  Goal: Verbalizes/displays adequate comfort level or baseline comfort level  Interventions:  - Encourage patient to monitor pain and request assistance  - Assess pain using appropriate pain scale  - Administer analgesics based on type and severity of pain and evaluate response  - Implement non-pharmacological measures as appropriate and evaluate response  - Consider cultural and social influences on pain and pain management  - Notify physician/advanced practitioner if interventions unsuccessful or patient reports new pain   Outcome: Progressing      Problem: INFECTION - ADULT  Goal: Absence or prevention of progression during hospitalization  INTERVENTIONS:  - Assess and monitor for signs and symptoms of infection  - Monitor lab/diagnostic results  - Monitor all insertion sites, i e  indwelling lines, tubes, and drains  - Monitor endotracheal (as able) and nasal secretions for changes in amount and color  - [place] appropriate cooling/warming therapies per order  - Administer medications as ordered  - Instruct and encourage patient and family to use good hand hygiene technique  - Identify and instruct in appropriate isolation precautions for identified infection/condition   Outcome: Progressing    Goal: Absence of fever/infection during neutropenic period  INTERVENTIONS:  - Monitor WBC  - Implement neutropenic guidelines   Outcome: Progressing      Problem: SAFETY ADULT  Goal: Maintain or return to baseline ADL function  INTERVENTIONS:  -  Assess patient's ability to carry out ADLs; assess patient's baseline for ADL function and identify physical deficits which impact ability to perform ADLs (bathing, care of mouth/teeth, toileting, grooming, dressing, etc )  - Assess/evaluate cause of self-care deficits   - Assess range of motion  - Assess patient's mobility; develop plan if impaired  - Assess patient's need for assistive devices and provide as appropriate  - Encourage maximum independence but intervene and supervise when necessary  ¯ Involve family in performance of ADLs  ¯ Assess for home care needs following discharge   ¯ Request OT consult to assist with ADL evaluation and planning for discharge  ¯ Provide patient education as appropriate   Outcome: Progressing    Goal: Maintain or return mobility status to optimal level  INTERVENTIONS:  - Assess patient's baseline mobility status (ambulation, transfers, stairs, etc )    - Identify cognitive and physical deficits and behaviors that affect mobility  - Identify mobility aids required to assist with transfers and/or ambulation (gait belt, sit-to-stand, lift, walker, cane, etc )  - [place] fall precautions as indicated by assessment  - Record patient progress and toleration of activity level on Mobility SBAR; progress patient to next Phase/Stage  - Instruct patient to call for assistance with activity based on assessment  - Request Rehabilitation consult to assist with strengthening/weightbearing, etc    Outcome: Progressing      Problem: DISCHARGE PLANNING  Goal: Discharge to home or other facility with appropriate resources  INTERVENTIONS:  - Identify barriers to discharge w/patient and caregiver  - Arrange for needed discharge resources and transportation as appropriate  - Identify discharge learning needs (meds, wound care, etc )  - Arrange for interpretive services to assist at discharge as needed  - Refer to Case Management Department for coordinating discharge planning if the patient needs post-hospital services based on physician/advanced practitioner order or complex needs related to functional status, cognitive ability, or social support system   Outcome: Progressing      Problem: Knowledge Deficit  Goal: Patient/family/caregiver demonstrates understanding of disease process, treatment plan, medications, and discharge instructions  Complete learning assessment and assess knowledge base    Interventions:  - Provide teaching at level of understanding  - Provide teaching via preferred learning methods   Outcome: Progressing      Problem: DISCHARGE PLANNING - CARE MANAGEMENT  Goal: Discharge to post-acute care or home with appropriate resources  INTERVENTIONS:  - Conduct assessment to determine patient/family and health care team treatment goals, and need for post-acute services based on payer coverage, community resources, and patient preferences, and barriers to discharge  - Address psychosocial, clinical, and financial barriers to discharge as identified in assessment in conjunction with the patient/family and health care team  - Arrange appropriate level of post-acute services according to patient's   needs and preference and payer coverage in collaboration with the physician and health care team  - Communicate with and update the patient/family, physician, and health care team regarding progress on the discharge plan  - Arrange appropriate transportation to post-acute venues  - Discharge home when medically cleared   Outcome: Progressing

## 2018-06-23 NOTE — PHYSICAL THERAPY NOTE
Physical Therapy Progress Note     06/23/18 1423   Pain Assessment   Pain Assessment 0-10   Pain Score 8   Pain Type Acute pain;Surgical pain   Pain Location Knee   Pain Orientation Left   Restrictions/Precautions   Weight Bearing Precautions Per Order Yes   RUE Weight Bearing Per Order WBAT   LUE Weight Bearing Per Order WBAT   RLE Weight Bearing Per Order WBAT   LLE Weight Bearing Per Order WBAT   Other Precautions WBS; Telemetry;Multiple lines; Fall Risk;Pain   General   Chart Reviewed Yes   Response to Previous Treatment Patient with no complaints from previous session  Family/Caregiver Present No   Cognition   Overall Cognitive Status WFL   Arousal/Participation Alert   Attention Within functional limits   Orientation Level Oriented X4   Memory Within functional limits   Following Commands Follows multistep commands with increased time or repetition   Subjective   Subjective Pt identifed by name and DOM, agreeable to PT  Transfers   Sit to Stand 5  Supervision   Additional items Armrests; Increased time required;Verbal cues   Stand to Sit 5  Supervision   Additional items Increased time required;Armrests   Stand pivot 5  Supervision   Additional items Increased time required;Verbal cues   Toilet transfer 5  Supervision   Additional items Increased time required;Standard toilet   Ambulation/Elevation   Gait pattern Improper Weight shift; Antalgic; Forward Flexion;Decreased foot clearance;Decreased L stance; Inconsistent penelope; Excessively slow   Gait Assistance 5  Supervision   Additional items Verbal cues   Assistive Device Rolling walker   Distance 240'   Balance   Static Sitting Good   Dynamic Sitting Fair +   Static Standing Fair +   Dynamic Standing Fair   Ambulatory Fair -   Endurance Deficit   Endurance Deficit Yes   Endurance Deficit Description pain, fatigue   Activity Tolerance   Activity Tolerance Patient tolerated treatment well;Patient limited by fatigue;Patient limited by pain   Nurse Made Aware yes, ok to see   Exercises   Heelslides Sitting;10 reps;AAROM; Left   Hip Flexion Sitting;10 reps; Left;AROM   Knee Flexion Stretch Sitting;10 reps;AAROM; Left   Ankle Pumps 20 reps;AROM; Bilateral   Assessment   Prognosis Good   Problem List Decreased strength;Decreased range of motion;Decreased endurance; Impaired balance;Decreased mobility;Pain   Assessment Pt found seated in bedside chair egar and motivated to ambulate  Pt had increased stiffness in his L knee due to sitting for a couple hours  Pt was able to ambulate a further distance compared to the Am visit with a more fluent, but still antalgic gait pattern  Pt was able to perform a step throught gait pattern this PM session  Pt returned to bedside chair and was re educated on the importance of his HEP  Pt demonstrated compliance and all needs were met with call bell in reach and ice pack on L knee  Pt would benefit from continued PT in order to promote safe and functional mobility  Barriers to Discharge None   Goals   Patient Goals to go home   STG Expiration Date 06/30/18   Plan   Treatment/Interventions Functional transfer training;LE strengthening/ROM; Elevations; Therapeutic exercise; Endurance training;Patient/family training;Gait training   Progress Improving as expected   PT Frequency Twice a day   Recommendation   Recommendation Outpatient PT     Luann Hsieh, PTA

## 2018-06-24 ENCOUNTER — HOSPITAL ENCOUNTER (OUTPATIENT)
Facility: HOSPITAL | Age: 63
Setting detail: OBSERVATION
Discharge: HOME | End: 2018-06-25
Attending: EMERGENCY MEDICINE | Admitting: INTERNAL MEDICINE
Payer: COMMERCIAL

## 2018-06-24 VITALS
TEMPERATURE: 100 F | DIASTOLIC BLOOD PRESSURE: 82 MMHG | HEART RATE: 98 BPM | RESPIRATION RATE: 18 BRPM | OXYGEN SATURATION: 96 % | WEIGHT: 197.75 LBS | SYSTOLIC BLOOD PRESSURE: 142 MMHG | BODY MASS INDEX: 29.97 KG/M2 | HEIGHT: 68 IN

## 2018-06-24 DIAGNOSIS — Z96.652 STATUS POST TOTAL LEFT KNEE REPLACEMENT: ICD-10-CM

## 2018-06-24 DIAGNOSIS — R50.9 FEVER: Primary | ICD-10-CM

## 2018-06-24 DIAGNOSIS — M25.462 PAIN AND SWELLING OF LEFT KNEE: ICD-10-CM

## 2018-06-24 DIAGNOSIS — A41.9 SEPSIS (HCC): ICD-10-CM

## 2018-06-24 DIAGNOSIS — M25.562 PAIN AND SWELLING OF LEFT KNEE: ICD-10-CM

## 2018-06-24 LAB
ANION GAP SERPL CALCULATED.3IONS-SCNC: 8 MMOL/L (ref 4–13)
BUN SERPL-MCNC: 11 MG/DL (ref 5–25)
CALCIUM SERPL-MCNC: 8.9 MG/DL (ref 8.3–10.1)
CHLORIDE SERPL-SCNC: 101 MMOL/L (ref 100–108)
CO2 SERPL-SCNC: 28 MMOL/L (ref 21–32)
CREAT SERPL-MCNC: 0.92 MG/DL (ref 0.6–1.3)
GFR SERPL CREATININE-BSD FRML MDRD: 104 ML/MIN/1.73SQ M
GLUCOSE SERPL-MCNC: 144 MG/DL (ref 65–140)
GLUCOSE SERPL-MCNC: 166 MG/DL (ref 65–140)
GLUCOSE SERPL-MCNC: 210 MG/DL (ref 65–140)
POTASSIUM SERPL-SCNC: 3.8 MMOL/L (ref 3.5–5.3)
SODIUM SERPL-SCNC: 137 MMOL/L (ref 136–145)

## 2018-06-24 PROCEDURE — 99024 POSTOP FOLLOW-UP VISIT: CPT | Performed by: PHYSICIAN ASSISTANT

## 2018-06-24 PROCEDURE — 99220 PR INITIAL OBSERVATION CARE/DAY 70 MINUTES: CPT | Performed by: INTERNAL MEDICINE

## 2018-06-24 PROCEDURE — 82948 REAGENT STRIP/BLOOD GLUCOSE: CPT

## 2018-06-24 PROCEDURE — 80048 BASIC METABOLIC PNL TOTAL CA: CPT | Performed by: PHYSICIAN ASSISTANT

## 2018-06-24 PROCEDURE — 97116 GAIT TRAINING THERAPY: CPT | Performed by: PHYSICAL THERAPIST

## 2018-06-24 PROCEDURE — 90732 PPSV23 VACC 2 YRS+ SUBQ/IM: CPT | Performed by: ORTHOPAEDIC SURGERY

## 2018-06-24 RX ADMIN — PNEUMOCOCCAL VACCINE POLYVALENT 0.5 ML
25; 25; 25; 25; 25; 25; 25; 25; 25; 25; 25; 25; 25; 25; 25; 25; 25; 25; 25; 25; 25; 25; 25 INJECTION, SOLUTION INTRAMUSCULAR; SUBCUTANEOUS at 10:15

## 2018-06-24 RX ADMIN — ASPIRIN 81 MG: 81 TABLET, COATED ORAL at 08:28

## 2018-06-24 RX ADMIN — OXYCODONE HYDROCHLORIDE 10 MG: 10 TABLET ORAL at 03:26

## 2018-06-24 RX ADMIN — DOCUSATE SODIUM 100 MG: 100 CAPSULE, LIQUID FILLED ORAL at 08:28

## 2018-06-24 RX ADMIN — OXYCODONE HYDROCHLORIDE 10 MG: 10 TABLET ORAL at 07:29

## 2018-06-24 RX ADMIN — AMLODIPINE BESYLATE 10 MG: 10 TABLET ORAL at 08:28

## 2018-06-24 RX ADMIN — GABAPENTIN 100 MG: 100 CAPSULE ORAL at 08:28

## 2018-06-24 RX ADMIN — Medication 400 MCG: at 08:28

## 2018-06-24 RX ADMIN — CARVEDILOL 25 MG: 25 TABLET, FILM COATED ORAL at 07:29

## 2018-06-24 RX ADMIN — ENOXAPARIN SODIUM 40 MG: 100 INJECTION SUBCUTANEOUS at 08:28

## 2018-06-24 RX ADMIN — ALLOPURINOL 100 MG: 100 TABLET ORAL at 08:28

## 2018-06-24 RX ADMIN — OXYCODONE HYDROCHLORIDE AND ACETAMINOPHEN 500 MG: 500 TABLET ORAL at 08:28

## 2018-06-24 RX ADMIN — Medication 325 MG: at 07:29

## 2018-06-24 RX ADMIN — SODIUM CHLORIDE 75 ML/HR: 0.9 INJECTION, SOLUTION INTRAVENOUS at 03:01

## 2018-06-24 RX ADMIN — INSULIN LISPRO 1 UNITS: 100 INJECTION, SOLUTION INTRAVENOUS; SUBCUTANEOUS at 07:34

## 2018-06-24 RX ADMIN — TRAMADOL HYDROCHLORIDE 50 MG: 50 TABLET, FILM COATED ORAL at 05:12

## 2018-06-24 RX ADMIN — INSULIN LISPRO 1 UNITS: 100 INJECTION, SOLUTION INTRAVENOUS; SUBCUTANEOUS at 11:21

## 2018-06-24 RX ADMIN — SENNOSIDES 8.6 MG: 8.6 TABLET, FILM COATED ORAL at 08:28

## 2018-06-24 RX ADMIN — TRAMADOL HYDROCHLORIDE 50 MG: 50 TABLET, FILM COATED ORAL at 11:21

## 2018-06-24 RX ADMIN — OXYCODONE HYDROCHLORIDE 10 MG: 10 TABLET ORAL at 13:29

## 2018-06-24 NOTE — PLAN OF CARE
Problem: PHYSICAL THERAPY ADULT  Goal: Performs mobility at highest level of function for planned discharge setting  See evaluation for individualized goals  Treatment/Interventions: Functional transfer training, LE strengthening/ROM, Elevations, Therapeutic exercise, Endurance training, Bed mobility, Gait training, Spoke to nursing, Family  Equipment Recommended: Ovi Rangel       See flowsheet documentation for full assessment, interventions and recommendations  Outcome: Progressing  Prognosis: Good  Problem List: Decreased strength, Decreased range of motion, Decreased endurance, Impaired balance, Decreased mobility, Pain  Assessment: Patient did very well today  He is eager to go home  He ambulated 300 ft x 1 with a rolling walker and supervision  Patient required frequent rest breaks during the excursion  He told PT he was unable to straighten the R knee completely at this time  PT told him this will take a little while  Patient also negotiated 8 steps with supervision  He did not use a cane in his L hand  He only used the rail on his R  Upon discharge, PT recommends home with family support and OP PT  Barriers to Discharge: None     Recommendation: Home with family support, Outpatient PT     PT - OK to Discharge: Yes (when medically cleared )    See flowsheet documentation for full assessment

## 2018-06-24 NOTE — PROGRESS NOTES
Internal Medicine Progress Note  Patient: Mabel Cobos  Age/sex: 63 y o  male  Medical Record #: [id number]      ASSESSMENT/PLAN:  Mabel Cobos is seen and examined and mangement for following issues:       # L TKR:  Pain management per ortho, c/o pain this a m ; monitor for constipation w/use of narcotics for pain; cont lovenox for DVT prophylaxis     # HTN:  BP is stable this a m  Resume BP meds at discharge      # DM II: cont metformin and sliding scale; monitor BS and titrate based on trend  Blood sugars improved      # HLD: cont statin therapy;      # Anemia:  Hemoglobin 8 4 likely dilutional due to IV fluids  No need for transfusion as patient was asymptomatic  # Elevated creatinine:  Resolved s/p IV fluids  # Leukocytosis: Resolved  Pt is medically stable for discharge          Subjective: Patient seen and examined  Pt reports that he is ready for DC today  Pain is adequately controlled  He denies any other complaints      ROS:   GI: denies abdominal pain, change bowel habits or reflux symptoms  Neuro: No new neurologic changes  Respiratory: No Cough, SOB  Cardiovascular: No CP, palpitations   Musculoskeletal: + knee pain    Scheduled Meds:    Current Facility-Administered Medications:  allopurinol 100 mg Oral BID Song Aguilera    amLODIPine 10 mg Oral Daily EDWIN Chauhan    ascorbic acid 500 mg Oral BID Lizet Townsend    aspirin 81 mg Oral Every Other Day Lizet Townsend    carvedilol 25 mg Oral BID With Meals Lizet Townsend    docusate sodium 100 mg Oral BID oSng Aguilera    enoxaparin 40 mg Subcutaneous Daily Lizet Townsend    ferrous sulfate 325 mg Oral BID With Meals Lizet Townsend    folic acid 021 mcg Oral Daily Song Aguilera    gabapentin 100 mg Oral TID Shannon Nolasco MD    HYDROmorphone 1 mg Intravenous Q4H PRN Lizet Townsend    insulin lispro 1-5 Units Subcutaneous TID AC Fox Huynh DO    metFORMIN 500 mg Oral Daily Song Aguilera    methocarbamol 750 mg Oral Q6H PRN Shannon Nolasco MD metoclopramide 10 mg Intravenous Q6H PRN Song Aguilera    ondansetron 4 mg Intravenous Q4H PRN Cassandra Massing    oxyCODONE 10 mg Oral Q4H PRN Cassandra Massing    oxyCODONE 5 mg Oral Q4H PRN Cassandra Massing    pravastatin 40 mg Oral Daily With [employer]    senna 1 tablet Oral Daily Song Nixkrystian    sodium chloride 100 mL/hr Intravenous Continuous EDWIN Garcia Last Rate: Stopped (06/23/18 1002)   sodium chloride 75 mL/hr Intravenous Continuous Adriana Das PA-C Last Rate: 75 mL/hr (06/24/18 0301)   traMADol 50 mg Oral Q6H [address]        Labs:       Results from last 7 days  Lab Units 06/23/18  0512 06/22/18  0430   WBC Thousand/uL 7 11 10 26*   HEMOGLOBIN g/dL 8 4* 9 4*   HEMATOCRIT % 25 1* 28 4*   PLATELETS Thousands/uL 109* 133*       Results from last 7 days  Lab Units 06/24/18  0505 06/23/18  0512   SODIUM mmol/L 137 133*   POTASSIUM mmol/L 3 8 3 8   CHLORIDE mmol/L 101 97*   CO2 mmol/L 28 27   BUN mg/dL 11 29*   CREATININE mg/dL 0 92 1 87*   GLUCOSE RANDOM mg/dL 144* 158*   CALCIUM mg/dL 8 9 8 7                  Glucose (mg/dL)   Date Value   06/24/2018 144 (H)   06/23/2018 158 (H)   06/22/2018 158 (H)   06/21/2018 184 (H)       Labs reviewed    Physical Examination:  Vitals:   Vitals:    06/23/18 0757 06/23/18 1500 06/23/18 2346 06/24/18 0730   BP: 136/63 133/62 144/64 142/82   BP Location:  Right arm Right arm Right arm   Pulse: (!) 110 92 89 98   Resp: 18 18 18 18   Temp: 98 3 °F (36 8 °C) 98 8 °F (37 1 °C) 99 4 °F (37 4 °C) 100 °F (37 8 °C)   TempSrc:  Oral Oral Oral   SpO2: 92% 90% 94% 96%   Weight:       Height:           GEN: NAD  RESP: CTAB, no R/R/W  CV: +S1 S2, regular rate, no rubs  ABD: soft, NT, ND, normal BS   :  No Lara  EXT: DP pulses intact b/l; dressing intact to knee  1+ edema Lt LE  Neuro: AAOx3    [ X ] Total time spent: 30 Mins and greater than 50% of this time was spent counseling/coordinating care

## 2018-06-24 NOTE — SOCIAL WORK
CM notified that patient is medically clear for d/c  Per CM [place] note patient is set up with Outpatient PT  Commode previously delivered to patient  Pt will need RW  CM placed referral in [address]  CM delivered RW to patient's room  No other CM needs identified

## 2018-06-24 NOTE — DISCHARGE SUMMARY
ORTHOPEDICS DISCHARGE SUMMARY  Bubba Langston 63 y o  male MRN: [id number]  Unit/Bed#: CW3 346-01    Attending Physician: Larry Carvalho    Admitting diagnosis: Arthritis of knee [M17 10]    Discharge diagnosis: Arthritis of knee [M17 10]    Date of admission: 6/20/2018    Date of discharge: 06/24/18         Procedure: Left total knee arthroplasty    HPI:  This is a 63y o  year old male that presented to the office with signs and symptoms of left knee osteoarthritis  They tried and failed conservative treatment measures and wished to proceed with surgical intervention  The risks, benefits, and complications of the procedure were discussed with the patient and informed consent was obtained  Hospital Course: The patient was admitted to the hospital on 6/20/2018 and underwent an uncomplicated left total knee arthroplasty  They were transferred to the floor after a brief stay in the post-anesthesia care unit  Their pain was well managed with IV and oral pain medications  They began therapy on post operative day #1  Lovenox was also started for DVT prophylaxis post operative day #1  Hemovac drain was removed on POD1  On discharge date pt was cleared by PT and the medicine team and determined to be safe for discharge  Daily discussion was had with the patient, nursing staff, orthopaedic team, and family members if present  All questions were answered to the patients satisifaction  0  Lab Value Date/Time   HGB 8 4 (L) 06/23/2018 0512   HGB 9 4 (L) 06/22/2018 0430   HGB 10 3 (L) 06/21/2018 0447   HGB 12 8 05/17/2018 1230           Discharge Instructions: The patient was discharged weight bearing as tolerated to the left lower extremity  Lovenox will be continued for 28 days  Continue PT/OT  Take pain medications as instructed  Discharge Medications: For the complete list of discharge medications, please refer to the patient's medication reconciliation

## 2018-06-24 NOTE — PLAN OF CARE
DISCHARGE PLANNING     Discharge to home or other facility with appropriate resources Adequate for Discharge        DISCHARGE PLANNING - CARE MANAGEMENT     Discharge to post-acute care or home with appropriate resources Adequate for Discharge        INFECTION - ADULT     Absence or prevention of progression during hospitalization Adequate for Discharge     Absence of fever/infection during neutropenic period Adequate for Discharge        Knowledge Deficit     Patient/family/caregiver demonstrates understanding of disease process, treatment plan, medications, and discharge instructions Adequate for Discharge        PAIN - ADULT     Verbalizes/displays adequate comfort level or baseline comfort level Adequate for Discharge        Potential for Falls     Patient will remain free of falls Adequate for Discharge        SAFETY ADULT     Maintain or return to baseline ADL function Adequate for Discharge     Maintain or return mobility status to optimal level Adequate for Discharge

## 2018-06-24 NOTE — PROGRESS NOTES
Progress Note - Orthopedics   Rayray Rivero 63 y o  male MRN: [id number]  Unit/Bed#: CW3 346-01      Subjective:    63 y o male POD 4 left TKA  No acute events, no complaints      Labs:    0  Lab Value Date/Time   HCT 25 1 (L) 06/23/2018 0512   HCT 28 4 (L) 06/22/2018 0430   HCT 31 3 (L) 06/21/2018 0447   HGB 8 4 (L) 06/23/2018 0512   HGB 9 4 (L) 06/22/2018 0430   HGB 10 3 (L) 06/21/2018 0447   INR 1 08 05/17/2018 1230   WBC 7 11 06/23/2018 0512   WBC 10 26 (H) 06/22/2018 0430   WBC 9 49 06/21/2018 0447       Meds:    Current Facility-Administered Medications:     allopurinol (ZYLOPRIM) tablet 100 mg, 100 mg, Oral, BID, Marcos Rodolfo, 100 mg at 06/24/18 0828    amLODIPine (NORVASC) tablet 10 mg, 10 mg, Oral, Daily, Wyvonna Muscat, CRNP, 10 mg at 06/24/18 1548    ascorbic acid (VITAMIN C) tablet 500 mg, 500 mg, Oral, BID, Marcos Rodolfo, 500 mg at 06/24/18 6932    aspirin (ECOTRIN LOW STRENGTH) EC tablet 81 mg, 81 mg, Oral, Every Other Day, Marcos Rodolfo, 81 mg at 06/24/18 8839    carvedilol (COREG) tablet 25 mg, 25 mg, Oral, BID With Meals, Marcos Rodolfo, 25 mg at 06/24/18 2016    docusate sodium (COLACE) capsule 100 mg, 100 mg, Oral, BID, Marcos Rodolfo, 100 mg at 06/24/18 8764    enoxaparin (LOVENOX) subcutaneous injection 40 mg, 40 mg, Subcutaneous, Daily, Marcos Rodolfo, 40 mg at 06/24/18 6024    ferrous sulfate tablet 325 mg, 325 mg, Oral, BID With Meals, Marcos Rodolfo, 325 mg at 64/93/81 8167    folic acid (FOLVITE) tablet 400 mcg, 400 mcg, Oral, Daily, Marcos Rodolfo, 400 mcg at 06/24/18 1574    gabapentin (NEURONTIN) capsule 100 mg, 100 mg, Oral, TID, Josphine Pilar, MD, 100 mg at 06/24/18 0828    HYDROmorphone (DILAUDID) injection 1 mg, 1 mg, Intravenous, Q4H PRN, Marcos Reynolds, 1 mg at 06/21/18 1445    insulin lispro (HumaLOG) 100 units/mL subcutaneous injection 1-5 Units, 1-5 Units, Subcutaneous, TID AC, 1 Units at 06/24/18 1121 **AND** Fingerstick Glucose (POCT), , , TID AC, Crista Oh, DO    metFORMIN (GLUCOPHAGE) tablet 500 mg, 500 mg, Oral, Daily, Volanda Fester, 500 mg at 06/23/18 1733    methocarbamol (ROBAXIN) tablet 750 mg, 750 mg, Oral, Q6H PRN, Thompson Gillette MD, 750 mg at 06/22/18 2332    metoclopramide (REGLAN) injection 10 mg, 10 mg, Intravenous, Q6H PRN, Volanda Fester, 10 mg at 06/21/18 2229    ondansetron [facility]) injection 4 mg, 4 mg, Intravenous, Q4H PRN, Volanda Fester, 4 mg at 06/22/18 2217    oxyCODONE (ROXICODONE) immediate release tablet 10 mg, 10 mg, Oral, Q4H PRN, Volanda Fester, 10 mg at 06/24/18 1329    oxyCODONE (ROXICODONE) IR tablet 5 mg, 5 mg, Oral, Q4H PRN, Volanda Fester    pravastatin (PRAVACHOL) tablet 40 mg, 40 mg, Oral, Daily With Olivia Starch, 40 mg at 06/23/18 1733    senna (SENOKOT) tablet 8 6 mg, 1 tablet, Oral, Daily, Volanda Fester, 8 6 mg at 06/24/18 3639    sodium chloride 0 9 % infusion, 100 mL/hr, Intravenous, Continuous, EDWIN Alcantara, Stopped at 06/23/18 1002    traMADol (ULTRAM) tablet 50 mg, 50 mg, Oral, Q6H [address], Volanda Fester, 50 mg at 06/24/18 1121    Blood Culture:   No results found for: BLOODCX    Wound Culture:   No results found for: WOUNDCULT    Ins and Outs:  I/O last 24 hours: In: 2161 7 [P O :1760; I V :401 7]  Out: 3200 [Urine:3200]          Physical:  Vitals:    06/24/18 0730   BP: 142/82   Pulse: 98   Resp: 18   Temp: 100 °F (37 8 °C)   SpO2: 96%     Musculoskeletal: left Lower Extremity  · Skin has staples and incision CDI  · Dressing CDI  · TTP over incision  · SILT s/s/sp/dp/t  +fhl/ehl, +ankle dorsi/plantar flexion    +PT pulse      Assessment:    62 y o male POD 4 Left TKA     Plan:  · WBAT LLE  · PT/OT  · Pain control  · DVT ppx  · Discharge today    Thompson Gillette MD

## 2018-06-24 NOTE — PHYSICAL THERAPY NOTE
PT TREATMENT NOTE  Patient identified by name, birth date and bracelet]   06/24/18 1000   Pain Assessment   Pain Assessment 0-10   Pain Score 8   Pain Type Acute pain;Surgical pain   Pain Location Knee   Pain Orientation Left   Pain Descriptors Aching;Discomfort;Tightness   Pain Frequency Intermittent   Pain Onset Ongoing   Precautions   Total Knee Replacement Other (Comment)  (none)   Restrictions/Precautions   Weight Bearing Precautions Per Order Yes   RUE Weight Bearing Per Order WBAT   LUE Weight Bearing Per Order WBAT   RLE Weight Bearing Per Order WBAT   LLE Weight Bearing Per Order WBAT   Braces or Orthoses Other (Comment)  (none)   Other Precautions Multiple lines;Telemetry; Fall Risk;Pain   General   Chart Reviewed Yes   Additional Pertinent History L TKA performed on 6/20/2018   Response to Previous Treatment Patient with no complaints from previous session  Family/Caregiver Present No   Cognition   Overall Cognitive Status WFL   Arousal/Participation Alert; Cooperative;Responsive   Attention Within functional limits   Orientation Level Oriented X4   Memory Within functional limits   Following Commands Follows multistep commands with increased time or repetition   Subjective   Subjective "I am going home today at 11:30AM   I am unable to straighten my knee out at this time "    Bed Mobility   Additional Comments Pt OOB and seated in recliner upon PT arrival   Transfers   Sit to Stand 5  Supervision   Additional items Armrests; Increased time required;Verbal cues   Stand to Sit 5  Supervision   Additional items Armrests; Increased time required   Ambulation/Elevation   Gait pattern Excessively slow; Step to;Short stride; Foward flexed; Inconsistent penelope;Decreased foot clearance; Antalgic   Gait Assistance 5  Supervision   Additional items Verbal cues   Assistive Device Rolling walker   Distance 300 ft x 1   Stair Management Assistance 5  Supervision   Additional items Verbal cues   Stair Management Technique One rail R;Step to pattern; Foreward;Nonreciprocal;Other (Comment)  (no AD)   Number of Stairs 8   Ramp Technique Not tested   Balance   Static Sitting Good   Dynamic Sitting Fair +   Static Standing Fair +   Dynamic Standing Fair +   Ambulatory Fair +   Endurance Deficit   Endurance Deficit Yes   Endurance Deficit Description pain, fatigue and general deconditioning   Activity Tolerance   Activity Tolerance Patient tolerated treatment well;Patient limited by fatigue;Patient limited by pain   Nurse Made Aware RN made aware   Equipment Use   Comments PT had patient ambulate 300 ft x 1 with superivision and a rolling walker  Patient also negotiated 8 steps with supervision  Assessment   Prognosis Good   Problem List Decreased strength;Decreased range of motion;Decreased endurance; Impaired balance;Decreased mobility;Pain   Assessment Patient did very well today  He is eager to go home  He ambulated 300 ft x 1 with a rolling walker and supervision  Patient required frequent rest breaks during the excursion  He told PT he was unable to straighten the R knee completely at this time  PT told him this will take a little while  Patient also negotiated 8 steps with supervision  He did not use a cane in his L hand  He only used the rail on his R  Upon discharge, PT recommends home with family support and OP PT  Barriers to Discharge None   Goals   Patient Goals "I want to go home today"   STG Expiration Date 06/30/18   Short Term Goal #1 In 7-10 days: Pt will amb 150 ft w/ RW, mod (I) in order to facilitate safe return to premorbid environment and community amb status- NOT MET Pt will negotiate 4 steps --> 12 steps w/ hand rail and SPC, mod (I) in order to navigate in and out of the house and between levels of home environment safety- PARTIALLY MET  Pt will achieve (I) level w/ bed mob in order to facilitate safety with OOB and back to bed transitions in own living environment- PARTIALLY MET    Pt will perform transfers w/ mod (I) to assure (I) and safety w/ functional mobility/transitions w/ all aspects of mobility/locomotion- PARTIALLY MET Pt will participate in LE therapeutic exercise and balance activities to max progression w/ mobility skills- PARTIALLY MET  Treatment Day 4   Plan   Treatment/Interventions Functional transfer training;LE strengthening/ROM; Elevations; Therapeutic exercise; Endurance training;Equipment eval/education; Bed mobility;Gait training;Spoke to nursing   Progress Improving as expected   PT Frequency Other (Comment)  (6-7x/week; BID prn )   Recommendation   Recommendation Home with family support; Outpatient PT   Equipment Recommended Walker   PT - OK to Discharge Yes  (when medically cleared )         Yakov Argueta, PT

## 2018-06-25 ENCOUNTER — APPOINTMENT (EMERGENCY)
Dept: RADIOLOGY | Facility: HOSPITAL | Age: 63
End: 2018-06-25
Payer: COMMERCIAL

## 2018-06-25 ENCOUNTER — OFFICE VISIT (OUTPATIENT)
Dept: PHYSICAL THERAPY | Facility: REHABILITATION | Age: 63
End: 2018-06-25
Payer: COMMERCIAL

## 2018-06-25 VITALS
RESPIRATION RATE: 18 BRPM | WEIGHT: 197 LBS | TEMPERATURE: 99.6 F | OXYGEN SATURATION: 99 % | DIASTOLIC BLOOD PRESSURE: 76 MMHG | BODY MASS INDEX: 29.86 KG/M2 | HEART RATE: 98 BPM | SYSTOLIC BLOOD PRESSURE: 122 MMHG | HEIGHT: 68 IN

## 2018-06-25 DIAGNOSIS — M25.562 CHRONIC PAIN OF LEFT KNEE: Primary | ICD-10-CM

## 2018-06-25 DIAGNOSIS — G89.29 CHRONIC PAIN OF LEFT KNEE: Primary | ICD-10-CM

## 2018-06-25 PROBLEM — M10.9 GOUT: Status: ACTIVE | Noted: 2018-06-25

## 2018-06-25 PROBLEM — E78.5 DYSLIPIDEMIA: Status: ACTIVE | Noted: 2018-06-25

## 2018-06-25 PROBLEM — Z96.652 STATUS POST TOTAL LEFT KNEE REPLACEMENT: Status: ACTIVE | Noted: 2018-06-25

## 2018-06-25 PROBLEM — R50.9 FEVER: Status: ACTIVE | Noted: 2018-06-25

## 2018-06-25 PROBLEM — I10 ESSENTIAL HYPERTENSION: Status: ACTIVE | Noted: 2018-06-25

## 2018-06-25 PROBLEM — D64.9 ANEMIA: Status: ACTIVE | Noted: 2018-06-25

## 2018-06-25 PROBLEM — E11.9 TYPE 2 DIABETES MELLITUS (HCC): Status: ACTIVE | Noted: 2018-06-25

## 2018-06-25 LAB
ALBUMIN SERPL BCP-MCNC: 2.9 G/DL (ref 3.5–5)
ALP SERPL-CCNC: 34 U/L (ref 46–116)
ALT SERPL W P-5'-P-CCNC: 14 U/L (ref 12–78)
ANION GAP SERPL CALCULATED.3IONS-SCNC: 6 MMOL/L (ref 4–13)
APTT PPP: 30 SECONDS (ref 24–36)
AST SERPL W P-5'-P-CCNC: 26 U/L (ref 5–45)
BACTERIA UR QL AUTO: NORMAL /HPF
BASOPHILS # BLD AUTO: 0.01 THOUSANDS/ΜL (ref 0–0.1)
BASOPHILS NFR BLD AUTO: 0 % (ref 0–1)
BILIRUB SERPL-MCNC: 0.81 MG/DL (ref 0.2–1)
BILIRUB UR QL STRIP: NEGATIVE
BUN SERPL-MCNC: 8 MG/DL (ref 5–25)
CALCIUM SERPL-MCNC: 9.1 MG/DL (ref 8.3–10.1)
CHLORIDE SERPL-SCNC: 95 MMOL/L (ref 100–108)
CLARITY UR: CLEAR
CO2 SERPL-SCNC: 33 MMOL/L (ref 21–32)
COLOR UR: YELLOW
COLOR, POC: YELLOW
CREAT SERPL-MCNC: 0.94 MG/DL (ref 0.6–1.3)
CRP SERPL HS-MCNC: >90 MG/L
EOSINOPHIL # BLD AUTO: 0.11 THOUSAND/ΜL (ref 0–0.61)
EOSINOPHIL NFR BLD AUTO: 2 % (ref 0–6)
ERYTHROCYTE [DISTWIDTH] IN BLOOD BY AUTOMATED COUNT: 11.7 % (ref 11.6–15.1)
ERYTHROCYTE [SEDIMENTATION RATE] IN BLOOD: 90 MM/HOUR (ref 0–10)
GFR SERPL CREATININE-BSD FRML MDRD: 101 ML/MIN/1.73SQ M
GLUCOSE SERPL-MCNC: 175 MG/DL (ref 65–140)
GLUCOSE SERPL-MCNC: 180 MG/DL (ref 65–140)
GLUCOSE SERPL-MCNC: 184 MG/DL (ref 65–140)
GLUCOSE UR STRIP-MCNC: ABNORMAL MG/DL
HCT VFR BLD AUTO: 28.2 % (ref 36.5–49.3)
HGB BLD-MCNC: 9.6 G/DL (ref 12–17)
HGB UR QL STRIP.AUTO: NEGATIVE
HYALINE CASTS #/AREA URNS LPF: NORMAL /LPF
IMM GRANULOCYTES # BLD AUTO: 0.02 THOUSAND/UL (ref 0–0.2)
IMM GRANULOCYTES NFR BLD AUTO: 0 % (ref 0–2)
INR PPP: 1.15 (ref 0.86–1.17)
KETONES UR STRIP-MCNC: ABNORMAL MG/DL
LACTATE SERPL-SCNC: 1 MMOL/L (ref 0.5–2)
LEUKOCYTE ESTERASE UR QL STRIP: ABNORMAL
LYMPHOCYTES # BLD AUTO: 1.16 THOUSANDS/ΜL (ref 0.6–4.47)
LYMPHOCYTES NFR BLD AUTO: 16 % (ref 14–44)
MCH RBC QN AUTO: 32.1 PG (ref 26.8–34.3)
MCHC RBC AUTO-ENTMCNC: 34 G/DL (ref 31.4–37.4)
MCV RBC AUTO: 94 FL (ref 82–98)
MONOCYTES # BLD AUTO: 0.81 THOUSAND/ΜL (ref 0.17–1.22)
MONOCYTES NFR BLD AUTO: 11 % (ref 4–12)
NEUTROPHILS # BLD AUTO: 5.01 THOUSANDS/ΜL (ref 1.85–7.62)
NEUTS SEG NFR BLD AUTO: 71 % (ref 43–75)
NITRITE UR QL STRIP: NEGATIVE
NON-SQ EPI CELLS URNS QL MICRO: NORMAL /HPF
NRBC BLD AUTO-RTO: 0 /100 WBCS
PH UR STRIP.AUTO: 7 [PH] (ref 4.5–8)
PLATELET # BLD AUTO: 164 THOUSANDS/UL (ref 149–390)
PMV BLD AUTO: 10.1 FL (ref 8.9–12.7)
POTASSIUM SERPL-SCNC: 3.7 MMOL/L (ref 3.5–5.3)
PROT SERPL-MCNC: 6.9 G/DL (ref 6.4–8.2)
PROT UR STRIP-MCNC: NEGATIVE MG/DL
PROTHROMBIN TIME: 14.8 SECONDS (ref 11.8–14.2)
RBC # BLD AUTO: 2.99 MILLION/UL (ref 3.88–5.62)
RBC #/AREA URNS AUTO: NORMAL /HPF
SODIUM SERPL-SCNC: 134 MMOL/L (ref 136–145)
SP GR UR STRIP.AUTO: 1.01 (ref 1–1.03)
UROBILINOGEN UR QL STRIP.AUTO: 1 E.U./DL
WBC # BLD AUTO: 7.12 THOUSAND/UL (ref 4.31–10.16)
WBC #/AREA URNS AUTO: NORMAL /HPF

## 2018-06-25 PROCEDURE — G8991 OTHER PT/OT GOAL STATUS: HCPCS | Performed by: PHYSICAL THERAPIST

## 2018-06-25 PROCEDURE — 97110 THERAPEUTIC EXERCISES: CPT | Performed by: PHYSICAL THERAPIST

## 2018-06-25 PROCEDURE — 71046 X-RAY EXAM CHEST 2 VIEWS: CPT

## 2018-06-25 PROCEDURE — 96365 THER/PROPH/DIAG IV INF INIT: CPT

## 2018-06-25 PROCEDURE — G8990 OTHER PT/OT CURRENT STATUS: HCPCS | Performed by: PHYSICAL THERAPIST

## 2018-06-25 PROCEDURE — 97116 GAIT TRAINING THERAPY: CPT | Performed by: PHYSICAL THERAPIST

## 2018-06-25 PROCEDURE — 86141 C-REACTIVE PROTEIN HS: CPT | Performed by: EMERGENCY MEDICINE

## 2018-06-25 PROCEDURE — 87040 BLOOD CULTURE FOR BACTERIA: CPT | Performed by: EMERGENCY MEDICINE

## 2018-06-25 PROCEDURE — 82948 REAGENT STRIP/BLOOD GLUCOSE: CPT

## 2018-06-25 PROCEDURE — 85610 PROTHROMBIN TIME: CPT | Performed by: EMERGENCY MEDICINE

## 2018-06-25 PROCEDURE — 81001 URINALYSIS AUTO W/SCOPE: CPT

## 2018-06-25 PROCEDURE — 80053 COMPREHEN METABOLIC PANEL: CPT | Performed by: EMERGENCY MEDICINE

## 2018-06-25 PROCEDURE — 85730 THROMBOPLASTIN TIME PARTIAL: CPT | Performed by: EMERGENCY MEDICINE

## 2018-06-25 PROCEDURE — 85025 COMPLETE CBC W/AUTO DIFF WBC: CPT | Performed by: EMERGENCY MEDICINE

## 2018-06-25 PROCEDURE — 96361 HYDRATE IV INFUSION ADD-ON: CPT

## 2018-06-25 PROCEDURE — 96375 TX/PRO/DX INJ NEW DRUG ADDON: CPT

## 2018-06-25 PROCEDURE — 83605 ASSAY OF LACTIC ACID: CPT | Performed by: EMERGENCY MEDICINE

## 2018-06-25 PROCEDURE — 97140 MANUAL THERAPY 1/> REGIONS: CPT | Performed by: PHYSICAL THERAPIST

## 2018-06-25 PROCEDURE — 99285 EMERGENCY DEPT VISIT HI MDM: CPT

## 2018-06-25 PROCEDURE — 36415 COLL VENOUS BLD VENIPUNCTURE: CPT | Performed by: EMERGENCY MEDICINE

## 2018-06-25 PROCEDURE — 99225 PR SBSQ OBSERVATION CARE/DAY 25 MINUTES: CPT | Performed by: INTERNAL MEDICINE

## 2018-06-25 PROCEDURE — 85652 RBC SED RATE AUTOMATED: CPT | Performed by: EMERGENCY MEDICINE

## 2018-06-25 RX ORDER — CARVEDILOL 25 MG/1
25 TABLET ORAL 2 TIMES DAILY WITH MEALS
Status: CANCELLED | OUTPATIENT
Start: 2018-06-25

## 2018-06-25 RX ORDER — ASPIRIN 81 MG/1
81 TABLET ORAL EVERY OTHER DAY
Status: CANCELLED | OUTPATIENT
Start: 2018-06-25

## 2018-06-25 RX ORDER — 0.9 % SODIUM CHLORIDE 0.9 %
3 VIAL (ML) INJECTION AS NEEDED
Status: DISCONTINUED | OUTPATIENT
Start: 2018-06-25 | End: 2018-06-25 | Stop reason: HOSPADM

## 2018-06-25 RX ORDER — FERROUS SULFATE 325(65) MG
325 TABLET ORAL 2 TIMES DAILY WITH MEALS
Status: CANCELLED | OUTPATIENT
Start: 2018-06-25

## 2018-06-25 RX ORDER — KETOROLAC TROMETHAMINE 30 MG/ML
15 INJECTION, SOLUTION INTRAMUSCULAR; INTRAVENOUS ONCE
Status: DISCONTINUED | OUTPATIENT
Start: 2018-06-25 | End: 2018-06-25

## 2018-06-25 RX ORDER — OXYCODONE HYDROCHLORIDE 5 MG/1
5 TABLET ORAL EVERY 6 HOURS PRN
Status: DISCONTINUED | OUTPATIENT
Start: 2018-06-25 | End: 2018-06-25 | Stop reason: HOSPADM

## 2018-06-25 RX ORDER — PRAVASTATIN SODIUM 40 MG
40 TABLET ORAL
Status: CANCELLED | OUTPATIENT
Start: 2018-06-25

## 2018-06-25 RX ORDER — ALLOPURINOL 100 MG/1
100 TABLET ORAL 2 TIMES DAILY
Status: CANCELLED | OUTPATIENT
Start: 2018-06-25

## 2018-06-25 RX ORDER — DOCUSATE SODIUM 100 MG/1
100 CAPSULE, LIQUID FILLED ORAL 2 TIMES DAILY
Status: CANCELLED | OUTPATIENT
Start: 2018-06-25

## 2018-06-25 RX ORDER — IBUPROFEN 600 MG/1
600 TABLET ORAL ONCE
Status: COMPLETED | OUTPATIENT
Start: 2018-06-25 | End: 2018-06-25

## 2018-06-25 RX ORDER — FUROSEMIDE 20 MG/1
20 TABLET ORAL DAILY
Status: CANCELLED | OUTPATIENT
Start: 2018-06-25

## 2018-06-25 RX ORDER — IBUPROFEN 400 MG/1
400 TABLET ORAL EVERY 8 HOURS PRN
Status: DISCONTINUED | OUTPATIENT
Start: 2018-06-25 | End: 2018-06-25 | Stop reason: HOSPADM

## 2018-06-25 RX ORDER — LANOLIN ALCOHOL/MO/W.PET/CERES
400 CREAM (GRAM) TOPICAL DAILY
Status: CANCELLED | OUTPATIENT
Start: 2018-06-25

## 2018-06-25 RX ADMIN — IBUPROFEN 600 MG: 600 TABLET ORAL at 00:37

## 2018-06-25 RX ADMIN — SODIUM CHLORIDE 1000 ML: 0.9 INJECTION, SOLUTION INTRAVENOUS at 03:00

## 2018-06-25 RX ADMIN — OXYCODONE HYDROCHLORIDE 5 MG: 5 TABLET ORAL at 04:22

## 2018-06-25 RX ADMIN — ENOXAPARIN SODIUM 40 MG: 100 INJECTION SUBCUTANEOUS at 08:31

## 2018-06-25 RX ADMIN — HYDROMORPHONE HYDROCHLORIDE 0.5 MG: 1 INJECTION, SOLUTION INTRAMUSCULAR; INTRAVENOUS; SUBCUTANEOUS at 00:33

## 2018-06-25 RX ADMIN — VANCOMYCIN HYDROCHLORIDE 1250 MG: 10 INJECTION, POWDER, LYOPHILIZED, FOR SOLUTION INTRAVENOUS at 02:47

## 2018-06-25 RX ADMIN — IBUPROFEN 400 MG: 400 TABLET ORAL at 08:38

## 2018-06-25 RX ADMIN — CEFEPIME HYDROCHLORIDE 2000 MG: 2 INJECTION, POWDER, FOR SOLUTION INTRAVENOUS at 02:16

## 2018-06-25 RX ADMIN — SODIUM CHLORIDE 1000 ML: 0.9 INJECTION, SOLUTION INTRAVENOUS at 00:33

## 2018-06-25 RX ADMIN — INSULIN LISPRO 1 UNITS: 100 INJECTION, SOLUTION INTRAVENOUS; SUBCUTANEOUS at 08:32

## 2018-06-25 NOTE — H&P
H&P Exam - Carmen Section 63 y o  male MRN: [id number]    Unit/Bed#: ED 20 Encounter: [id number]      Assessment/Plan     1  Fever - current clinical suspicion suggests that this may have been a transient systemic response to the pneumonia vaccine that the patient received prior to discharge earlier today  Although he initially met sepsis criteria and there was concern of possible wound infection, I do not suspect that the left TKA site is infected  Elevated hs-CRP is expected post-operatively and does not necessarily indicate infection  Examination is consistent with typical post-operative findings  UA does not indicate UTI  CXR is unremarkable and does not suggest acute pulmonary pathology  Patient was on Lovenox for appropriate DVT prophylaxis  No longer meets sepsis criteria  · Will discontinue antibiotics and continue clinical observation off antibiotics at this time  · Continue to monitor fever trends  · Monitor blood counts with morning CBC  · Follow-up blood cultures  · Incentive spirometry  · Ibuprofen 400 mg every 8 hours as needed for fevers  · If there is any clinical indication of developing infection, will initiate appropriate infectious workup and empiric antibiotic therapy    2  Status post left TKA - POD#5  Typical post-operative changes including localized swelling and warmth  Difficult to assess for true erythema given patient's skin color  Incision appears to be healing well and no drainage or purulence is appreciated  · Orthopedic consultation for ongoing post-operative co-management in setting of recent surgery  · Pain control with ibuprofen and oxycodone  · PT and OT while patient is admitted  · WB and OOB as tolerated  · When resting, will keep left lower extremity elevated to facilitate fluid mobilization and improve edema  · Continue Lovenox for DVT prophylaxis  · SCD for right lower extremity    3  Hypertension - mildly elevated but relatively stable  Most recent /82    · Resume outpatient antihypertensives including amlodipine-olmesartan 10-40 mg daily, carvedilol 25 mg twice daily, and lasix 20 mg daily  · Continue to monitor blood pressures  · Goal is normotension  · Avoid hypotension    4  Type 2 diabetes mellitus - blood sugars mildly elevated but within target range  Hemoglobin A1c on 5/17/2018 was 5 7%  · Will hold outpatient metformin while in hospital  · Correctional scale insulin algorithm 3 for glycemic control  · Continue to monitor blood sugars  · Target -180  · Strict glycemic control to facilitate wound healing  · Avoid hypoglycemia  · Diabetic diet    5  Dyslipidemia - reports compliance with simvastatin as outpatient  · Will continue pravastatin 40 mg in lieu of simvastatin 20 mg  · Outpatient follow-up with PCP for ongoing management    6  Anemia - hemoglobin 9 6  Stable compared to recent blood counts  Baseline appears to be approximately 9 5-10 5  No evidence of bleeding  · Continue iron supplementation  · Continue to monitor blood counts  · Transfuse for hemoglobin less than 7    7  History of gout - no evidence of acute flare  · Continue outpatient dose of allopurinol 100 mg twice daily  · Continue clinical observation      Code Status: Level 1 Full Code  Diet: Cardiac 1, carbohydrate consistent  VTE prophylaxis: Lovenox, SCDs (right leg only)    History of Present Illness     HPI:  Jae Valverde is a 63 y o  male with past medical history of hypertension, dyslipidemia, gout, type 2 diabetes mellitus non-insulin dependent, and osteoarthritis, presents to ED today with complaints of fever and chills and left knee pain and swelling (patient is POD#5 from a left TKA)  Patient recently had left TKA performed and was just discharged from the hospital yesterday afternoon  Prior to his discharge, patient received a pneumonia vaccine  Upon arrival at home, patient reported having a fever and chills  He also reported pain and swelling in the left knee   Out of concern, he decided to return to the hospital     Upon arrival, patient initially met SIRS criteria with a temperature of 102 and a barely elevated heart rate of 102 bpm  He had an elevated hs-CRP >90 (which is expected after recent surgery)  Remainder of workup was unremarkable  There was no leukocytosis  Lactate was normal  However, given the extent of his fever, there was concern for possible surgical site infection  Orthopedic surgery evaluated the patient in the ED and reported that the surgical site did not appear infected and it's appearance was unchanged from that of time of discharge earlier today  There was some lower extremity swelling but that was anticipated post-operatively as well  However, given his recent surgery and concerns of possible surgical site infection, patient was admitted for observation to monitor fever trends  He was given doses of IV vancomycin and cefepime in the ED  Review of Systems   Constitutional: Positive for chills and fever  Cardiovascular: Positive for leg swelling (Distal left lower extremity)  Musculoskeletal:        Left knee pain status post TKA POD#5, staples in place, left deltoid soreness status post pneumonia vaccine injection   All other systems reviewed and are negative        Historical Information   Past Medical History:   Diagnosis Date    Diabetes mellitus ([facility] [address] )     Gout     Hyperlipidemia     Hypertension      Past Surgical History:   Procedure Laterality Date    COLONOSCOPY      GALLBLADDER SURGERY      HEMORRHOID SURGERY      VA TOTAL KNEE ARTHROPLASTY Left 6/20/2018    Procedure: KNEE : COMPLETE REPLACEMENT;  Surgeon: David Wheatley MD;  Location: [facility];  Service: Orthopedics     Social History   History   Alcohol Use    Yes     Comment: social      History   Drug Use No     History   Smoking Status    Never Smoker   Smokeless Tobacco    Never Used     Family History:   Family History   Problem Relation Age of Onset    Diabetes Mother Meds/Allergies   all medications and allergies reviewed  Allergies   Allergen Reactions    Acetaminophen Hives       Objective   Vitals: Blood pressure 146/82, pulse 97, temperature 98 8 °F (37 1 °C), temperature source Oral, resp  rate 16, height 5' 8" (1 727 m), weight 89 4 kg (197 lb), SpO2 97 %  Intake/Output Summary (Last 24 hours) at 06/25/18 0259  Last data filed at 06/25/18 0246   Gross per 24 hour   Intake             1050 ml   Output              300 ml   Net              750 ml       Invasive Devices     Peripheral Intravenous Line            Peripheral IV 06/25/18 Right Antecubital less than 1 day                Physical Exam   Constitutional: He is oriented to person, place, and time  He appears well-developed and well-nourished  No distress  HENT:   Head: Normocephalic and atraumatic  Nose: Nose normal    Mouth/Throat: Oropharynx is clear and moist  No oropharyngeal exudate  Eyes: Conjunctivae and EOM are normal  No scleral icterus  Neck: Neck supple  No JVD present  No tracheal deviation present  Cardiovascular: Normal rate, regular rhythm, normal heart sounds and intact distal pulses  Exam reveals no gallop and no friction rub  No murmur heard  Pulmonary/Chest: Effort normal and breath sounds normal  No respiratory distress  He has no wheezes  He has no rales  He exhibits no tenderness  Abdominal: Soft  Bowel sounds are normal  He exhibits no distension  There is no tenderness  There is no rebound and no guarding  Musculoskeletal: He exhibits edema (Distal left lower extremity below level of knee, nonpitting) and tenderness (Left knee in area of recent surgery)  Neurological: He is alert and oriented to person, place, and time  No cranial nerve deficit  Skin: Skin is warm and dry  No rash noted  He is not diaphoretic  No erythema  No pallor     Area of warmth surrounding left knee in area of recent surgery which is to be expected, incision C/D/I, staples in place Psychiatric: He has a normal mood and affect  His behavior is normal  Judgment and thought content normal    Nursing note and vitals reviewed  Lab Results: I have personally reviewed pertinent reports  Results from last 7 days  Lab Units 06/25/18  0021 06/24/18  0505 06/23/18  0512   SODIUM mmol/L 134* 137 133*   POTASSIUM mmol/L 3 7 3 8 3 8   CHLORIDE mmol/L 95* 101 97*   CO2 mmol/L 33* 28 27   BUN mg/dL 8 11 29*   CREATININE mg/dL 0 94 0 92 1 87*   CALCIUM mg/dL 9 1 8 9 8 7   TOTAL PROTEIN g/dL 6 9  --   --    BILIRUBIN TOTAL mg/dL 0 81  --   --    ALK PHOS U/L 34*  --   --    ALT U/L 14  --   --    AST U/L 26  --   --    GLUCOSE RANDOM mg/dL 180* 144* 158*     Imaging: I have personally reviewed pertinent reports  and I have personally reviewed pertinent films in PACS   No results found  EKG, Pathology, and Other Studies: I have personally reviewed pertinent reports  Code Status: Prior  Advance Directive and Living Will:      Power of :    POLST:      Counseling / Coordination of Care  Total floor / unit time spent today 30 minutes  Greater than 50% of total time was spent with the patient and / or family counseling and / or coordination of care  A description of the counseling / coordination of care: See above

## 2018-06-25 NOTE — SEPSIS NOTE
Sepsis Note   Efe Dejesus 63 y o  male MRN: [id number]  Unit/Bed#: ED 20 Encounter: [id number]            Initial Sepsis Screening     Row Name 06/25/18 0219                Is the patient's history suggestive of a new or worsening infection? (!)  Yes (Proceed)  -EL        Suspected source of infection soft tissue  -EL        Are two or more of the following signs & symptoms of infection both present and new to the patient? (!)  Yes (Proceed)  -EL        Indicate SIRS criteria Hyperthemia > 38 3C (100 9F); Tachycardia > 90 bpm  -EL        If the answer is yes to both questions, suspicion of sepsis is present  --        If severe sepsis is present AND tissue hypoperfusion perists in the hour after fluid resuscitation or lactate > 4, the patient meets criteria for SEPTIC SHOCK  --        Are any of the following organ dysfunction criteria present within 6 hours of suspected infection and SIRS criteria that are NOT considered to be chronic conditions? (!)  Yes  -EL        Organ dysfunction  --        Date of presentation of severe sepsis  --        Time of presentation of severe sepsis  --        Tissue hypoperfusion persists in the hour after crystalloid fluid administration, evidenced, by either:  --        Was hypotension present within one hour of the conclusion of crystalloid fluid administration?  --        Date of presentation of septic shock  --        Time of presentation of septic shock  --          User Key  (r) = Recorded By, (t) = Taken By, (c) = Cosigned By    [address] Name Provider Type    CLIFFORD Alvarez MD Physician               Default Flowsheet Data (last 720 hours)      Sepsis Reassessment     Row Name 06/25/18 0355 06/25/18 0300                Volume Status and Tissue Perfusion Post Fluid Resuscitation- Must Document ALL of the Following:    Vital Signs Reviewed Yes  -BK Yes  -EL       Cardio Normal S1/S2; Regular rate and rhythm; No murmor; No rub or gallop  -BK Normal S1/S2  -EL Pulmonary Normal effort;Clear to auscultation  -BK Normal effort  -EL       Capillary Refill Brisk  -BK Brisk  -EL       Peripheral Pulses Radial;Dorsalis Pedis; Posterior Tibialis  -BK Radial;Dorsalis Pedis  -EL       Peripheral Pulse +2  -BK +2  -EL       Dorsalis Pedis +2  -BK +2  -EL       Posterior Tibialis +2  -BK  --       Skin Warm;Dry  -BK  --          *OR*   Intensive Monitoring- Must Document Two * of the Following Four *:    Vital Signs Reviewed  --  --       * Central Venous Pressure (CVP or RAP)  --  --       * Central Venous Oxygen (SVO2, ScvO2 or Oxygen saturation via central catheter)  --  --       * Bedside Cardiovascular US in IVC diameter and % collapse  --  --       * Passive Leg Raise OR Crystalloid Challenge  --  --         User Key  (r) = Recorded By, (t) = Taken By, (c) = Cosigned By    Initials Name Provider Type    CLIFFORD Dawson Cha, MD Physician    RODRICK Bueno DO Resident

## 2018-06-25 NOTE — PROGRESS NOTES
[facility] Senior Admission Note   Unit/Bed # ED 20 Encounter: [id number]  SOD Team C           Adriana Ovalles 63 y o  male [id number]       Patient seen and examined  Reviewed H&P per Dr Benitez Lung  Agree with the assessment and plan       Assessment/Plan:   Principal Problem:    Fever  Active Problems:    Status post total left knee replacement    Essential hypertension    Type 2 diabetes mellitus (HCC)    Dyslipidemia    Anemia    Gout         Disposition:  OBSERVATION    Expected LOS: <2 Midnights      [facility] Hospital[facility]abil[facility]

## 2018-06-25 NOTE — SEPSIS NOTE
Sepsis Note   Linda La 63 y o  male MRN: [id number]  Unit/Bed#: ED 20 Encounter: [id number]            Initial Sepsis Screening     Row Name 06/25/18 0219                Is the patient's history suggestive of a new or worsening infection? (!)  Yes (Proceed)  -EL        Suspected source of infection soft tissue  -EL        Are two or more of the following signs & symptoms of infection both present and new to the patient? (!)  Yes (Proceed)  -EL        Indicate SIRS criteria Hyperthemia > 38 3C (100 9F); Tachycardia > 90 bpm  -EL        If the answer is yes to both questions, suspicion of sepsis is present  --        If severe sepsis is present AND tissue hypoperfusion perists in the hour after fluid resuscitation or lactate > 4, the patient meets criteria for SEPTIC SHOCK  --        Are any of the following organ dysfunction criteria present within 6 hours of suspected infection and SIRS criteria that are NOT considered to be chronic conditions?  (!)  Yes  -EL        Organ dysfunction  --        Date of presentation of severe sepsis  --        Time of presentation of severe sepsis  --        Tissue hypoperfusion persists in the hour after crystalloid fluid administration, evidenced, by either:  --        Was hypotension present within one hour of the conclusion of crystalloid fluid administration?  --        Date of presentation of septic shock  --        Time of presentation of septic shock  --          User Key  (r) = Recorded By, (t) = Taken By, (c) = Cosigned By    [address] Name Provider Gino Angulo MD Physician

## 2018-06-25 NOTE — PROGRESS NOTES
[facility] Discharge Summary - Medical Abraham Bailon 63 y o  male MRN: [id number]    [address] [facility]  Room / Bed: ED 20/ED 20 Encounter: [id number]    BRIEF OVERVIEW    Admitting Provider: Mohan Diggs MD  Discharge Provider: No att  providers found  Primary Care Physician at Arline Snell MD    Discharge To:  home    Admission Date: 6/24/2018     Discharge Date: 6/25/2018 10:20 AM    Primary Discharge Diagnosis  Principal Problem:    Fever  Active Problems:    Status post total left knee replacement    Essential hypertension    Type 2 diabetes mellitus ([facility] [address] )    Dyslipidemia    Anemia    Gout  Resolved Problems:    * No resolved hospital problems  *      Other Problems Addressed: none    Consulting Providers   Orthopedics     Therapeutic Operative Procedures Performed  none    Diagnostic Procedures Performed  Xr Chest 2 Views    Result Date: 6/25/2018  Impression: No acute cardiopulmonary disease  Workstation performed: [id number]       Results from last 7 days  Lab Units 06/25/18  0021 06/24/18  0505 06/23/18  0512   SODIUM mmol/L 134* 137 133*   POTASSIUM mmol/L 3 7 3 8 3 8   CHLORIDE mmol/L 95* 101 97*   CO2 mmol/L 33* 28 27   BUN mg/dL 8 11 29*   CREATININE mg/dL 0 94 0 92 1 87*   CALCIUM mg/dL 9 1 8 9 8 7   TOTAL PROTEIN g/dL 6 9  --   --    BILIRUBIN TOTAL mg/dL 0 81  --   --    ALK PHOS U/L 34*  --   --    ALT U/L 14  --   --    AST U/L 26  --   --    GLUCOSE RANDOM mg/dL 180* 144* 158*     Discharge Disposition: Home/Self Care  Discharged With Lines: no    Test Results Pending at Discharge: none    Outpatient Follow-Up  Follow-up with PCP  Follow up with consulting providers  Follow-up with Orthopedic as outpatient    Active Issues Requiring Follow-up   none    Code Status: Prior  Advance Directive and Living Will: <no information>  Power of :    POLST:      Medications   See after visit summary for reconciled discharge medications provided to patient and family  Allergies  Allergies   Allergen Reactions    Acetaminophen Hives     Discharge Diet: regular diet  Activity restrictions: none    [address] [facility] [address]  Mr Lady Gonzalez is a 63 y o  male with past medical history significant for hypertension, hyperlipidemia, gout, type 2 diabetes mellitus, osteoarthritis  He was recently treated in the hospital for left TKA in discharge from the hospital with physical therapy follow-up on 06/24  He went home, and noted to be febrile to 107 and therefore re-presented to the emergency department for further evaluation  On initial workup, all labs were within normal limits from his previous discharge labs and he was noted to be febrile at 102° F with a heart rate of 102 beats per minute  Patient was evaluated at bedside by Orthopedic surgery and it was determined at that time that patient appeared to have postoperative changes of swelling as well as erythema of the joint but there was no signs of acute infection in the joint  Patient was admitted for monitoring medically, he remained afebrile following his 1 febrile episode, as patient's orthopedic team who had evaluated him on discharge felt as though the patient was stable for follow-up as an outpatient, patient was discharged home with close follow-up with orthopedics as well as primary care provider early on the morning of 6/25  Presenting Problem/History of Present Illness  Principal Problem:    Fever  Active Problems:    Status post total left knee replacement    Essential hypertension    Type 2 diabetes mellitus ([address] )    Dyslipidemia    Anemia    Gout  Resolved Problems:    * No resolved hospital problems  *        Other Pertinent Test Results  none     Discharge Condition: good    Discharge  Statement   I spent 30 minutes minutes discharging the patient  This time was spent on the day of discharge  I had direct contact with the patient on the day of discharge

## 2018-06-25 NOTE — ED PROVIDER NOTES
History  Chief Complaint   Patient presents with    Fever - 9 weeks to 76 years     [facility] patient was discharged from hospital today  Pt started with fevers once he got home after knee replacement  Pt also c/o pain and swelling in left upper arm where he received the pneumonia shot     HPI  63year-old male's postop day 4 status post left total knee replacement discharged earlier today presents with complaints of fevers as well as chills  Patient says he was discharged around 14:00 and shortly after getting home he spiked a fever  He is complaining of worsening pain and swelling to his left knee otherwise denies cough, abdominal pain, dysuria  Patient is on Lovenox currently for DVT prophylaxis and received a dose this morning  He otherwise denies nausea, vomiting, diarrhea  Patient says he has not had a bowel movement since his surgery  Patient is a type 2 diabetic on metformin  Imp/plan: 62 yo male recent knee replacement pw fever  Did have pneumovax earlier  Concern for surgical site infection  I will check septic w/u, esr, crp, treat pain, fever, reassess  Prior to Admission Medications   Prescriptions Last Dose Informant Patient Reported?  Taking?   allopurinol (ZYLOPRIM) 100 mg tablet   Yes Yes   Sig: TK 1 T PO  BID   amlodipine-olmesartan (KALIN) 10-40 MG   Yes Yes   Sig: TK 1 T PO QD   ascorbic Acid (VITAMIN C) 500 MG CPCR   No Yes   Sig: Take 1 capsule (500 mg total) by mouth daily   aspirin (ECOTRIN LOW STRENGTH) 81 mg EC tablet   Yes Yes   Sig: Take 81 mg by mouth every other day     carvedilol (COREG) 25 mg tablet   Yes Yes   Sig: TK 1 T PO BID WITH FOOD   docusate sodium (COLACE) 100 mg capsule   No Yes   Sig: Take 1 capsule (100 mg total) by mouth 2 (two) times a day   enoxaparin (LOVENOX) 40 mg/0 4 mL   No Yes   Sig: Inject 0 4 mL (40 mg total) under the skin daily for 28 days   ferrous sulfate 324 (65 Fe) mg   No Yes   Sig: Take 1 tablet (324 mg total) by mouth 2 (two) times a day before meals   folic acid (FOLVITE) 035 mcg tablet   No Yes   Sig: Take 1 tablet (400 mcg total) by mouth daily   furosemide (LASIX) 20 mg tablet   Yes Yes   Sig: TK 1 T PO  D   metFORMIN (GLUCOPHAGE) 500 mg tablet   Yes Yes   Si tab po daily   naproxen (NAPROSYN) 500 mg tablet   Yes Yes   Sig: TK 1 T PO  D AFTER FOOD  prn   oxyCODONE (ROXICODONE) 5 mg immediate release tablet   No Yes   Si-2 tabs po q4-6hr prn pain   simvastatin (ZOCOR) 20 mg tablet   Yes Yes   Sig: TK 1 T PO QD IN THE EVENING AFTER SUPPER   traMADol (ULTRAM) 50 mg tablet   No Yes   Sig: Take 1 tablet (50 mg total) by mouth every 6 (six) hours as needed for moderate pain for up to 10 days      Facility-Administered Medications: None       Past Medical History:   Diagnosis Date    Diabetes mellitus ([facility] [address] )     Gout     Hyperlipidemia     Hypertension        Past Surgical History:   Procedure Laterality Date    COLONOSCOPY      GALLBLADDER SURGERY      HEMORRHOID SURGERY      SC TOTAL KNEE ARTHROPLASTY Left 2018    Procedure: KNEE : COMPLETE REPLACEMENT;  Surgeon: Pratibha Pedro MD;  Location: [facility];  Service: Orthopedics       Family History   Problem Relation Age of Onset    Diabetes Mother      I have reviewed and agree with the history as documented  Social History   Substance Use Topics    Smoking status: Never Smoker    Smokeless tobacco: Never Used    Alcohol use Yes      Comment: social         Review of Systems   Constitutional: Positive for activity change, chills, fatigue and fever  Negative for appetite change, diaphoresis and unexpected weight change  HENT: Negative for trouble swallowing and voice change  Eyes: Negative for photophobia  Respiratory: Negative for shortness of breath, wheezing and stridor  Cardiovascular: Negative for chest pain, palpitations and leg swelling  Gastrointestinal: Negative for abdominal pain, diarrhea, nausea and vomiting  Endocrine: Negative for polyuria  Genitourinary: Negative for difficulty urinating and dysuria  Musculoskeletal: Positive for arthralgias and joint swelling  Negative for back pain, gait problem, myalgias, neck pain and neck stiffness  Skin: Negative for rash  Allergic/Immunologic: Negative  Neurological: Negative for dizziness, tremors, seizures, syncope, facial asymmetry, speech difficulty, weakness, light-headedness, numbness and headaches  Hematological: Negative for adenopathy  Does not bruise/bleed easily  Psychiatric/Behavioral: Negative for agitation  Physical Exam  ED Triage Vitals [06/24/18 2347]   Temperature Pulse Respirations Blood Pressure SpO2   (!) 102 °F (38 9 °C) 102 18 167/84 99 %      Temp Source Heart Rate Source Patient Position - Orthostatic VS BP Location FiO2 (%)   Oral Monitor Sitting Right arm --      Pain Score       Worst Possible Pain           Orthostatic Vital Signs  Vitals:    06/25/18 0628 06/25/18 0800 06/25/18 0907 06/25/18 1011   BP: 144/65 138/88 151/71 122/76   Pulse: 94 90 93 98   Patient Position - Orthostatic VS: Lying Lying Lying Lying       Physical Exam   Constitutional: He is oriented to person, place, and time  He appears well-developed and well-nourished  No distress  HENT:   Head: Normocephalic and atraumatic  Right Ear: No hemotympanum  Left Ear: No hemotympanum  Nose: No nasal septal hematoma  Mouth/Throat: Uvula is midline and oropharynx is clear and moist  No oropharyngeal exudate  Eyes: EOM are normal  Pupils are equal, round, and reactive to light  Right eye exhibits no discharge  Left eye exhibits no discharge  No scleral icterus  Neck: Normal range of motion  Neck supple  No JVD present  No tracheal deviation present  No thyromegaly present  Cardiovascular: Normal rate, regular rhythm, normal heart sounds and intact distal pulses  Exam reveals no gallop and no friction rub  No murmur heard    Pulmonary/Chest: Effort normal and breath sounds normal  No stridor  No respiratory distress  He has no wheezes  He has no rales  He exhibits no tenderness  Abdominal: Soft  Bowel sounds are normal  He exhibits no distension and no mass  There is no tenderness  There is no rebound and no guarding  No hernia  Musculoskeletal: Normal range of motion  He exhibits edema (LLE edema  )  He exhibits no tenderness or deformity  Surgical site indurated, inflamed  Sutures c/d/i  No drainage  L calf tenderness  Intact distal pulses  Pain with any movement of knee   Lymphadenopathy:     He has no cervical adenopathy  Neurological: He is alert and oriented to person, place, and time  He has normal strength and normal reflexes  He is not disoriented  No cranial nerve deficit or sensory deficit  GCS eye subscore is 4  GCS verbal subscore is 5  GCS motor subscore is 6  Reflex Scores:       Patellar reflexes are 2+ on the right side and 2+ on the left side  Achilles reflexes are 2+ on the right side and 2+ on the left side  Cn 2-12 grossly intact  No pronator drift  Normal gait  Normal strength/sensation   Skin: Skin is warm and dry  Capillary refill takes less than 2 seconds  No rash noted  He is not diaphoretic  No erythema  No pallor  Psychiatric: He has a normal mood and affect  Nursing note and vitals reviewed          ED Medications  Medications   sodium chloride 0 9 % bolus 1,000 mL (0 mL Intravenous Stopped 6/25/18 0154)   HYDROmorphone (DILAUDID) injection 0 5 mg (0 5 mg Intravenous Given 6/25/18 0033)   ibuprofen (MOTRIN) tablet 600 mg (600 mg Oral Given 6/25/18 0037)   vancomycin (VANCOCIN) 1,250 mg in sodium chloride 0 9 % 250 mL IVPB (0 mg/kg × 89 4 kg Intravenous Stopped 6/25/18 0422)   cefepime (MAXIPIME) 2 g/50 mL dextrose IVPB (0 mg Intravenous Stopped 6/25/18 0245)   sodium chloride 0 9 % bolus 1,000 mL (0 mL Intravenous Stopped 6/25/18 0422)       Diagnostic Studies  Results Reviewed     Procedure Component Value Units Date/Time    LAB RESULTS [[id number]] Resulted:  06/27/18 1112    Lab Status:  Final result Updated:  06/27/18 1112    Blood culture #1 [[id number]] Collected:  06/25/18 0049    Lab Status:  Preliminary result Specimen:  Blood from Hand, Left Updated:  06/27/18 0803     Blood Culture No Growth at 48 hrs  Blood culture #2 [[id number]] Collected:  06/25/18 0021    Lab Status:  Preliminary result Specimen:  Blood from Arm, Right Updated:  06/27/18 0803     Blood Culture No Growth at 48 hrs      Fingerstick Glucose (POCT) [[id number]]  (Abnormal) Collected:  06/25/18 0626    Lab Status:  Final result Updated:  06/25/18 0831     POC Glucose 175 (H) mg/dl     Sedimentation rate, automated [[id number]]  (Abnormal) Collected:  06/25/18 0049    Lab Status:  Final result Specimen:  Blood from Arm, Left Updated:  06/25/18 0142     Sed Rate 90 (H) mm/hour     High sensitivity CRP [[id number]] Collected:  06/25/18 0049    Lab Status:  Final result Specimen:  Blood from Arm, Left Updated:  06/25/18 0125     CRP, High Sensitivity >90 00 mg/L     Narrative:               HsCRP Level       Relative Risk           <1 0 mg/L          Low           1 0 to 3 0 mg/L    Average           >3 0 mg/L          High      Urine Microscopic [[id number]]  (Normal) Collected:  06/25/18 0050    Lab Status:  Final result Specimen:  Urine from Urine, Clean Catch Updated:  06/25/18 0113     RBC, UA None Seen /hpf      WBC, UA None Seen /hpf      Epithelial Cells None Seen /hpf      Bacteria, UA None Seen /hpf      Hyaline Casts, UA None Seen /lpf     POCT urinalysis dipstick [[id number]]  (Normal) Resulted:  06/25/18 0051    Lab Status:  Final result Specimen:  Urine Updated:  06/25/18 0051     Color, UA yellow    Comprehensive metabolic panel [[id number]]  (Abnormal) Collected:  06/25/18 0021    Lab Status:  Final result Specimen:  Blood from Arm, Right Updated:  06/25/18 0047     Sodium 134 (L) mmol/L      Potassium 3 7 mmol/L      Chloride 95 (L) mmol/L      CO2 33 (H) mmol/L      Anion Gap 6 mmol/L      BUN 8 mg/dL      Creatinine 0 94 mg/dL      Glucose 180 (H) mg/dL      Calcium 9 1 mg/dL      AST 26 U/L      ALT 14 U/L      Alkaline Phosphatase 34 (L) U/L      Total Protein 6 9 g/dL      Albumin 2 9 (L) g/dL      Total Bilirubin 0 81 mg/dL      eGFR 101 ml/min/1 73sq m     Narrative:         National Kidney Disease Education Program recommendations are as follows:  GFR calculation is accurate only with a steady state creatinine  Chronic Kidney disease less than 60 ml/min/1 73 sq  meters  Kidney failure less than 15 ml/min/1 73 sq  meters  Lactic Acid x2 [[id number]]  (Normal) Collected:  06/25/18 0021    Lab Status:  Final result Specimen:  Blood from Arm, Right Updated:  06/25/18 0047     LACTIC ACID 1 0 mmol/L     Narrative:         Result may be elevated if tourniquet was used during collection      Protime-INR [[id number]]  (Abnormal) Collected:  06/25/18 0021    Lab Status:  Final result Specimen:  Blood from Arm, Right Updated:  06/25/18 0043     Protime 14 8 (H) seconds      INR 1 15    APTT [[id number]]  (Normal) Collected:  06/25/18 0021    Lab Status:  Final result Specimen:  Blood from Arm, Right Updated:  06/25/18 0043     PTT 30 seconds     ED Urine Macroscopic [[id number]]  (Abnormal) Collected:  06/25/18 0050    Lab Status:  Final result Specimen:  Urine Updated:  06/25/18 0041     Color, UA Yellow     Clarity, UA Clear     pH, UA 7 0     Leukocytes, UA Trace (A)     Nitrite, UA Negative     Protein, UA Negative mg/dl      Glucose,  (1/10%) (A) mg/dl      Ketones, UA 15 (1+) (A) mg/dl      Urobilinogen, UA 1 0 E U /dl      Bilirubin, UA Negative     Blood, UA Negative     Specific Gravity, UA 1 010    Narrative:       CLINITEK RESULT    CBC and differential [[id number]]  (Abnormal) Collected:  06/25/18 0021    Lab Status:  Final result Specimen:  Blood from Arm, Right Updated:  06/25/18 0031     WBC 7 12 Thousand/uL      RBC 2 99 (L) Million/uL      Hemoglobin 9 6 (L) g/dL Hematocrit 28 2 (L) %      MCV 94 fL      MCH 32 1 pg      MCHC 34 0 g/dL      RDW 11 7 %      MPV 10 1 fL      Platelets 078 Thousands/uL      nRBC 0 /100 WBCs      Neutrophils Relative 71 %      Immat GRANS % 0 %      Lymphocytes Relative 16 %      Monocytes Relative 11 %      Eosinophils Relative 2 %      Basophils Relative 0 %      Neutrophils Absolute 5 01 Thousands/µL      Immature Grans Absolute 0 02 Thousand/uL      Lymphocytes Absolute 1 16 Thousands/µL      Monocytes Absolute 0 81 Thousand/µL      Eosinophils Absolute 0 11 Thousand/µL      Basophils Absolute 0 01 Thousands/µL                  XR chest 2 views   ED Interpretation by Darryle Cosier, MD (06/25 0148)   No acute findings      Final Result by Stephon Varner MD (06/25 8149)      No acute cardiopulmonary disease  Workstation performed: [id number]               Procedures  Procedures      Phone Consults  ED Phone Contact    ED Course  ED Course as of Jun 27 2030 Mon Jun 25, 2018   0117 Pt says pain improved on reassessment    0147 ERYTHROCYTE SEDIMENTATION RATE: (!) 90   0148 WBC, UA: None Seen   0152 ERYTHROCYTE SEDIMENTATION RATE: (!) 80   0201 Spoke with orthopedic resident  He said to start systemic antibiotics and he will not be getting a sample of fluid from the knee tonight    0202 I will cover with vanc and cefepime                          Initial Sepsis Screening     Row Name 06/25/18 0219                Is the patient's history suggestive of a new or worsening infection? (!)  Yes (Proceed)  -EL        Suspected source of infection soft tissue  -EL        Are two or more of the following signs & symptoms of infection both present and new to the patient? (!)  Yes (Proceed)  -EL        Indicate SIRS criteria Hyperthemia > 38 3C (100 9F); Tachycardia > 90 bpm  -EL        If the answer is yes to both questions, suspicion of sepsis is present  --        If severe sepsis is present AND tissue hypoperfusion perists in the hour after fluid resuscitation or lactate > 4, the patient meets criteria for SEPTIC SHOCK  --        Are any of the following organ dysfunction criteria present within 6 hours of suspected infection and SIRS criteria that are NOT considered to be chronic conditions? (!)  Yes  -EL        Organ dysfunction  --        Date of presentation of severe sepsis  --        Time of presentation of severe sepsis  --        Tissue hypoperfusion persists in the hour after crystalloid fluid administration, evidenced, by either:  --        Was hypotension present within one hour of the conclusion of crystalloid fluid administration?  --        Date of presentation of septic shock  --        Time of presentation of septic shock  --          User Key  (r) = Recorded By, (t) = Taken By, (c) = Cosigned By    [address] Name Provider Type    CLIFFORD Hawkins MD Physician           Default Flowsheet Data (last 720 hours)      Sepsis Reassessment     Row Name 06/25/18 035 06/25/18 0300                Volume Status and Tissue Perfusion Post Fluid Resuscitation- Must Document ALL of the Following:    Vital Signs Reviewed Yes  -BK Yes  -EL       Cardio Normal S1/S2; Regular rate and rhythm; No murmor; No rub or gallop  -BK Normal S1/S2  -EL       Pulmonary Normal effort;Clear to auscultation  -BK Normal effort  -EL       Capillary Refill Brisk  -BK Brisk  -EL       Peripheral Pulses Radial;Dorsalis Pedis; Posterior Tibialis  -BK Radial;Dorsalis Pedis  -EL       Peripheral Pulse +2  -BK +2  -EL       Dorsalis Pedis +2  -BK +2  -EL       Posterior Tibialis +2  -BK  --       Skin Warm;Dry  -BK  --          *OR*   Intensive Monitoring- Must Document Two * of the Following Four *:    Vital Signs Reviewed  --  --       * Central Venous Pressure (CVP or RAP)  --  --       * Central Venous Oxygen (SVO2, ScvO2 or Oxygen saturation via central catheter)  --  --       * Bedside Cardiovascular US in IVC diameter and % collapse  --  --       * Passive Leg Raise OR Crystalloid Challenge  --  --         User Key  (r) = Recorded By, (t) = Taken By, (c) = Cosigned By    [address] Name Provider Type    CLIFFORD Balderas MD Physician    RODRICK Dhillon DO Resident                [facility]  CritCare Time    Disposition  Final diagnoses:   Fever   Sepsis ([address] )   Pain and swelling of left knee     Time reflects when diagnosis was documented in both MDM as applicable and the Disposition within this note     Time User Action Codes Description Comment    6/25/2018  2:55 AM Anita Bill T Add [R50 9] Fever     6/25/2018  2:55 AM Anita Bill T Add [A41 9] Sepsis ([address] )     6/25/2018  2:55 AM Danieltierra Juniorain Add [Q30 125,  M25 462] Pain and swelling of left knee     6/25/2018  3:54 AM Potter Ciales Add [T52 645] Status post total left knee replacement     6/25/2018  3:54 AM Potter Ciales Modify [F25 539] Status post total left knee replacement       ED Disposition     ED Disposition Condition Comment    Admit  Case was discussed with SOD and the patient's admission status was agreed to be Admission Status: observation status to the service of Dr Aldo Galarza           Follow-up Information     Follow up With Specialties Details Why Nader Colon MD Internal Medicine Follow up  [address] [address]  [phone]            Discharge Medication List as of 6/25/2018 10:13 AM      CONTINUE these medications which have NOT CHANGED    Details   allopurinol (ZYLOPRIM) 100 mg tablet TK 1 T PO  BID, Historical Med      amlodipine-olmesartan (KALIN) 10-40 MG TK 1 T PO QD, Historical Med      ascorbic Acid (VITAMIN C) 500 MG CPCR Take 1 capsule (500 mg total) by mouth daily, Starting Thu 4/26/2018, Print      aspirin (ECOTRIN LOW STRENGTH) 81 mg EC tablet Take 81 mg by mouth every other day  , Historical Med      carvedilol (COREG) 25 mg tablet TK 1 T PO BID WITH FOOD, Historical Med      docusate sodium (COLACE) 100 mg capsule Take 1 capsule (100 mg total) by mouth 2 (two) times a day, Starting Fri 6/22/2018, Print      enoxaparin (LOVENOX) 40 mg/0 4 mL Inject 0 4 mL (40 mg total) under the skin daily for 28 days, Starting Wed 6/20/2018, Until Wed 7/18/2018, Print      ferrous sulfate 324 (65 Fe) mg Take 1 tablet (324 mg total) by mouth 2 (two) times a day before meals, Starting Thu 7/66/7592, Print      folic acid (FOLVITE) 577 mcg tablet Take 1 tablet (400 mcg total) by mouth daily, Starting Thu 4/26/2018, Print      furosemide (LASIX) 20 mg tablet TK 1 T PO  D, Historical Med      metFORMIN (GLUCOPHAGE) 500 mg tablet 1 tab po daily, Historical Med      naproxen (NAPROSYN) 500 mg tablet TK 1 T PO  D AFTER FOOD  prn, Historical Med      oxyCODONE (ROXICODONE) 5 mg immediate release tablet 1-2 tabs po q4-6hr prn pain, Print      simvastatin (ZOCOR) 20 mg tablet TK 1 T PO QD IN THE EVENING AFTER SUPPER, Historical Med      traMADol (ULTRAM) 50 mg tablet Take 1 tablet (50 mg total) by mouth every 6 (six) hours as needed for moderate pain for up to 10 days, Starting Wed 6/20/2018, Until Sat 6/30/2018, Print             Outpatient Discharge Orders  Discharge Diet     Activity as tolerated         ED Provider  Attending physically available and evaluated Hans Pink I managed the patient along with the ED Attending      Electronically Signed by         Judy Benitez MD  06/27/18 2030

## 2018-06-25 NOTE — SEPSIS NOTE
Sepsis Note   Valencia [address] y o  male MRN: [id number]  Unit/Bed#: ED 20 Encounter: [id number]            Initial Sepsis Screening     Row Name 06/25/18 0219                Is the patient's history suggestive of a new or worsening infection? (!)  Yes (Proceed)  -EL        Suspected source of infection soft tissue  -EL        Are two or more of the following signs & symptoms of infection both present and new to the patient? (!)  Yes (Proceed)  -EL        Indicate SIRS criteria Hyperthemia > 38 3C (100 9F); Tachycardia > 90 bpm  -EL        If the answer is yes to both questions, suspicion of sepsis is present  --        If severe sepsis is present AND tissue hypoperfusion perists in the hour after fluid resuscitation or lactate > 4, the patient meets criteria for SEPTIC SHOCK  --        Are any of the following organ dysfunction criteria present within 6 hours of suspected infection and SIRS criteria that are NOT considered to be chronic conditions?  (!)  Yes  -EL        Organ dysfunction  --        Date of presentation of severe sepsis  --        Time of presentation of severe sepsis  --        Tissue hypoperfusion persists in the hour after crystalloid fluid administration, evidenced, by either:  --        Was hypotension present within one hour of the conclusion of crystalloid fluid administration?  --        Date of presentation of septic shock  --        Time of presentation of septic shock  --          User Key  (r) = Recorded By, (t) = Taken By, (c) = Cosigned By    [address] Name Provider Type    CLIFFORD Murrell MD Physician               Default Flowsheet Data (last 720 hours)      Sepsis Reassessment     Row Name 06/25/18 0300                   Volume Status and Tissue Perfusion Post Fluid Resuscitation- Must Document ALL of the Following:    Vital Signs Reviewed Yes  -EL        Cardio Normal S1/S2  -EL        Pulmonary Normal effort  -EL        Capillary Refill Brisk  -EL        Peripheral Pulses Radial;Dorsalis Pedis  -EL        Peripheral Pulse +2  -EL        Dorsalis Pedis +2  -EL        Skin  --           *OR*   Intensive Monitoring- Must Document Two * of the Following Four *:    Vital Signs Reviewed  --        * Central Venous Pressure (CVP or RAP)  --        * Central Venous Oxygen (SVO2, ScvO2 or Oxygen saturation via central catheter)  --        * Bedside Cardiovascular US in IVC diameter and % collapse  --        * Passive Leg Raise OR Crystalloid Challenge  --          User Key  (r) = Recorded By, (t) = Taken By, (c) = Cosigned By    Initials Name Provider Dora Watkins MD Physician

## 2018-06-25 NOTE — CONSULTS
Orthopedics   Michael Foster 63 y o  male MRN: [id number]  Unit/Bed#: X ray      Chief Complaint:   left knee pain    HPI:   63 y o male POD 5 s/p L TKA who was discharged on 6/24 presents back to the ED complaining of left knee pain and a fever at home  He felt fine while leaving but spiked a fever at home as high as 107 degrees with increased pain in his knee  He was able to bear weight through the pain but decided that he needed to come back to be evaluated  He complains of sweats and chills with no nausea or vomiting  Review Of Systems:   · Skin: Normal  · Neuro: See HPI  · Musculoskeletal: See HPI  · 14 point review of systems negative except as stated above     Past Medical History:   Past Medical History:   Diagnosis Date    Diabetes mellitus ([facility] [address] )     Gout     Hyperlipidemia     Hypertension        Past Surgical History:   Past Surgical History:   Procedure Laterality Date    COLONOSCOPY      GALLBLADDER SURGERY      HEMORRHOID SURGERY      PA TOTAL KNEE ARTHROPLASTY Left 6/20/2018    Procedure: KNEE : COMPLETE REPLACEMENT;  Surgeon: Leatha Hernandez MD;  Location: [facility];  Service: Orthopedics       Family History:  Family history reviewed and non-contributory  Family History   Problem Relation Age of Onset    Diabetes Mother        Social History:  Social History     Social History    Marital status: /Civil Union     Spouse name: N/A    Number of children: N/A    Years of education: N/A     Social History Main Topics    Smoking status: Never Smoker    Smokeless tobacco: Never Used    Alcohol use Yes      Comment: social     Drug use: No    Sexual activity: Not Asked     Other Topics Concern    None     Social History Narrative    None       Allergies:    Allergies   Allergen Reactions    Acetaminophen Hives           Labs:    0  Lab Value Date/Time   HCT 28 2 (L) 06/25/2018 0021   HCT 25 1 (L) 06/23/2018 0512   HCT 28 4 (L) 06/22/2018 0430   HGB 9 6 (L) 06/25/2018 0021 HGB 8 4 (L) 06/23/2018 0512   HGB 9 4 (L) 06/22/2018 0430   INR 1 15 06/25/2018 0021   WBC 7 12 06/25/2018 0021   WBC 7 11 06/23/2018 0512   WBC 10 26 (H) 06/22/2018 0430   ESR 90 (H) 06/25/2018 0049       Meds:    Current Facility-Administered Medications:     enoxaparin (LOVENOX) subcutaneous injection 40 mg, 40 mg, Subcutaneous, Q24H EUSEBIA, Castillo Mackay MD    ibuprofen (MOTRIN) tablet 400 mg, 400 mg, Oral, Q8H PRN, Sylvie Dhillon DO    insulin lispro (HumaLOG) 100 units/mL subcutaneous injection 1-6 Units, 1-6 Units, Subcutaneous, TID AC **AND** Fingerstick Glucose (POCT), , , TID AC, Sylvie Dhillon DO    oxyCODONE (ROXICODONE) IR tablet 5 mg, 5 mg, Oral, Q6H PRN, Sylvie Dhillon DO, 5 mg at 06/25/18 0422    sodium chloride (PF) 0 9 % injection 3 mL, 3 mL, Intravenous, PRN, Ayse Balderas MD    Current Outpatient Prescriptions:     allopurinol (ZYLOPRIM) 100 mg tablet, TK 1 T PO  BID, Disp: , Rfl: 3    amlodipine-olmesartan (KALIN) 10-40 MG, TK 1 T PO QD, Disp: , Rfl: 5    ascorbic Acid (VITAMIN C) 500 MG CPCR, Take 1 capsule (500 mg total) by mouth daily, Disp: 30 capsule, Rfl: 0    aspirin (ECOTRIN LOW STRENGTH) 81 mg EC tablet, Take 81 mg by mouth every other day  , Disp: , Rfl:     carvedilol (COREG) 25 mg tablet, TK 1 T PO BID WITH FOOD, Disp: , Rfl: 6    docusate sodium (COLACE) 100 mg capsule, Take 1 capsule (100 mg total) by mouth 2 (two) times a day, Disp: 10 capsule, Rfl: 0    enoxaparin (LOVENOX) 40 mg/0 4 mL, Inject 0 4 mL (40 mg total) under the skin daily for 28 days, Disp: 11 2 mL, Rfl: 0    ferrous sulfate 324 (65 Fe) mg, Take 1 tablet (324 mg total) by mouth 2 (two) times a day before meals, Disp: 60 tablet, Rfl: 0    folic acid (FOLVITE) 299 mcg tablet, Take 1 tablet (400 mcg total) by mouth daily, Disp: 30 tablet, Rfl: 0    furosemide (LASIX) 20 mg tablet, TK 1 T PO  D, Disp: , Rfl: 5    metFORMIN (GLUCOPHAGE) 500 mg tablet, 1 tab po daily, Disp: , Rfl: 5    naproxen (NAPROSYN) 500 mg tablet, TK 1 T PO  D AFTER FOOD  prn, Disp: , Rfl: 1    oxyCODONE (ROXICODONE) 5 mg immediate release tablet, 1-2 tabs po q4-6hr prn pain, Disp: 30 tablet, Rfl: 0    simvastatin (ZOCOR) 20 mg tablet, TK 1 T PO QD IN THE EVENING AFTER SUPPER, Disp: , Rfl: 2    traMADol (ULTRAM) 50 mg tablet, Take 1 tablet (50 mg total) by mouth every 6 (six) hours as needed for moderate pain for up to 10 days, Disp: 30 tablet, Rfl: 0    Blood Culture:   No results found for: BLOODCX    Wound Culture:   No results found for: WOUNDCULT    Ins and Outs:  I/O last 24 hours: In: 2300 [IV Piggyback:2300]  Out: 300 [Urine:300]          Physical Exam:   /82 (BP Location: Left arm)   Pulse 98   Temp 98 8 °F (37 1 °C) (Oral)   Resp 18   Ht 5' 8" (1 727 m)   Wt 89 4 kg (197 lb)   SpO2 96%   BMI 29 95 kg/m²   Gen: Alert and oriented to person, place, time  HEENT: EOMI, eyes clear, moist mucus membranes, hearing intact  Respiratory: Bilateral chest rise  No audible wheezing found  Cardiovascular: No audible murmurs  Abdomen: soft  · Musculoskeletal: rightLower Extremity  · Skin: Incision CDI with staples in place, no erythema, drainage, or increased warmth around area compared to rest of body  · Painful range of motion of knee 0 to 90 degrees  · Sensation intact L2-S1  · No micromotion tenderness  · 2+ DP Pulse    Radiology:   I personally reviewed the films  No new imaging needed  Assessment:  63 y o male POD 5 s/p L TKA with post-operative fever  Knee does not clinically look infected  Fever probably from atelectasis or inflammatory response after pneumovax   ED was instructed that patient could be safely discharged from an orthopaedic standpoint but felt that patient needed to stay for further monitoring    Plan:   · WBAT LLE  · PT/OT  · Pain control  · Monitor vitals and clinical picture for signs of infection  · Heat to affected area  · Dispo: Ortho will follow      Claribel Arshad MD

## 2018-06-25 NOTE — CASE MANAGEMENT
Initial Clinical Review    Thank you,  [facility] in the [facility] by [employer] Parra for 2017  Network Utilization Review Department  Phone: [phone]; Fax [phone]  ATTENTION: The Network Utilization Review Department is now centralized for our 7 Facilities  Make a note that we have a new phone and fax numbers for our Department  Please call with any questions or concerns to [phone] and carefully follow the prompts so that you are directed to the right person  All voicemails are confidential  Fax any determinations, approvals, denials, and requests for initial or continue stay review clinical to [phone]  Due to HIGH CALL volume, it would be easier if you could please send faxed requests to expedite your requests and in part, help us provide discharge notifications faster  Admission: Date/Time/Statement: Placed in Observation status on 6/25 @ 02:57    Orders Placed This Encounter   Procedures    Place in Observation (expected length of stay for this patient is less than two midnights)     Standing Status:   Standing     Number of Occurrences:   1     Order Specific Question:   Admitting Physician     Answer:   Charlotte Warner [[id number]]     Order Specific Question:   Level of Care     Answer:   Med Surg [16]         ED: Date/Time/Mode of Arrival:   ED Arrival Information     Expected Arrival Acuity Means of Arrival Escorted By Service Admission Type    - 6/24/2018 23:39 Emergent Walk-In Family Member General Medicine Emergency    Arrival Complaint    Fever           Chief Complaint:   Chief Complaint   Patient presents with    Fever - 9 weeks to 76 years     family states patient was discharged from hospital today  Pt started with fevers once he got home after knee replacement   Pt also c/o pain and swelling in left upper arm where he received the pneumonia shot       History of Illness: Dee Toscano is a 63 y o  male presents to ED today with complaints of fever and chills and left knee pain and swelling (patient is POD#5 from a left TKA)  Patient  was just discharged from the hospital yesterday afternoon  Prior to his discharge, patient received a pneumonia vaccine  Upon arrival at home, patient reported having a fever and chills  He also reported pain and swelling in the left knee   Out of concern, he decided to return to the hospital     ED Vital Signs:   ED Triage Vitals [06/24/18 2347]   Temperature Pulse Respirations Blood Pressure SpO2   (!) 102 °F (38 9 °C) 102 18 167/84 99 %      Temp Source Heart Rate Source Patient Position - Orthostatic VS BP Location FiO2 (%)   Oral Monitor Sitting Right arm --      Pain Score       Worst Possible Pain        Wt Readings from Last 1 Encounters:   06/24/18 89 4 kg (197 lb)         Abnormal Labs/Diagnostic Test Results:      Ref Range & Units 6/25/18 0021   WBC 4 31 - 10 16 Thousand/uL 7 12    RBC 3 88 - 5 62 Million/uL 2 99     Hemoglobin 12 0 - 17 0 g/dL 9 6     Hematocrit 36 5 - 49 3 % 28 2     MCV 82 - 98 fL 94    MCH 26 8 - 34 3 pg 32 1    MCHC 31 4 - 37 4 g/dL 34 0    RDW 11 6 - 15 1 % 11 7    MPV 8 9 - 12 7 fL 10 1    Platelets 594 - 858 Thousands/uL 164    nRBC /100 WBCs 0    Neutrophils Relative 43 - 75 % 71    Immat GRANS % 0 - 2 % 0    Lymphocytes Relative 14 - 44 % 16    Monocytes Relative 4 - 12 % 11    Eosinophils Relative 0 - 6 % 2    Basophils Relative 0 - 1 % 0    Neutrophils Absolute 1 85 - 7 62 Thousands/µL 5 01    Immature Grans Absolute 0 00 - 0 20 Thousand/uL 0 02    Lymphocytes Absolute 0 60 - 4 47 Thousands/µL 1 16    Monocytes Absolute 0 17 - 1 22 Thousand/µL 0 81    Eosinophils Absolute 0 00 - 0 61 Thousand/µL 0 11    Basophils Absolute 0 00 - 0 10 Thousands/µL 0 01              Ref Range & Units 6/25/18 0021   Sodium 136 - 145 mmol/L 134     Potassium 3 5 - 5 3 mmol/L 3 7    Chloride 100 - 108 mmol/L 95     CO2 21 - 32 mmol/L 33     Anion Gap 4 - 13 mmol/L 6    BUN 5 - 25 mg/dL 8 Creatinine 0 60 - 1 30 mg/dL 0 94    Glucose 65 - 140 mg/dL 180     Calcium 8 3 - 10 1 mg/dL 9 1    AST 5 - 45 U/L 26    ALT 12 - 78 U/L 14    Alkaline Phosphatase 46 - 116 U/L 34     Total Protein 6 4 - 8 2 g/dL 6 9    Albumin 3 5 - 5 0 g/dL 2 9     Total Bilirubin 0 20 - 1 00 mg/dL 0 81    eGFR ml/min/1 73sq m 101          CRP - High Sensitivity - >90 00  Sed rate 90  Blood Cultures  Pt/Inr 14 8/1 15    Urine - leukocytes - Trace - Glucose 100 - ketones - 15    CXR - no acute         ED Treatment:   Medication Administration from 06/24/2018 2339 to 06/25/2018 0920       Date/Time Order Dose Route Action     06/25/2018 0033 sodium chloride 0 9 % bolus 1,000 mL 1,000 mL Intravenous New Bag     06/25/2018 0033 HYDROmorphone (DILAUDID) injection 0 5 mg 0 5 mg Intravenous Given     06/25/2018 0037 ibuprofen (MOTRIN) tablet 600 mg 600 mg Oral Given     06/25/2018 0247 vancomycin (VANCOCIN) 1,250 mg in sodium chloride 0 9 % 250 mL IVPB 1,250 mg Intravenous New Bag     06/25/2018 0216 cefepime (MAXIPIME) 2 g/50 mL dextrose IVPB 2,000 mg Intravenous New Bag     06/25/2018 0300 sodium chloride 0 9 % bolus 1,000 mL 1,000 mL Intravenous New Bag     06/25/2018 0838 ibuprofen (MOTRIN) tablet 400 mg 400 mg Oral Given     06/25/2018 0422 oxyCODONE (ROXICODONE) IR tablet 5 mg 5 mg Oral Given     06/25/2018 0832 insulin lispro (HumaLOG) 100 units/mL subcutaneous injection 1-6 Units 1 Units Subcutaneous Given       Past Medical/Surgical History:   Diagnosis    Diabetes mellitus ([address] )    Gout    Hyperlipidemia    Hypertension       Admitting Diagnosis: Fever [R50 9]    Age/Sex: 63 y o  male    Assessment/Plan:   1  Fever - current clinical suspicion suggests that this may have been a transient systemic response to the pneumonia vaccine that the patient received prior to discharge earlier today   Although he initially met sepsis criteria and there was concern of possible wound infection, I do not suspect that the left TKA site is infected  Elevated hs-CRP is expected post-operatively and does not necessarily indicate infection  Examination is consistent with typical post-operative findings  UA does not indicate UTI  CXR is unremarkable and does not suggest acute pulmonary pathology  Patient was on Lovenox for appropriate DVT prophylaxis  No longer meets sepsis criteria  ? Will discontinue antibiotics and continue clinical observation off antibiotics at this time  ? Continue to monitor fever trends  ? Monitor blood counts with morning CBC  ? Follow-up blood cultures  ? Incentive spirometry  ? Ibuprofen 400 mg every 8 hours as needed for fevers  ? If there is any clinical indication of developing infection, will initiate appropriate infectious workup and empiric antibiotic therapy     2  Status post left TKA - POD#5  Typical post-operative changes including localized swelling and warmth  Difficult to assess for true erythema given patient's skin color  Incision appears to be healing well and no drainage or purulence is appreciated  ? Orthopedic consultation for ongoing post-operative co-management in setting of recent surgery  ? Pain control with ibuprofen and oxycodone  ? PT and OT while patient is admitted  ? WB and OOB as tolerated  ? When resting, will keep left lower extremity elevated to facilitate fluid mobilization and improve edema  ? Continue Lovenox for DVT prophylaxis  ? SCD for right lower extremity     3  Hypertension - mildly elevated but relatively stable  Most recent /82   ? Resume outpatient antihypertensives including amlodipine-olmesartan 10-40 mg daily, carvedilol 25 mg twice daily, and lasix 20 mg daily  ? Continue to monitor blood pressures  ? Goal is normotension  ? Avoid hypotension     4  Type 2 diabetes mellitus - blood sugars mildly elevated but within target range  Hemoglobin A1c on 5/17/2018 was 5 7%  ?  Will hold outpatient metformin while in hospital  ? Correctional scale insulin algorithm 3 for glycemic control  ? Continue to monitor blood sugars  ? Target -180  ? Strict glycemic control to facilitate wound healing  ? Avoid hypoglycemia  ? Diabetic diet     5  Dyslipidemia - reports compliance with simvastatin as outpatient  ? Will continue pravastatin 40 mg in lieu of simvastatin 20 mg  ? Outpatient follow-up with PCP for ongoing management     6  Anemia - hemoglobin 9 6  Stable compared to recent blood counts  Baseline appears to be approximately 9 5-10 5  No evidence of bleeding  ? Continue iron supplementation  ? Continue to monitor blood counts  ? Transfuse for hemoglobin less than 7     7  History of gout - no evidence of acute flare  ? Continue outpatient dose of allopurinol 100 mg twice daily  ? Continue clinical observation       Admission Orders:  Scheduled Meds:   Current Facility-Administered Medications:  enoxaparin 40 mg Subcutaneous Q24H [facility]    ibuprofen 400 mg Oral Q8H PRN 6/25 x 1    insulin lispro 1-6 Units Subcutaneous TID AC    oxyCODONE 5 mg Oral Q6H PRN 6/25 x 1    sodium chloride (PF) 3 mL Intravenous PRN      Nursing orders - VS q 4 - Incentive spirometry - elevate extremity - diet cardiac step 1  Cons carb     Orthopedic Surgery - consult -Assessment:  62 y o male POD 5 s/p L TKA with post-operative fever  Knee does not clinically look infected   Fever probably from atelectasis or inflammatory response after pneumovax     Plan:   · WBAT LLE  · PT/OT  · Pain control  · Monitor vitals and clinical picture for signs of infection  · Heat to affected area  · Dispo: Ortho will follow

## 2018-06-25 NOTE — ED NOTES
Pt provided with a bedside commode and assisted to the commode        Crys Humphreys RN  06/25/18 7161

## 2018-06-25 NOTE — PROGRESS NOTES
PT Re-Evaluation     Today's date: 2018  Patient name: Doreen Jewell  : 1955  MRN: [id number]  Referring provider: Asha Kerns MD  Dx:   Encounter Diagnosis     ICD-10-CM    1  Chronic pain of left knee M25 562     G89 29                   Assessment  Impairments: pain with function and weight-bearing intolerance    Assessment details: Doreen Jewell is a 63 y o  male with significant medical history of DM who presents s/p L TKA performed on 18  Jenn Kim presents with evaluation findings of decreased knee ROM and strength, difficulty with ambulation and pain with all activity  These deficits are manifested functionally in difficulty with community and household ambulation and ADL's  Jenn Kim will benefit from physical therapy to improve knee ROM and strength and allow for return to ADL's without pain  Understanding of Dx/Px/POC: good   Prognosis: good  Prognosis details: Short Term  1: Patient will report a 50% decrease in pain in 2-4 weeks  2: Pt will have knee ROM 0-120 degrees in   3  Pt will have knee strength 5/5 in both directions in 6 weeks  Long Term  1: Patient will demonstrate independence in home exercise program by discharge  2: Patient will have FOTO score of (to be determined following surgery) indicating improved function in 8 weeks  Plan  Patient would benefit from: skilled physical therapy  Planned therapy interventions: neuromuscular re-education, therapeutic exercise, strengthening, stretching and manual therapy  Frequency: 2x week  Duration in weeks: 12  Treatment plan discussed with: patient        Subjective Evaluation    History of Present Illness  Mechanism of injury: Pt reports that he underwent surgery on   States that he was told that it went ok  States that he was in the hospital until yesterday  States that he attempted to go on a walk outside yesterday, states that he went about 20 yards   States that before he went to bed a noticed that he was cold but his knee was hot  States that he had a fever and so he returned to the hospital via ED  States that he was told there is no infection in his knee and the fever was likely secondary to a different cause  States that his pain is controled with the medication     Pain  At best pain ratin  At worst pain ratin          Objective     Active Range of Motion   Left Knee   Flexion: 155 degrees   Extension: 0 degrees     Right Knee   Flexion: 75 degrees   Extension: -50 degrees     Passive Range of Motion     Right Knee   Flexion: 95 degrees   Extension: -15 degrees     Strength/Myotome Testing     Left Knee   Flexion: 5  Extension: 5    Right Knee   Flexion: 5  Extension: 5    General Comments     Knee Comments  TU seconds (pre- op)  TUG post op: 32 seconds          Precautions: DM2, HTN    Daily Treatment Diary     Manual              PROM LONG                                                                    Exercise Diary              Bike             Quad set HEP            Heel slides HEP            Prolonged end range stretch  HEP            Ankle pumps HEP            Standing hamstring curls             LAQ                                                                                                                                                                                           Modalities

## 2018-06-25 NOTE — DISCHARGE INSTRUCTIONS
Osteoarthritis   AMBULATORY CARE:   Osteoarthritis  occurs when cartilage (tissue that cushions a joint) wears away slowly and causes the bones to rub together  Osteoarthritis (OA) is a long-term condition that often affects the hands, neck, lower back, knees, and hips  OA is also called arthrosis or degenerative joint disease  Common signs and symptoms include the following:   · Joint pain that gets worse when you move the joint     · Joint stiffness that decreases after you move the joint     · Decreased range of movement     · Hard, bony enlargement on your fingers or toes    · A grinding or cracking sound when you move your joint  Seek care immediately if:   · You have severe pain  · You cannot move your joint  Contact your healthcare provider if:   · You have a fever  · Your joint is red and tender  · You have questions or concerns about your condition or care  Treatment for osteoarthritis  may include any of the following:  · Acetaminophen  is used to decrease pain  It is available without a doctor's order  Ask how much to take and how often to take it  Follow directions  Acetaminophen can cause liver damage if not taken correctly  · NSAIDs , such as ibuprofen, help decrease swelling, pain, and fever  This medicine is available with or without a doctor's order  NSAIDs can cause stomach bleeding or kidney problems in certain people  If you take blood thinner medicine, always ask your healthcare provider if NSAIDs are safe for you  Always read the medicine label and follow directions  · Capsaicin cream  may help decrease pain in your joint  · Prescription pain medicine  may be given to decrease severe pain if other medicines do not work  Take the medicine as directed  Do not wait until the pain is severe before you take your medicine  · A steroid injection  may be given if your symptoms get worse      · Physical therapy  is used to teach you exercises to help improve movement and strength, and to decrease pain  · Surgery  may be needed if other treatments do not work  Manage osteoarthritis   · Stay active  Physical activity may reduce your pain and improve your ability to do daily activities  Avoid activities that cause pain  Ask your healthcare provider what type of exercise would be best for you  · Maintain a healthy weight  This helps decrease the strain on the joints in your back, hips, knees, ankles, and feet  Ask your healthcare provider how much you should weigh  Ask him to help you create a weight loss plan if you are overweight  · Use heat or ice  on your joints as directed  Heat and ice help decrease pain, swelling, and muscle spasms  Use a heating pad on a low setting or take a warm bath  Use an ice pack, or put crushed ice in a plastic bag  Cover it with a towel  · Massage  the muscles around the joint to relieve pain and stiffness  · Use a cane, crutches, or a walker  to protect and relieve pressure on your ankle, knee, and hip joints  You may also be prescribed shoe inserts to decrease pressure in your joints  · Wear flat or low-heeled shoes  This will help decrease pain and reduce pressure on your ankle, knee, and hip joints  Follow up with your healthcare provider as directed:  Write down your questions so you remember to ask them during your visits  © 2017 [address] Information is for End User's use only and may not be sold, redistributed or otherwise used for commercial purposes  All illustrations and images included in CareNotes® are the copyrighted property of [employer]  or [employer] Parra  The above information is an  only  It is not intended as medical advice for individual conditions or treatments  Talk to your doctor, nurse or pharmacist before following any medical regimen to see if it is safe and effective for you

## 2018-06-25 NOTE — ED ATTENDING ATTESTATION
Javier Bryant MD, saw and evaluated the patient  I have discussed the patient with the resident/non-physician practitioner and agree with the resident's/non-physician practitioner's findings, Plan of Care, and MDM as documented in the resident's/non-physician practitioner's note, except where noted  All available labs and Radiology studies were reviewed  At this point I agree with the current assessment done in the Emergency Department  I have conducted an independent evaluation of this patient a history and physical is as follows: This 31-year-old male is postop day four status post total knee arthroplasty who presents with fever of 102  Patient was discharged approximately 12 hours ago  Patient denies any cough, shortness of breath  Patient denies any urinary complaints  Patient states however his knee has begun to feel warmer, more painful, more difficult to move  Patient also complains of the leg is more swollen  On exam patient is warm to the touch, clear lung sounds, regular heart sounds, soft nontender abdomen  Patient has intact 2+ DP pulses bilaterally  Left knee has intact staples with no purulent drainage noted  However knee does appear markedly enlarged, warm, perhaps mildly erythematous which is difficult to determine based on patient's underlying skin color  Patient is postoperative so infectious etiology will be look for including pneumonia, urinary tract infection, cellulitis, joint infection  Will discuss with Ortho and admit  Empiric antibiotics will also be begun  Workup reveals no evidence of urinary tract infection or pneumonia  Patient likely has infection of his operative site weather superficially or in the joint  Case discussed with Orthopedics who are not going to evaluate this patient tonight  Instead we will start antibiotics  They have asked the patient to be admitted to Medicine    Discussions are ongoing between medicine Orthopedics regarding the proper location for this patient    Critical Care Time  CritCare Time    Procedures

## 2018-06-26 ENCOUNTER — TELEPHONE (OUTPATIENT)
Dept: OBGYN CLINIC | Facility: HOSPITAL | Age: 63
End: 2018-06-26

## 2018-06-26 NOTE — TELEPHONE ENCOUNTER
Call placed for post-op follow up  I also saw pt was in ED for fevers and was [facility] yesterday  Pt reporting "yesterday I did have a high fever, but I feel ok today"  Pt denying any fevers today, pt denies weakness, palpitations or chills  Pt reporting "I feel a little pain"  He reports he is taking one tablet of tramadol every 6 hours and one tablet of oxycodone every 4 hour  Pt reporting that his pain does increase to severe though  I suggested when he has breakthrough pain, he can take 2 tablets of oxycodone (rather than one)- directions for oxycodone read 1-2 tablets every 4-6 hours  I also suggested icing the SS multiple times/day for 30 minutes at a time  Pt reporting he is icing around bedtime but will add to his daytime regimen  Pt confirms he is ambulating using the walker, he denies falls  Pt reporting his wife administers him his lovenox injections "before she goes to work"  Pt asking what the best time to administer lovenox is  I suggested an AM administration time that is consistent from day to day  I stressed the importance of timely and consistent administration  Pt reporting he has been doing is around 0600 prior to his wife leaving, I suggested he continue to do it at that time  LBM 6/25, pt denies nausea/vomiting/abd pain  Pt reporting he took one dose of miralax yesterday and had a successful BM as result  Pt does confirm he is taking one caplet of docusate daily  Pt reporting he had outpt PT yesterday and "i did alright"  Pt currently denying CP/SOB/dizziness/calf pain/fevers  AVS, AVS med list and F/Us reviewed with pt  Pt denying having any questions/concerns at this time

## 2018-06-27 ENCOUNTER — OFFICE VISIT (OUTPATIENT)
Dept: OBGYN CLINIC | Facility: HOSPITAL | Age: 63
End: 2018-06-27

## 2018-06-27 ENCOUNTER — HOSPITAL ENCOUNTER (OUTPATIENT)
Dept: RADIOLOGY | Facility: HOSPITAL | Age: 63
Discharge: HOME | End: 2018-06-27
Attending: ORTHOPAEDIC SURGERY
Payer: COMMERCIAL

## 2018-06-27 VITALS
BODY MASS INDEX: 29.87 KG/M2 | DIASTOLIC BLOOD PRESSURE: 87 MMHG | HEIGHT: 68 IN | HEART RATE: 96 BPM | SYSTOLIC BLOOD PRESSURE: 138 MMHG | WEIGHT: 197.09 LBS

## 2018-06-27 DIAGNOSIS — Z96.652 AFTERCARE FOLLOWING LEFT KNEE JOINT REPLACEMENT SURGERY: ICD-10-CM

## 2018-06-27 DIAGNOSIS — Z96.652 AFTERCARE FOLLOWING LEFT KNEE JOINT REPLACEMENT SURGERY: Primary | ICD-10-CM

## 2018-06-27 DIAGNOSIS — Z47.1 AFTERCARE FOLLOWING LEFT KNEE JOINT REPLACEMENT SURGERY: ICD-10-CM

## 2018-06-27 DIAGNOSIS — Z47.1 AFTERCARE FOLLOWING LEFT KNEE JOINT REPLACEMENT SURGERY: Primary | ICD-10-CM

## 2018-06-27 PROCEDURE — 99024 POSTOP FOLLOW-UP VISIT: CPT | Performed by: ORTHOPAEDIC SURGERY

## 2018-06-27 PROCEDURE — 73560 X-RAY EXAM OF KNEE 1 OR 2: CPT

## 2018-06-27 NOTE — PROGRESS NOTES
63 y o male presents for 1 week postoperative visit status post left TKA (op date: 6/20/18)  He had 1 incident of isolated fevers which presented to the emergency department for and he had benign lab work which resulted discharge from the emergency department  He does have expected amount of pain in the left knee  He is participating in outpatient physical therapy has only been to 1 session  Otherwise has minimal complaints and is ambulating with the use of walker      Review of Systems  Review of systems negative unless otherwise specified in HPI    Past Medical History  Past Medical History:   Diagnosis Date    Diabetes mellitus ([address] )     Gout     Hyperlipidemia     Hypertension        Past Surgical History  Past Surgical History:   Procedure Laterality Date    COLONOSCOPY      GALLBLADDER SURGERY      HEMORRHOID SURGERY      [place] TOTAL KNEE ARTHROPLASTY Left 6/20/2018    Procedure: KNEE : COMPLETE REPLACEMENT;  Surgeon: Tab Mix MD;  Location: [facility];  Service: Orthopedics       Current Medications  Current Outpatient Prescriptions on File Prior to Visit   Medication Sig Dispense Refill    allopurinol (ZYLOPRIM) 100 mg tablet TK 1 T PO  BID  3    amlodipine-olmesartan (KALIN) 10-40 MG TK 1 T PO QD  5    ascorbic Acid (VITAMIN C) 500 MG CPCR Take 1 capsule (500 mg total) by mouth daily 30 capsule 0    aspirin (ECOTRIN LOW STRENGTH) 81 mg EC tablet Take 81 mg by mouth every other day        carvedilol (COREG) 25 mg tablet TK 1 T PO BID WITH FOOD  6    docusate sodium (COLACE) 100 mg capsule Take 1 capsule (100 mg total) by mouth 2 (two) times a day 10 capsule 0    enoxaparin (LOVENOX) 40 mg/0 4 mL Inject 0 4 mL (40 mg total) under the skin daily for 28 days 11 2 mL 0    ferrous sulfate 324 (65 Fe) mg Take 1 tablet (324 mg total) by mouth 2 (two) times a day before meals 60 tablet 0    folic acid (FOLVITE) 786 mcg tablet Take 1 tablet (400 mcg total) by mouth daily 30 tablet 0    furosemide (LASIX) 20 mg tablet TK 1 T PO  D  5    metFORMIN (GLUCOPHAGE) 500 mg tablet 1 tab po daily  5    naproxen (NAPROSYN) 500 mg tablet TK 1 T PO  D AFTER FOOD  prn  1    oxyCODONE (ROXICODONE) 5 mg immediate release tablet 1-2 tabs po q4-6hr prn pain 30 tablet 0    simvastatin (ZOCOR) 20 mg tablet TK 1 T PO QD IN THE EVENING AFTER SUPPER  2    traMADol (ULTRAM) 50 mg tablet Take 1 tablet (50 mg total) by mouth every 6 (six) hours as needed for moderate pain for up to 10 days 30 tablet 0     No current facility-administered medications on file prior to visit          Recent Labs [facility])    0  Lab Value Date/Time   HCT 28 2 (L) 06/25/2018 0021   HGB 9 6 (L) 06/25/2018 0021   WBC 7 12 06/25/2018 0021   INR 1 15 06/25/2018 0021   ESR 90 (H) 06/25/2018 0049   GLUCOSE 180 (H) 06/25/2018 0021   HGBA1C 5 7 05/17/2018 1230         Physical exam  General: Awake, Alert, Oriented  HEENT: No scleral injection, no evidence of facial trauma  Heart: Extremities warm and well perfused  Lungs: No audible wheezing  ·     Left  Knee exam  · Knee appropriately swelling  · Active knee range of motion 3-95 degrees with some pain at the appropriate level  · Extremity warm well perfused  · Midline incision healing well with no drainage or evidence of infection or erythema    Imaging  Plain films of left knee reveal total knee arthroplasty components in satisfactory position with no evidence of loosening or failure    Procedure  None    Assessment plan:  72-year-old male now 1 week status post left total knee arthroplasty, doing well    Plan:  Weightbear as tolerated left lower extremity  Keep incision covered, patient may shower in leading soapy water trickle past wound without soaking or taking bath  Oral analgesics as needed for pain  Continue outpatient therapy  Continue Lovenox to full course  Follow-up in 1 week for staple removal  All questions were answered today    Mar Hash  06/27/18

## 2018-06-28 ENCOUNTER — OFFICE VISIT (OUTPATIENT)
Dept: PHYSICAL THERAPY | Facility: REHABILITATION | Age: 63
End: 2018-06-28
Payer: COMMERCIAL

## 2018-06-28 DIAGNOSIS — G89.29 CHRONIC PAIN OF LEFT KNEE: Primary | ICD-10-CM

## 2018-06-28 DIAGNOSIS — M25.562 CHRONIC PAIN OF LEFT KNEE: Primary | ICD-10-CM

## 2018-06-28 DIAGNOSIS — Z96.652 AFTERCARE FOLLOWING LEFT KNEE JOINT REPLACEMENT SURGERY: ICD-10-CM

## 2018-06-28 DIAGNOSIS — Z47.1 AFTERCARE FOLLOWING LEFT KNEE JOINT REPLACEMENT SURGERY: ICD-10-CM

## 2018-06-28 PROCEDURE — 97140 MANUAL THERAPY 1/> REGIONS: CPT | Performed by: PHYSICAL THERAPIST

## 2018-06-28 PROCEDURE — G0283 ELEC STIM OTHER THAN WOUND: HCPCS | Performed by: PHYSICAL THERAPIST

## 2018-06-28 PROCEDURE — 97112 NEUROMUSCULAR REEDUCATION: CPT | Performed by: PHYSICAL THERAPIST

## 2018-06-28 PROCEDURE — 97014 ELECTRIC STIMULATION THERAPY: CPT | Performed by: PHYSICAL THERAPIST

## 2018-06-28 PROCEDURE — 97110 THERAPEUTIC EXERCISES: CPT | Performed by: PHYSICAL THERAPIST

## 2018-06-28 NOTE — PROGRESS NOTES
Daily Note     Today's date: 2018  Patient name: Dylan Pool  : 1955  MRN: [id number]  Referring provider: Ainsa Jimenez MD  Dx:   Encounter Diagnosis     ICD-10-CM    1  Chronic pain of left knee M25 562     G89 29                   Subjective: Pt reports that he has been performing HEP  States he is still unable to sleep secondary to pain despite use of pain medication  Objective: See treatment diary below  Precautions: DM2, HTN     Daily Treatment Diary      Manual                     PROM LONG                                                                                                                           Exercise Diary                      Bike    10                   Quad set HEP  10                   Heel slides HEP  10x10"                   Prolonged end range stretch  HEP  NP                   Ankle pumps HEP                     Standing hamstring curls                       LAQ     10                                                                                                                                                                                                                                                                                                                                                 Modalities                         NMES to L quad    10                    Cold    8'                                                   Assessment: Tolerated treatment fair  Patient demonstrated fatigue post treatment and would benefit from continued PT  Pt shows great knee flexion currently but continues to be limited in extension and has difficulty with quad contraction  Plan: Continue per plan of care

## 2018-06-30 LAB
BACTERIA BLD CULT: NORMAL
BACTERIA BLD CULT: NORMAL

## 2018-07-02 ENCOUNTER — OFFICE VISIT (OUTPATIENT)
Dept: PHYSICAL THERAPY | Facility: REHABILITATION | Age: 63
End: 2018-07-02
Payer: COMMERCIAL

## 2018-07-02 DIAGNOSIS — M25.562 CHRONIC PAIN OF LEFT KNEE: Primary | ICD-10-CM

## 2018-07-02 DIAGNOSIS — Z47.1 AFTERCARE FOLLOWING LEFT KNEE JOINT REPLACEMENT SURGERY: ICD-10-CM

## 2018-07-02 DIAGNOSIS — Z96.652 AFTERCARE FOLLOWING LEFT KNEE JOINT REPLACEMENT SURGERY: ICD-10-CM

## 2018-07-02 DIAGNOSIS — G89.29 CHRONIC PAIN OF LEFT KNEE: Primary | ICD-10-CM

## 2018-07-02 PROCEDURE — G0283 ELEC STIM OTHER THAN WOUND: HCPCS

## 2018-07-02 PROCEDURE — 97110 THERAPEUTIC EXERCISES: CPT

## 2018-07-02 PROCEDURE — 97014 ELECTRIC STIMULATION THERAPY: CPT

## 2018-07-02 PROCEDURE — 97140 MANUAL THERAPY 1/> REGIONS: CPT

## 2018-07-02 PROCEDURE — 97112 NEUROMUSCULAR REEDUCATION: CPT

## 2018-07-02 NOTE — PROGRESS NOTES
Daily Note     Today's date: 2018  Patient name: Ella Marshall  : 1955  MRN: [id number]  Referring provider: Annie Jackson MD  Dx:   Encounter Diagnosis     ICD-10-CM    1  Chronic pain of left knee M25 562     G89 29    2  Aftercare following left knee joint replacement surgery Z47 1     Z96 652                   Subjective: Pt reports increased pain upon arrival, states he had more pain last night and this morning  Objective: See treatment diary below    Precautions: DM2, HTN     Daily Treatment Diary      Manual                   PROM LONG    KP                                                                                                                       Exercise Diary                    Bike    10  7                 Quad set HEP  10  10                 Heel slides HEP  10x10" 10x10"                 Prolonged end range stretch  HEP  NP                   Ankle pumps HEP                     Standing hamstring curls                       LAQ     10  10                                                                                                                                                                                                                                                                                                                                               Modalities                       NMES to L quad    10  10'                  Cold    8'  8'                                               Assessment: Tolerated treatment fair  Patient demonstrated fatigue post treatment and would benefit from continued PT  Pt able to complete full revolutions on bike  Pt lacking in quad contraction  Pt noted relief at end of session  Pt arrived late for session  Pt 1:1 with PTA [id number], IEP, NMES, and ice for duration  Plan: Continue per plan of care

## 2018-07-03 DIAGNOSIS — G89.29 CHRONIC PAIN OF LEFT KNEE: ICD-10-CM

## 2018-07-03 DIAGNOSIS — M25.562 CHRONIC PAIN OF LEFT KNEE: ICD-10-CM

## 2018-07-03 RX ORDER — OXYCODONE HYDROCHLORIDE 5 MG/1
TABLET ORAL
Qty: 60 TABLET | Refills: 0 | Status: SHIPPED | OUTPATIENT
Start: 2018-07-03 | End: 2018-07-05

## 2018-07-03 RX ORDER — TRAMADOL HYDROCHLORIDE 50 MG/1
50 TABLET ORAL EVERY 6 HOURS PRN
Qty: 60 TABLET | Refills: 0 | Status: SHIPPED | OUTPATIENT
Start: 2018-07-03 | End: 2018-07-05 | Stop reason: DRUGHIGH

## 2018-07-03 NOTE — TELEPHONE ENCOUNTER
Patient is calling to state that the insurance will not cover his prescriptions without a prior auth    Please call #[phone]

## 2018-07-03 NOTE — TELEPHONE ENCOUNTER
Pt is requesting a refill on his oxycodone 5mg and tramadol 50mg   He uses the [facility] on [address]

## 2018-07-05 ENCOUNTER — OFFICE VISIT (OUTPATIENT)
Dept: OBGYN CLINIC | Facility: HOSPITAL | Age: 63
End: 2018-07-05

## 2018-07-05 ENCOUNTER — OFFICE VISIT (OUTPATIENT)
Dept: PHYSICAL THERAPY | Facility: REHABILITATION | Age: 63
End: 2018-07-05
Payer: COMMERCIAL

## 2018-07-05 VITALS — DIASTOLIC BLOOD PRESSURE: 80 MMHG | SYSTOLIC BLOOD PRESSURE: 136 MMHG | HEART RATE: 90 BPM

## 2018-07-05 DIAGNOSIS — Z96.652 STATUS POST TOTAL LEFT KNEE REPLACEMENT: Primary | ICD-10-CM

## 2018-07-05 DIAGNOSIS — Z96.652 AFTERCARE FOLLOWING LEFT KNEE JOINT REPLACEMENT SURGERY: ICD-10-CM

## 2018-07-05 DIAGNOSIS — G89.29 CHRONIC PAIN OF LEFT KNEE: Primary | ICD-10-CM

## 2018-07-05 DIAGNOSIS — Z47.1 AFTERCARE FOLLOWING LEFT KNEE JOINT REPLACEMENT SURGERY: ICD-10-CM

## 2018-07-05 DIAGNOSIS — M25.562 CHRONIC PAIN OF LEFT KNEE: Primary | ICD-10-CM

## 2018-07-05 PROCEDURE — 99024 POSTOP FOLLOW-UP VISIT: CPT | Performed by: ORTHOPAEDIC SURGERY

## 2018-07-05 PROCEDURE — 97014 ELECTRIC STIMULATION THERAPY: CPT

## 2018-07-05 PROCEDURE — 97112 NEUROMUSCULAR REEDUCATION: CPT

## 2018-07-05 PROCEDURE — 97110 THERAPEUTIC EXERCISES: CPT

## 2018-07-05 PROCEDURE — G0283 ELEC STIM OTHER THAN WOUND: HCPCS

## 2018-07-05 PROCEDURE — 97140 MANUAL THERAPY 1/> REGIONS: CPT

## 2018-07-05 RX ORDER — TRAMADOL HYDROCHLORIDE 50 MG/1
50 TABLET ORAL 2 TIMES DAILY
Qty: 56 TABLET | Refills: 0 | Status: SHIPPED | OUTPATIENT
Start: 2018-07-05 | End: 2018-08-02

## 2018-07-05 RX ORDER — CEPHALEXIN 500 MG/1
CAPSULE ORAL
Qty: 12 CAPSULE | Refills: 0 | Status: SHIPPED | OUTPATIENT
Start: 2018-07-05 | End: 2018-07-26

## 2018-07-05 RX ORDER — OXYCODONE HCL 10 MG/1
TABLET, FILM COATED, EXTENDED RELEASE ORAL
Qty: 42 TABLET | Refills: 0 | Status: SHIPPED | OUTPATIENT
Start: 2018-07-05

## 2018-07-05 RX ORDER — OXYCODONE HYDROCHLORIDE 5 MG/1
5 TABLET ORAL EVERY 4 HOURS PRN
Qty: 30 TABLET | Refills: 0 | Status: CANCELLED | OUTPATIENT
Start: 2018-07-05

## 2018-07-05 NOTE — PROGRESS NOTES
Daily Note     Today's date: 2018  Patient name: Valencia Underwood  : 1955  MRN: [id number]  Referring provider: Jenni Hand MD  Dx:   Encounter Diagnosis     ICD-10-CM    1  Chronic pain of left knee M25 562     G89 29    2  Aftercare following left knee joint replacement surgery Z47 1     Z96 652                   Subjective: Pt reports feeling some minor improvement upon arrival      Objective: See treatment diary below    Precautions: DM2, HTN     Daily Treatment Diary      Manual                 PROM LOGN    KP  KP                                                                                                                     Exercise Diary                  Bike    10  7  10'               Quad set HEP  10  10  10               Heel slides HEP  10x10" 10x10" 10x10"               Prolonged end range stretch  HEP  NP                   Ankle pumps HEP                     Standing hamstring curls        2x10 ea               LAQ     10  10  2x10                                                                                                                                                                                                                                                                                                                                             Modalities                     NMES to L quad    10  10'  10'                Cold    8'  8'  8'                                           Assessment: Tolerated treatment fair  Patient demonstrated fatigue post treatment and would benefit from continued PT  Pt is overly eager to progress, requires reminders for proper penelope and form during exercise  Pt has difficulty with quad sets, notes most pain with quad set  Pt  1:1 with PTA until 1102, on ice for remainder  Plan: Continue per plan of care

## 2018-07-05 NOTE — PROGRESS NOTES
Subjective; Two week postop visit, status post left total knee replacement  He is accompanied by female individual  He is pleased with his range of motion thus far  He is out of medication and has been limited on the amount of medication provided for him due to his insurer  Past Medical History:   Diagnosis Date    Diabetes mellitus ([address] )     Gout     Hyperlipidemia     Hypertension        Past Surgical History:   Procedure Laterality Date    COLONOSCOPY      GALLBLADDER SURGERY      HEMORRHOID SURGERY      [place] TOTAL KNEE ARTHROPLASTY Left 6/20/2018    Procedure: KNEE : COMPLETE REPLACEMENT;  Surgeon: Mariella Hyde MD;  Location: [facility];  Service: Orthopedics       Family History   Problem Relation Age of Onset    Diabetes Mother        Social History   Substance Use Topics    Smoking status: Never Smoker    Smokeless tobacco: Never Used    Alcohol use Yes      Comment: social      Exam;    Is staple line is clinically intact and allows for the removal of the staples today and application of Steri-Strips  He has virtually no unilateral calf swelling he has a soft palpable calf and negative Homans test  Exhibits an extension lag of 10° flexion is performed well ,   past 90°  He exhibits no mid flexion arc instability    Impression; Two week postop appointment left total knee replacement    Plan; He was given a joint recipient identification card  He was provided dental antibiotic prophylaxis  Staples were removed Steri-Strips applied  Continue the Lovenox DVT prophylaxis  Continue physical therapy with the emphasis on extension and this was outlined to the gentleman and the lady in the office  His medications were reviewed with his insure prior authorization line  At their directive in instructions these were filled on his behalf  We will see him in follow-up in 4 additional weeks  His experience was supervised by the attending surgeon as well as the directive , and plan

## 2018-07-09 ENCOUNTER — OFFICE VISIT (OUTPATIENT)
Dept: PHYSICAL THERAPY | Facility: REHABILITATION | Age: 63
End: 2018-07-09
Payer: COMMERCIAL

## 2018-07-09 DIAGNOSIS — G89.29 CHRONIC PAIN OF LEFT KNEE: Primary | ICD-10-CM

## 2018-07-09 DIAGNOSIS — Z47.1 AFTERCARE FOLLOWING LEFT KNEE JOINT REPLACEMENT SURGERY: ICD-10-CM

## 2018-07-09 DIAGNOSIS — Z96.652 AFTERCARE FOLLOWING LEFT KNEE JOINT REPLACEMENT SURGERY: ICD-10-CM

## 2018-07-09 DIAGNOSIS — M25.562 CHRONIC PAIN OF LEFT KNEE: Primary | ICD-10-CM

## 2018-07-09 PROCEDURE — 97140 MANUAL THERAPY 1/> REGIONS: CPT

## 2018-07-09 PROCEDURE — 97014 ELECTRIC STIMULATION THERAPY: CPT

## 2018-07-09 PROCEDURE — 97110 THERAPEUTIC EXERCISES: CPT

## 2018-07-09 PROCEDURE — G0283 ELEC STIM OTHER THAN WOUND: HCPCS

## 2018-07-09 PROCEDURE — 97112 NEUROMUSCULAR REEDUCATION: CPT

## 2018-07-09 PROCEDURE — 97116 GAIT TRAINING THERAPY: CPT

## 2018-07-09 NOTE — PROGRESS NOTES
Daily Note     Today's date: 2018  Patient name: Rayray Rivero  : 1955  MRN: [id number]  Referring provider: Laverne Azul MD  Dx:   Encounter Diagnosis     ICD-10-CM    1  Chronic pain of left knee M25 562     G89 29    2  Aftercare following left knee joint replacement surgery Z47 1     Z96 652                   Subjective: Pt reports increasing ambulation with SPC at home, states that pain is decreased but still present  Objective: See treatment diary below    Precautions: DM2, HTN     Daily Treatment Diary      Manual               PROM LONG    KP  KP  KP                                                                                                                   Exercise Diary                Bike    10  7  10'  10'             Quad set HEP  10  10  10  10             Heel slides HEP  10x10" 10x10" 10x10"  10x10"             Prolonged end range stretch  HEP  NP                   Ankle pumps HEP                     Standing hamstring curls        2x10 ea  2x10 ea             LAQ     10  10  2x10  2x10             Gait training SPC          1 lap                                                                                                                                                                                                                                                                                                                   Modalities                   NMES to L quad    10  10'  10'  10'              Cold    8'  8'  8'  8'                                         Assessment: Tolerated treatment fair  Patient demonstrated fatigue post treatment and would benefit from continued PT  Pt showing some improvement with quad set this session  Assessed SPC and performed gait training with pt, responded well  Pt  able to complete all exercises with no increase in pain during or after session    Pt  1:1 with PTA for entirety  Plan: Continue per plan of care

## 2018-07-12 ENCOUNTER — OFFICE VISIT (OUTPATIENT)
Dept: PHYSICAL THERAPY | Facility: REHABILITATION | Age: 63
End: 2018-07-12
Payer: COMMERCIAL

## 2018-07-12 DIAGNOSIS — Z96.652 AFTERCARE FOLLOWING LEFT KNEE JOINT REPLACEMENT SURGERY: ICD-10-CM

## 2018-07-12 DIAGNOSIS — G89.29 CHRONIC PAIN OF LEFT KNEE: Primary | ICD-10-CM

## 2018-07-12 DIAGNOSIS — Z47.1 AFTERCARE FOLLOWING LEFT KNEE JOINT REPLACEMENT SURGERY: ICD-10-CM

## 2018-07-12 DIAGNOSIS — M25.562 CHRONIC PAIN OF LEFT KNEE: Primary | ICD-10-CM

## 2018-07-12 PROCEDURE — 97110 THERAPEUTIC EXERCISES: CPT | Performed by: PHYSICAL THERAPIST

## 2018-07-12 PROCEDURE — 97530 THERAPEUTIC ACTIVITIES: CPT | Performed by: PHYSICAL THERAPIST

## 2018-07-12 NOTE — PROGRESS NOTES
Daily Note     Today's date: 2018  Patient name: Lady Gonzalez  : 1955  MRN: [id number]  Referring provider: Deep Chung MD  Dx:   Encounter Diagnosis     ICD-10-CM    1  Chronic pain of left knee M25 562     G89 29    2  Aftercare following left knee joint replacement surgery Z47 1     Z96 652                   Subjective: Pt reports that he continues to have pain but has been able to increase his ambulation  Objective: See treatment diary below    Precautions: DM2, HTN     Daily Treatment Diary      Manual             PROM LONG    KP  KP  KP  not needed                                                                                                                 Exercise Diary              Bike    10  7  10'  10'  10'           Quad set HEP  10  10  10  10  dc           Heel slides HEP  10x10" 10x10" 10x10"  10x10"  dc           Prolonged end range stretch  HEP  NP        5# 6'           Ankle pumps HEP          dc           Standing hamstring curls        2x10 ea  2x10 ea  2x10            LAQ     10  10  2x10  2x10  2x10            Gait training SPC          1 lap  NP            SAQ            2x10            step ups            4' 2x10           Lateral step ups              4' 2x10                                                                                                                                                                                                                                         Modalities                 NMES to L quad    10  10'  10'  10'  dc            Cold    8'  8'  8'  8'  10'                                       Assessment: Tolerated treatment fair  Patient demonstrated fatigue post treatment and would benefit from continued PT  Pt presents with flexion to 120 degrees but continues to have decreased terminal knee extension   Prolonged stretch initiated today with fair tolerance  Improved quad contraction noted  Pt was able to perform progressed functional strengthening today without complaint  Pt tx 1:1 with PT LONG from start of session until 10:40am, 1:1 with PT TP for the remainder of the session  Plan: Continue per plan of care

## 2018-07-16 ENCOUNTER — OFFICE VISIT (OUTPATIENT)
Dept: PHYSICAL THERAPY | Facility: REHABILITATION | Age: 63
End: 2018-07-16
Payer: COMMERCIAL

## 2018-07-16 DIAGNOSIS — G89.29 CHRONIC PAIN OF LEFT KNEE: Primary | ICD-10-CM

## 2018-07-16 DIAGNOSIS — M25.562 CHRONIC PAIN OF LEFT KNEE: Primary | ICD-10-CM

## 2018-07-16 DIAGNOSIS — Z47.1 AFTERCARE FOLLOWING LEFT KNEE JOINT REPLACEMENT SURGERY: ICD-10-CM

## 2018-07-16 DIAGNOSIS — Z96.652 AFTERCARE FOLLOWING LEFT KNEE JOINT REPLACEMENT SURGERY: ICD-10-CM

## 2018-07-16 PROCEDURE — 97110 THERAPEUTIC EXERCISES: CPT

## 2018-07-16 PROCEDURE — G8991 OTHER PT/OT GOAL STATUS: HCPCS

## 2018-07-16 PROCEDURE — G8990 OTHER PT/OT CURRENT STATUS: HCPCS

## 2018-07-16 PROCEDURE — 97112 NEUROMUSCULAR REEDUCATION: CPT

## 2018-07-16 PROCEDURE — 97530 THERAPEUTIC ACTIVITIES: CPT

## 2018-07-16 NOTE — PROGRESS NOTES
Daily Note     Today's date: 2018  Patient name: Bryanna Mayr  : 1955  MRN: [id number]  Referring provider: Millicent Garcia MD  Dx:   Encounter Diagnosis     ICD-10-CM    1  Chronic pain of left knee M25 562     G89 29    2  Aftercare following left knee joint replacement surgery Z47 1     Z96 652                   Subjective: Pt reports that he has continued pain in back of knee, says range of motion and extension feels like it's improving overall  Objective: See treatment diary below      Precautions: DM2, HTN     Daily Treatment Diary      Manual           PROM LONG    KP  KP  KP  not needed  np                                                                                                               Exercise Diary            Bike    10  7  10'  10'  10'  10'         Prolonged end range stretch  HEP  NP        5# 6'  5# 6'         Standing hamstring curls        2x10 ea  2x10 ea  2x10   2x10 ea         LAQ     10  10  2x10  2x10  2x10   2x10         Gait training SPC          1 lap  NP  np          SAQ            2x10  2x10          step ups            4' 2x10 4" 2x10         Lateral step ups              4' 2x10  4" 2x10                                                                                                                                                                                                                                       Modalities               Cold    8'  8'  8'  8'  10'  10'                                       Assessment: Tolerated treatment fair  Patient demonstrated fatigue post treatment and would benefit from continued PT  Pt showing improvement with LAQ and SAQ, improved tolerance to extension stretch, though still has soreness with it  Pt had minor bleeding from incision site in shower this morning, wound looks to be healing well     Pt  able to complete all exercises with no increase in pain during or after session  Pt  1:1 with PTA for entirety  Plan: Continue per plan of care

## 2018-07-19 ENCOUNTER — OFFICE VISIT (OUTPATIENT)
Dept: PHYSICAL THERAPY | Facility: REHABILITATION | Age: 63
End: 2018-07-19
Payer: COMMERCIAL

## 2018-07-19 DIAGNOSIS — Z47.1 AFTERCARE FOLLOWING LEFT KNEE JOINT REPLACEMENT SURGERY: ICD-10-CM

## 2018-07-19 DIAGNOSIS — G89.29 CHRONIC PAIN OF LEFT KNEE: Primary | ICD-10-CM

## 2018-07-19 DIAGNOSIS — M25.562 CHRONIC PAIN OF LEFT KNEE: Primary | ICD-10-CM

## 2018-07-19 DIAGNOSIS — Z96.652 AFTERCARE FOLLOWING LEFT KNEE JOINT REPLACEMENT SURGERY: ICD-10-CM

## 2018-07-19 PROCEDURE — 97112 NEUROMUSCULAR REEDUCATION: CPT | Performed by: PHYSICAL THERAPIST

## 2018-07-19 PROCEDURE — 97530 THERAPEUTIC ACTIVITIES: CPT | Performed by: PHYSICAL THERAPIST

## 2018-07-19 PROCEDURE — 97110 THERAPEUTIC EXERCISES: CPT | Performed by: PHYSICAL THERAPIST

## 2018-07-19 NOTE — PROGRESS NOTES
Daily Note     Today's date: 2018  Patient name: Lady Gonzalez  : 1955  MRN: [id number]  Referring provider: Deep Chung MD  Dx:   Encounter Diagnosis     ICD-10-CM    1  Chronic pain of left knee M25 562     G89 29    2  Aftercare following left knee joint replacement surgery Z47 1     Z96 652                   Subjective: Pt reports that he is starting to feel "much better "     Objective: See treatment diary below      Precautions: DM2, HTN     Daily Treatment Diary      Manual           PROM LONG    KP  KP  KP  not needed  np                                                                                                               Exercise Diary          Bike    10  7  10'  10'  10'  10'  10       Prolonged end range stretch  HEP  NP        5# 6'  5# 6'  5# 7'       Standing hamstring curls        2x10 ea  2x10 ea  2x10   2x10 ea  2x15 each       LAQ     10  10  2x10  2x10  2x10   2x10  2x10       Gait training SPC          1 lap  NP  np  dc        SAQ            2x10  2x10  2x10        step ups            4' 2x10 4" 2x10  6" 2x10       Lateral step ups              4' 2x10  4" 2x10  6" 2x10       sit to stands                         Standing TKE                        slant board                3x30"                                                                                                                                                             Modalities             Cold    8'  8'  8'  8'  10'  10' 7'                                     Assessment: Tolerated treatment fair  Patient demonstrated fatigue post treatment and would benefit from continued PT  Incision clean and dry this visit  Pt continues to show increased flexion and was advised not to continue stretching into flexion at this time  Pt was able to increase step height with fair form   Pt tx 1:1 with PT LONG from 10:00am to 10:53am  Ice for remainder of the session  Plan: Continue per plan of care

## 2018-07-23 ENCOUNTER — OFFICE VISIT (OUTPATIENT)
Dept: PHYSICAL THERAPY | Facility: REHABILITATION | Age: 63
End: 2018-07-23
Payer: COMMERCIAL

## 2018-07-23 DIAGNOSIS — M25.562 CHRONIC PAIN OF LEFT KNEE: Primary | ICD-10-CM

## 2018-07-23 DIAGNOSIS — Z47.1 AFTERCARE FOLLOWING LEFT KNEE JOINT REPLACEMENT SURGERY: ICD-10-CM

## 2018-07-23 DIAGNOSIS — Z96.652 AFTERCARE FOLLOWING LEFT KNEE JOINT REPLACEMENT SURGERY: ICD-10-CM

## 2018-07-23 DIAGNOSIS — G89.29 CHRONIC PAIN OF LEFT KNEE: Primary | ICD-10-CM

## 2018-07-23 PROCEDURE — 97110 THERAPEUTIC EXERCISES: CPT

## 2018-07-23 PROCEDURE — 97112 NEUROMUSCULAR REEDUCATION: CPT

## 2018-07-23 PROCEDURE — 97530 THERAPEUTIC ACTIVITIES: CPT

## 2018-07-23 RX ORDER — FLASH GLUCOSE SENSOR
KIT MISCELLANEOUS
Refills: 3 | COMMUNITY
Start: 2018-07-12

## 2018-07-23 NOTE — PROGRESS NOTES
Daily Note     Today's date: 2018  Patient name: Linda La  : 1955  MRN: [id number]  Referring provider: Mark Mina MD  Dx:   Encounter Diagnosis     ICD-10-CM    1  Chronic pain of left knee M25 562     G89 29    2  Aftercare following left knee joint replacement surgery Z47 1     Z96 652                   Subjective: Pt notes improvement with ROM on knee, states he is still having issues with pain when sleeping  Objective: See treatment diary below      Precautions: DM2, HTN     Daily Treatment Diary      Manual         PROM LONG    KP  KP  KP  not needed  np  np                                                                                                             Exercise Diary        Bike    10  7  10'  10'  10'  10'  10  10     Prolonged end range stretch  HEP  NP        5# 6'  5# 6'  5# 7'  5# 7'     Standing hamstring curls        2x10 ea  2x10 ea  2x10   2x10 ea  2x15 each  2x15 ea     LAQ     10  10  2x10  2x10  2x10   2x10  2x10  2x15      SAQ            2x10  2x10  2x10  2x15      step ups            4' 2x10 4" 2x10  6" 2x10  6" 2x10     Lateral step ups              4' 2x10  4" 2x10  6" 2x10  6" 2x10     sit to stands                         Standing TKE                        slant board                3x30"  3x30"                                                                                                                                                           Modalities           Cold    8'  8'  8'  8'  10'  10' 7'  10'                                     Assessment: Tolerated treatment well  Patient would benefit from continued PT  Pt required some cuing to avoid hip compensation with lateral step ups  Pt still lacking in TKE, but is showing improvement    Pt showing improvement with ambulation and strength, though deficits are still present  Pt 1:1 with PTA until 1045, ice and IEP for remainder  Plan: Continue per plan of care

## 2018-07-24 ENCOUNTER — OFFICE VISIT (OUTPATIENT)
Dept: OBGYN CLINIC | Facility: HOSPITAL | Age: 63
End: 2018-07-24

## 2018-07-24 VITALS
DIASTOLIC BLOOD PRESSURE: 80 MMHG | HEIGHT: 68 IN | HEART RATE: 83 BPM | SYSTOLIC BLOOD PRESSURE: 125 MMHG | BODY MASS INDEX: 29.86 KG/M2 | WEIGHT: 197 LBS

## 2018-07-24 DIAGNOSIS — Z96.652 AFTERCARE FOLLOWING LEFT KNEE JOINT REPLACEMENT SURGERY: Primary | ICD-10-CM

## 2018-07-24 DIAGNOSIS — Z47.1 AFTERCARE FOLLOWING LEFT KNEE JOINT REPLACEMENT SURGERY: Primary | ICD-10-CM

## 2018-07-24 PROCEDURE — 99024 POSTOP FOLLOW-UP VISIT: CPT | Performed by: ORTHOPAEDIC SURGERY

## 2018-07-24 NOTE — PROGRESS NOTES
Six weeks following left total knee replacement, this patient has a number of concerns including presence of pain, presence swelling, incomplete extension  This occurred despite a gentleman who receives physical therapy twice a week, and does exercises regularly on his own  Exam confirms gait pattern with very little antalgia  Left hip moves well  Left thigh has atrophy  Left knee has a healed anterior incision  There is very little warmth  There is a modest effusion  Extension is within 2° of full, flexion is 125°  There is no mid flexion valgus instability, there is no tenderness in the left calf  Assessment/plan: This gentleman 6 weeks following left total knee replacement upon up the positives in his exam today  He lacks only a few degrees extension which would read be readily achievable with more therapy, he lacks and strengthen his quadriceps which could be readily improved upon with continue therapy  He will remain out of work at my direction for the next 4 weeks    I would welcome the opportunity see him in 4 weeks time, this include x-rays two views left knee upon arrival   Should everything in be agreeable, he would be considered a candidate for return to work at that visit

## 2018-07-24 NOTE — LETTER
July 24, 2018     Patient: Jae Valverde   YOB: 1955   Date of Visit: 7/24/2018       To Whom it May Concern:    Emilie Olsen is under my professional care  He was seen in my office on 7/24/2018  He will remain out of work at my direction for the next 4 weeks  He requires more therapy to restore strength and support to his left knee    If you have any questions or concerns, please don't hesitate to call           Sincerely,          Abiel Espinal MD        CC: Jae Valverde

## 2018-07-26 ENCOUNTER — OFFICE VISIT (OUTPATIENT)
Dept: PHYSICAL THERAPY | Facility: REHABILITATION | Age: 63
End: 2018-07-26
Payer: COMMERCIAL

## 2018-07-26 DIAGNOSIS — G89.29 CHRONIC PAIN OF LEFT KNEE: Primary | ICD-10-CM

## 2018-07-26 DIAGNOSIS — M25.562 CHRONIC PAIN OF LEFT KNEE: Primary | ICD-10-CM

## 2018-07-26 DIAGNOSIS — Z96.652 AFTERCARE FOLLOWING LEFT KNEE JOINT REPLACEMENT SURGERY: ICD-10-CM

## 2018-07-26 DIAGNOSIS — Z47.1 AFTERCARE FOLLOWING LEFT KNEE JOINT REPLACEMENT SURGERY: ICD-10-CM

## 2018-07-26 PROCEDURE — 97112 NEUROMUSCULAR REEDUCATION: CPT

## 2018-07-26 PROCEDURE — 97530 THERAPEUTIC ACTIVITIES: CPT

## 2018-07-26 PROCEDURE — 97110 THERAPEUTIC EXERCISES: CPT

## 2018-07-26 NOTE — PROGRESS NOTES
Daily Note     Today's date: 2018  Patient name: Lady Gonzalez  : 1955  MRN: [id number]  Referring provider: Deep Chung MD  Dx:   Encounter Diagnosis     ICD-10-CM    1  Chronic pain of left knee M25 562     G89 29    2  Aftercare following left knee joint replacement surgery Z47 1     Z96 652                   Subjective: Pt reports that he had dr appointment on Tues and it went well, said he was pleased with progress  Pt reports feeling good in knee upon arrival today  Objective: See treatment diary below      Precautions: DM2, HTN     Daily Treatment Diary      Manual       PROM LONG    KP  KP  KP  not needed  np  np  np                                                                                                           Exercise Diary      Bike    10  7  10'  10'  10'  10'  10  10  10   Prolonged end range stretch  HEP  NP        5# 6'  5# 6'  5# 7'  5# 7'  5# 7'   Standing hamstring curls        2x10 ea  2x10 ea  2x10   2x10 ea  2x15 each  2x15 ea  2x15 ea   LAQ     10  10  2x10  2x10  2x10   2x10  2x10  2x15  2x15    SAQ            2x10  2x10  2x10  2x15  2x15    step ups            4' 2x10 4" 2x10  6" 2x10  6" 2x10  6" 2x10   Lateral step ups              4' 2x10  4" 2x10  6" 2x10  6" 2x10  6" 2x10   sit to stands                         Standing TKE                        slant board                3x30"  3x30"  3x30"                                                                                                                                                         Modalities         Cold    8'  8'  8'  8'  10'  10' 7'  10'  10'                                   Assessment: Tolerated treatment well  Patient would benefit from continued PT  Pt demonstrates increased tolerance to extension stretching    Pt still exhibits some weakness in L knee, but is showing improvement  Pt  able to complete all exercises with no increase in pain during or after session  Pt 1:1 with PTA [id number], IEP and ice for remainder  Plan: Continue per plan of care

## 2018-07-30 ENCOUNTER — OFFICE VISIT (OUTPATIENT)
Dept: PHYSICAL THERAPY | Facility: REHABILITATION | Age: 63
End: 2018-07-30
Payer: COMMERCIAL

## 2018-07-30 DIAGNOSIS — Z96.652 AFTERCARE FOLLOWING LEFT KNEE JOINT REPLACEMENT SURGERY: ICD-10-CM

## 2018-07-30 DIAGNOSIS — G89.29 CHRONIC PAIN OF LEFT KNEE: Primary | ICD-10-CM

## 2018-07-30 DIAGNOSIS — M25.562 CHRONIC PAIN OF LEFT KNEE: Primary | ICD-10-CM

## 2018-07-30 DIAGNOSIS — Z47.1 AFTERCARE FOLLOWING LEFT KNEE JOINT REPLACEMENT SURGERY: ICD-10-CM

## 2018-07-30 PROCEDURE — 97530 THERAPEUTIC ACTIVITIES: CPT | Performed by: PHYSICAL THERAPIST

## 2018-07-30 PROCEDURE — 97112 NEUROMUSCULAR REEDUCATION: CPT | Performed by: PHYSICAL THERAPIST

## 2018-07-30 PROCEDURE — 97110 THERAPEUTIC EXERCISES: CPT | Performed by: PHYSICAL THERAPIST

## 2018-07-30 NOTE — PROGRESS NOTES
Daily Note     Today's date: 2018  Patient name: Jae Valverde  : 1955  MRN: [id number]  Referring provider: Lee Elaine MD  Dx:   Encounter Diagnosis     ICD-10-CM    1  Chronic pain of left knee M25 562     G89 29    2  Aftercare following left knee joint replacement surgery Z47 1     Z96 652                   Subjective: Pt expressed frustration concerning the length of the recovery process upon arrival        Objective: See treatment diary below      Precautions: DM2, HTN     Daily Treatment Diary      Manual       PROM LONG    KP  KP  KP  not needed  np  np  np                                                                                                           Exercise Diary      Bike    10  7  10'  10'  10'  10'  10  10  10 10   Prolonged end range stretch  HEP  NP        5# 6'  5# 6'  5# 7'  5# 7'  5# 7' 5# 7'   Standing hamstring curls        2x10 ea  2x10 ea  2x10   2x10 ea  2x15 each  2x15 ea  2x15 ea 2x15 2#   LAQ     10  10  2x10  2x10  2x10   2x10  2x10  2x15  2x15 3x10 2#    SAQ            2x10  2x10  2x10  2x15  2x15 2x15    step ups            4' 2x10 4" 2x10  6" 2x10  6" 2x10  6" 2x10 6" 2x10   Lateral step ups              4' 2x10  4" 2x10  6" 2x10  6" 2x10  6" 2x10 6" 2x10   sit to stands                          Standing TKE                         slant board                3x30"  3x30"  3x30"                                                                                                                                                                Modalities         Cold    8'  8'  8'  8'  10'  10' 7'  10'  10'                                   Assessment: Tolerated treatment well  Patient would benefit from continued PT    Pt continues to show improved quad activation at end range and has improved extension post stretching  Pt able to perform TE without complaints of pain  Pt tx 1:1 with PT for entire treatment  Plan: Continue per plan of care

## 2018-08-02 ENCOUNTER — OFFICE VISIT (OUTPATIENT)
Dept: PHYSICAL THERAPY | Facility: REHABILITATION | Age: 63
End: 2018-08-02
Payer: COMMERCIAL

## 2018-08-02 DIAGNOSIS — G89.29 CHRONIC PAIN OF LEFT KNEE: Primary | ICD-10-CM

## 2018-08-02 DIAGNOSIS — M25.562 CHRONIC PAIN OF LEFT KNEE: Primary | ICD-10-CM

## 2018-08-02 DIAGNOSIS — Z47.1 AFTERCARE FOLLOWING LEFT KNEE JOINT REPLACEMENT SURGERY: ICD-10-CM

## 2018-08-02 DIAGNOSIS — Z96.652 AFTERCARE FOLLOWING LEFT KNEE JOINT REPLACEMENT SURGERY: ICD-10-CM

## 2018-08-02 PROCEDURE — 97112 NEUROMUSCULAR REEDUCATION: CPT

## 2018-08-02 PROCEDURE — 97530 THERAPEUTIC ACTIVITIES: CPT

## 2018-08-02 PROCEDURE — 97110 THERAPEUTIC EXERCISES: CPT

## 2018-08-02 NOTE — PROGRESS NOTES
Daily Note     Today's date: 2018  Patient name: Radha Singh  : 1955  MRN: [id number]  Referring provider: Florence Garcia MD  Dx:   Encounter Diagnosis     ICD-10-CM    1  Chronic pain of left knee M25 562     G89 29    2  Aftercare following left knee joint replacement surgery Z47 1     Z96 652                   Subjective: Pt reports he has reduced pain in knee, though still present  Pt reports some continued difficulty with ascending and descending steps at home  Objective: See treatment diary below      Precautions: DM2, HTN     Daily Treatment Diary      Manual       PROM LONG    KP  KP  KP  not needed  np  np  np                                                                                                           Exercise Diary         Bike  10'  10'  10  10  10 10 10'      Prolonged end range stretch   5# 6'  5# 6'  5# 7'  5# 7'  5# 7' 5# 7' 5# 7'      Standing hamstring curls  2x10   2x10 ea  2x15 each  2x15 ea  2x15 ea 2x15 2# 2x15 2#      LAQ   2x10   2x10  2x10  2x15  2x15 3x10 2# 3x10 2#       SAQ  2x10  2x10  2x10  2x15  2x15 2x15 2x15       step ups  4' 2x10 4" 2x10  6" 2x10  6" 2x10  6" 2x10 6" 2x10 6" 2x10      Lateral step ups    4' 2x10  4" 2x10  6" 2x10  6" 2x10  6" 2x10 6" 2x10 6" 2x10      sit to stands                    Standing TKE                   slant board      3x30"  3x30"  3x30"                                                                                                                                Modalities         Cold    8'  8'  8'  8'  10'  10' 7'  10'  10' 10'                                Assessment: Tolerated treatment well  Patient would benefit from continued PT  Pt showing improvement with tolerance to extension stretching  Pt improving with [address] and LAQ using weights, achieving improved quad contraction  Pt  able to complete all exercises with no increase in pain during or after session  Pt 1:1 with PTA KP [id number], PTA HS [id number], ice for remainder  Pt arrived late for session  Plan: Continue per plan of care

## 2018-08-06 ENCOUNTER — TELEPHONE (OUTPATIENT)
Dept: OBGYN CLINIC | Facility: HOSPITAL | Age: 63
End: 2018-08-06

## 2018-08-06 ENCOUNTER — OFFICE VISIT (OUTPATIENT)
Dept: PHYSICAL THERAPY | Facility: REHABILITATION | Age: 63
End: 2018-08-06
Payer: COMMERCIAL

## 2018-08-06 DIAGNOSIS — M25.562 CHRONIC PAIN OF LEFT KNEE: Primary | ICD-10-CM

## 2018-08-06 DIAGNOSIS — Z96.652 AFTERCARE FOLLOWING LEFT KNEE JOINT REPLACEMENT SURGERY: ICD-10-CM

## 2018-08-06 DIAGNOSIS — G89.29 CHRONIC PAIN OF LEFT KNEE: Primary | ICD-10-CM

## 2018-08-06 DIAGNOSIS — Z47.1 AFTERCARE FOLLOWING LEFT KNEE JOINT REPLACEMENT SURGERY: ICD-10-CM

## 2018-08-06 DIAGNOSIS — R52 PAIN: Primary | ICD-10-CM

## 2018-08-06 PROCEDURE — 97110 THERAPEUTIC EXERCISES: CPT | Performed by: PHYSICAL THERAPIST

## 2018-08-06 PROCEDURE — 97112 NEUROMUSCULAR REEDUCATION: CPT | Performed by: PHYSICAL THERAPIST

## 2018-08-06 PROCEDURE — 97530 THERAPEUTIC ACTIVITIES: CPT | Performed by: PHYSICAL THERAPIST

## 2018-08-06 RX ORDER — METHOCARBAMOL 750 MG/1
750 TABLET, FILM COATED ORAL 2 TIMES DAILY PRN
Qty: 60 TABLET | Refills: 1 | Status: SHIPPED | OUTPATIENT
Start: 2018-08-06 | End: 2019-01-10

## 2018-08-06 NOTE — PROGRESS NOTES
Daily Note     Today's date: 2018  Patient name: Bryanna Mary  : 1955  MRN: [id number]  Referring provider: Millicent Garcia MD  Dx:   Encounter Diagnosis     ICD-10-CM    1  Chronic pain of left knee M25 562     G89 29    2  Aftercare following left knee joint replacement surgery Z47 1     Z96 652                   Subjective: Pt reports that he was able to go up stairs with a reciprocal pattern for the first time this weekend  Objective: See treatment diary below      Precautions: DM2, HTN     Daily Treatment Diary      Manual       PROM LONG    KP  KP  KP  not needed  np  np  np                                                                                                           Exercise Diary        Bike  10'  10'  10  10  10 10 10' 10'     Prolonged end range stretch   5# 6'  5# 6'  5# 7'  5# 7'  5# 7' 5# 7' 5# 7' 5# 8'      Standing hamstring curls  2x10   2x10 ea  2x15 each  2x15 ea  2x15 ea 2x15 2# 2x15 2# 2x15 2#     LAQ   2x10   2x10  2x10  2x15  2x15 3x10 2# 3x10 2# 3x10 3#      SAQ  2x10  2x10  2x10  2x15  2x15 2x15 2x15 2x15      step ups  4' 2x10 4" 2x10  6" 2x10  6" 2x10  6" 2x10 6" 2x10 6" 2x10 2x15  6"     Lateral step ups    4' 2x10  4" 2x10  6" 2x10  6" 2x10  6" 2x10 6" 2x10 6" 2x10 2x15 6"      sit to stands                    Standing TKE             BTB 2x10      slant board      3x30"  3x30"  3x30"                                                                                                                                Modalities          Cold    8'  8'  8'  8'  10'  10' 7'  10'  10' 10' 10'                                   Assessment: Tolerated treatment well  Patient would benefit from continued PT  Pt was able to perform LAQ with increased weight today with good performance  Pt continues to show improved TKE   Pt tx 1:1 with LONG PT from start of session until 10:55am  Ice for remainder of session  Plan: Continue per plan of care

## 2018-08-09 ENCOUNTER — OFFICE VISIT (OUTPATIENT)
Dept: PHYSICAL THERAPY | Facility: REHABILITATION | Age: 63
End: 2018-08-09
Payer: COMMERCIAL

## 2018-08-09 DIAGNOSIS — Z96.652 AFTERCARE FOLLOWING LEFT KNEE JOINT REPLACEMENT SURGERY: ICD-10-CM

## 2018-08-09 DIAGNOSIS — M25.562 CHRONIC PAIN OF LEFT KNEE: Primary | ICD-10-CM

## 2018-08-09 DIAGNOSIS — Z47.1 AFTERCARE FOLLOWING LEFT KNEE JOINT REPLACEMENT SURGERY: ICD-10-CM

## 2018-08-09 DIAGNOSIS — G89.29 CHRONIC PAIN OF LEFT KNEE: Primary | ICD-10-CM

## 2018-08-09 PROCEDURE — 97110 THERAPEUTIC EXERCISES: CPT

## 2018-08-09 PROCEDURE — 97112 NEUROMUSCULAR REEDUCATION: CPT

## 2018-08-09 NOTE — PROGRESS NOTES
Daily Note     Today's date: 2018  Patient name: Gricelda Hernández  : 1955  MRN: [id number]  Referring provider: Ector Phoenix MD  Dx:   Encounter Diagnosis     ICD-10-CM    1  Chronic pain of left knee M25 562     G89 29    2  Aftercare following left knee joint replacement surgery Z47 1     Z96 652                   Subjective: Pt reports elevated pain in L knee upon arrival, states it has been occurring for past 2 days  Objective: See treatment diary below      Precautions: DM2, HTN     Daily Treatment Diary      Manual       PROM LONG    KP  KP  KP  not needed  np  np  np                                                                                                           Exercise Diary       Bike  10'  10'  10  10  10 10 10' 10' 10'    Prolonged end range stretch   5# 6'  5# 6'  5# 7'  5# 7'  5# 7' 5# 7' 5# 7' 5# 8'  5# 8'    Standing hamstring curls  2x10   2x10 ea  2x15 each  2x15 ea  2x15 ea 2x15 2# 2x15 2# 2x15 2# 2x15 2#    LAQ   2x10   2x10  2x10  2x15  2x15 3x10 2# 3x10 2# 3x10 3# 3x10 3#     SAQ  2x10  2x10  2x10  2x15  2x15 2x15 2x15 2x15 2x15     step ups  4' 2x10 4" 2x10  6" 2x10  6" 2x10  6" 2x10 6" 2x10 6" 2x10 2x15  6" 2x15 6"    Lateral step ups    4' 2x10  4" 2x10  6" 2x10  6" 2x10  6" 2x10 6" 2x10 6" 2x10 2x15 6"  2x15 6"    sit to stands                    Standing TKE             BTB 2x10 BTB 2x10     slant board      3x30"  3x30"  3x30"                                                                                                                                Modalities         Cold    8'  8'  8'  8'  10'  10' 7'  10'  10' 10' 10' 10'                                  Assessment: Tolerated treatment fair  Patient demonstrated fatigue post treatment and would benefit from continued PT    Pt states he has one visit remaining  Pt noted some relief at end of session  Pt showing some improvement with TKE  Pt  able to complete all exercises with no increase in pain during or after session  Pt 1:1 with PTA [id number], IEP and ice for remainder  Pt arrived late for session  Plan: Continue per plan of care

## 2018-08-13 ENCOUNTER — EVALUATION (OUTPATIENT)
Dept: PHYSICAL THERAPY | Facility: REHABILITATION | Age: 63
End: 2018-08-13
Payer: COMMERCIAL

## 2018-08-13 DIAGNOSIS — M25.562 CHRONIC PAIN OF LEFT KNEE: Primary | ICD-10-CM

## 2018-08-13 DIAGNOSIS — G89.29 CHRONIC PAIN OF LEFT KNEE: Primary | ICD-10-CM

## 2018-08-13 DIAGNOSIS — Z47.1 AFTERCARE FOLLOWING LEFT KNEE JOINT REPLACEMENT SURGERY: ICD-10-CM

## 2018-08-13 DIAGNOSIS — Z96.652 AFTERCARE FOLLOWING LEFT KNEE JOINT REPLACEMENT SURGERY: ICD-10-CM

## 2018-08-13 PROCEDURE — G8992 OTHER PT/OT  D/C STATUS: HCPCS | Performed by: PHYSICAL THERAPIST

## 2018-08-13 PROCEDURE — 97110 THERAPEUTIC EXERCISES: CPT | Performed by: PHYSICAL THERAPIST

## 2018-08-13 PROCEDURE — G8991 OTHER PT/OT GOAL STATUS: HCPCS | Performed by: PHYSICAL THERAPIST

## 2018-08-13 PROCEDURE — 97112 NEUROMUSCULAR REEDUCATION: CPT | Performed by: PHYSICAL THERAPIST

## 2018-08-13 NOTE — PROGRESS NOTES
PT Re-Evaluation  and PT Discharge    Today's date: 2018  Patient name: Dee Toscano  : 1955  MRN: [id number]  Referring provider: Evangelista Guardado MD  Dx:   Encounter Diagnosis     ICD-10-CM    1  Chronic pain of left knee M25 562     G89 29    2  Aftercare following left knee joint replacement surgery Z47 1     Z96 652                   Assessment  Impairments: pain with function and weight-bearing intolerance    Assessment details: Dee Toscano is a 63 y o  male who has been attending physical therapy s/p TKA  Pt has made significant progress and currently presents with 5/5 strength in the knee and improved functional mobility  Despite gains pt continues to present with decreased terminal knee extension  In spite of remaining defecit pt wishes to complete this coarse of therapy secondary to finacnail and time concerns  Pt was educated on the importance of attaining terminal knee extension for painfree ambulation  Pt stated understanding of this concept and was able to verbalize an appropraite home program to address this defecit  Pt discharged to [facility] at this time  Understanding of Dx/Px/POC: good   Prognosis: good  Prognosis details: Short Term  1: Patient will report a 50% decrease in pain in 2-4 weeks  - MET  2: Pt will have knee ROM 0-120 degrees in - Partially met   3  Pt will have knee strength 5/5 in both directions in 6 weeks  - MET  Long Term  1: Patient will demonstrate independence in home exercise program by discharge  - MET  2: Patient will have FOTO score of 54 indicating improved function in 8 weeks   - MET    Plan  Patient would benefit from: skilled physical therapy  Planned therapy interventions: neuromuscular re-education, therapeutic exercise, strengthening, stretching and manual therapy  Treatment plan discussed with: patient        Subjective Evaluation    History of Present Illness  Mechanism of injury: Pt reports that he is apprehensive about continuing PT secondary to financial restrictions  States he is functionally able to complete all activity but reports he continues to have pain while walking and at night     Pain  No pain reported  At best pain ratin  At worst pain ratin          Objective     Active Range of Motion   Left Knee   Flexion: 155 degrees   Extension: 0 degrees     Right Knee   Flexion: 120 degrees   Extension: -15 degrees     Passive Range of Motion     Right Knee   Flexion: 120 degrees   Extension: -15 degrees     Strength/Myotome Testing     Left Knee   Flexion: 5  Extension: 5    Right Knee   Flexion: 5  Extension: 5    General Comments     Knee Comments  TU seconds (pre- op)  TUG post op: 8 seconds (was 32 seconds)          Precautions: DM2, HTN       Daily Treatment Diary      Manual       PROM LONG    KP  KP  KP  not needed  np  np  np                                                                                                           Exercise Diary      Bike  10'  10'  10  10  10 10 10' 10' 10'  10'   Prolonged end range stretch   5# 6'  5# 6'  5# 7'  5# 7'  5# 7' 5# 7' 5# 7' 5# 8'  5# 8'  7# 8'   Standing hamstring curls  2x10   2x10 ea  2x15 each  2x15 ea  2x15 ea 2x15 2# 2x15 2# 2x15 2# 2x15 2#     LAQ   2x10   2x10  2x10  2x15  2x15 3x10 2# 3x10 2# 3x10 3# 3x10 3#  3x10 3#    SAQ  2x10  2x10  2x10  2x15  2x15 2x15 2x15 2x15 2x15  2x15    step ups  4' 2x10 4" 2x10  6" 2x10  6" 2x10  6" 2x10 6" 2x10 6" 2x10 2x15  6" 2x15 6"     Lateral step ups    4' 2x10  4" 2x10  6" 2x10  6" 2x10  6" 2x10 6" 2x10 6" 2x10 2x15 6"  2x15 6"     sit to stands                         Standing TKE               BTB 2x10 BTB 2x10      slant board      3x30"  3x30"  3x30"                                                                                                                                                                   Modalities       7/9 7/12 7/16 7/19 7/23 7/26 8/2 8/6 8/9    Cold    8'  8'  8'  8'  10'  10' 7'  10'  10' 10' 10' 10'                                    Pt tx 1:1 with PT LONG from start of session until 10:45am

## 2018-08-16 ENCOUNTER — APPOINTMENT (OUTPATIENT)
Dept: PHYSICAL THERAPY | Facility: REHABILITATION | Age: 63
End: 2018-08-16
Payer: COMMERCIAL

## 2018-08-20 ENCOUNTER — APPOINTMENT (OUTPATIENT)
Dept: PHYSICAL THERAPY | Facility: REHABILITATION | Age: 63
End: 2018-08-20
Payer: COMMERCIAL

## 2018-08-23 ENCOUNTER — OFFICE VISIT (OUTPATIENT)
Dept: OBGYN CLINIC | Facility: HOSPITAL | Age: 63
End: 2018-08-23

## 2018-08-23 ENCOUNTER — APPOINTMENT (OUTPATIENT)
Dept: PHYSICAL THERAPY | Facility: REHABILITATION | Age: 63
End: 2018-08-23
Payer: COMMERCIAL

## 2018-08-23 VITALS
DIASTOLIC BLOOD PRESSURE: 74 MMHG | WEIGHT: 188.4 LBS | SYSTOLIC BLOOD PRESSURE: 113 MMHG | HEIGHT: 68 IN | BODY MASS INDEX: 28.55 KG/M2 | HEART RATE: 84 BPM

## 2018-08-23 DIAGNOSIS — Z47.1 AFTERCARE FOLLOWING LEFT KNEE JOINT REPLACEMENT SURGERY: Primary | ICD-10-CM

## 2018-08-23 DIAGNOSIS — Z96.652 AFTERCARE FOLLOWING LEFT KNEE JOINT REPLACEMENT SURGERY: Primary | ICD-10-CM

## 2018-08-23 PROCEDURE — 99024 POSTOP FOLLOW-UP VISIT: CPT | Performed by: ORTHOPAEDIC SURGERY

## 2018-08-23 NOTE — LETTER
August 23, 2018     Patient: Valencia Underwood   YOB: 1955   Date of Visit: 8/23/2018       To Whom it May Concern:    Terryl Mortimer is under my professional care  He was seen in my office on 8/23/2018  He may return to full duty on 8/24/18  If you have any questions or concerns, please don't hesitate to call           Sincerely,          Nadine Treadwell MD        CC: No Recipients

## 2018-08-23 NOTE — PROGRESS NOTES
Assessment:  1  Aftercare following left knee joint replacement surgery         Plan:  Left TKA 6/20/18  Continue HEP  Work on Strengthening   Instructed in scar massage      To do next visit:  Return in about 1 month (around 9/23/2018) for with x-rays  Scribe Attestation    I,:   Shashi Dan am acting as a scribe while in the presence of the attending physician :        I,:   David Wheatley MD personally performed the services described in this documentation    as scribed in my presence :              Subjective:   Ella Marshall is a 63 y o  male who presents s/p left TKA done 6/20/18  Patient has been attending PT as instructed  Review of systems negative unless otherwise specified in HPI      Past Medical History:   Diagnosis Date    Diabetes mellitus ([address] )     Gout     Hyperlipidemia     Hypertension        Past Surgical History:   Procedure Laterality Date    COLONOSCOPY      GALLBLADDER SURGERY      HEMORRHOID SURGERY      ID TOTAL KNEE ARTHROPLASTY Left 6/20/2018    Procedure: KNEE : COMPLETE REPLACEMENT;  Surgeon: David Wheatley MD;  Location: [facility];  Service: Orthopedics       Family History   Problem Relation Age of Onset    Diabetes Mother        Social History     Occupational History    Not on file       Social History Main Topics    Smoking status: Never Smoker    Smokeless tobacco: Never Used    Alcohol use Yes      Comment: social     Drug use: No    Sexual activity: Not on file         Current Outpatient Prescriptions:     allopurinol (ZYLOPRIM) 100 mg tablet, TK 1 T PO  BID, Disp: , Rfl: 3    amlodipine-olmesartan (KALIN) 10-40 MG, TK 1 T PO QD, Disp: , Rfl: 5    ascorbic Acid (VITAMIN C) 500 MG CPCR, Take 1 capsule (500 mg total) by mouth daily, Disp: 30 capsule, Rfl: 0    aspirin (ECOTRIN LOW STRENGTH) 81 mg EC tablet, Take 81 mg by mouth every other day  , Disp: , Rfl:     carvedilol (COREG) 25 mg tablet, TK 1 T PO BID WITH FOOD, Disp: , Rfl: 6   Continuous Blood Gluc  (FREESTYLE YEIMY READER) MARQUISE, USE AND APPLY SENSOR EVERY 10 DAYS, Disp: , Rfl: 3    Continuous Blood Gluc Sensor (FREESTYLE YEIMY SENSOR SYSTEM) [facility], APPLY 1 SENSOR EVERY 10 DAYS, Disp: , Rfl: 3    docusate sodium (COLACE) 100 mg capsule, Take 1 capsule (100 mg total) by mouth 2 (two) times a day, Disp: 10 capsule, Rfl: 0    enoxaparin (LOVENOX) 40 mg/0 4 mL, Inject 0 4 mL (40 mg total) under the skin daily for 28 days, Disp: 11 2 mL, Rfl: 0    ferrous sulfate 324 (65 Fe) mg, Take 1 tablet (324 mg total) by mouth 2 (two) times a day before meals, Disp: 60 tablet, Rfl: 0    folic acid (FOLVITE) 360 mcg tablet, Take 1 tablet (400 mcg total) by mouth daily, Disp: 30 tablet, Rfl: 0    furosemide (LASIX) 20 mg tablet, TK 1 T PO  D, Disp: , Rfl: 5    metFORMIN (GLUCOPHAGE) 500 mg tablet, 1 tab po daily, Disp: , Rfl: 5    methocarbamol (ROBAXIN) 750 mg tablet, Take 1 tablet (750 mg total) by mouth 2 (two) times a day as needed for muscle spasms for up to 60 doses, Disp: 60 tablet, Rfl: 1    naproxen (NAPROSYN) 500 mg tablet, TK 1 T PO  D AFTER FOOD  prn, Disp: , Rfl: 1    oxyCODONE (OxyCONTIN) 10 mg 12 hr tablet, Take 1/2 pill q 12 hours p r n , Disp: 42 tablet, Rfl: 0    simvastatin (ZOCOR) 20 mg tablet, TK 1 T PO QD IN THE EVENING AFTER SUPPER, Disp: , Rfl: 2    Allergies   Allergen Reactions    Acetaminophen Hives            Vitals:    08/23/18 1531   BP: 113/74   Pulse: 84       Objective:          Physical Exam                    Left Knee Exam     Tenderness   The patient is experiencing no tenderness  Range of Motion   Left knee extension: 3     Flexion: 100     Tests   Varus: negative  Valgus: negative    Other   Erythema: absent  Scars: present (keloid scar)  Sensation: normal  Pulse: present  Swelling: mild  Effusion: effusion present            Diagnostics, reviewed and taken today if performed as documented:    None performed        Procedures, if performed today:  Procedures    None performed

## 2018-08-27 ENCOUNTER — APPOINTMENT (OUTPATIENT)
Dept: PHYSICAL THERAPY | Facility: REHABILITATION | Age: 63
End: 2018-08-27
Payer: COMMERCIAL

## 2018-08-30 ENCOUNTER — APPOINTMENT (OUTPATIENT)
Dept: PHYSICAL THERAPY | Facility: REHABILITATION | Age: 63
End: 2018-08-30
Payer: COMMERCIAL

## 2018-10-04 ENCOUNTER — HOSPITAL ENCOUNTER (OUTPATIENT)
Dept: RADIOLOGY | Facility: HOSPITAL | Age: 63
Discharge: HOME | End: 2018-10-04
Attending: ORTHOPAEDIC SURGERY
Payer: COMMERCIAL

## 2018-10-04 VITALS
WEIGHT: 190.8 LBS | BODY MASS INDEX: 28.92 KG/M2 | SYSTOLIC BLOOD PRESSURE: 144 MMHG | HEART RATE: 73 BPM | DIASTOLIC BLOOD PRESSURE: 79 MMHG | HEIGHT: 68 IN

## 2018-10-04 DIAGNOSIS — Z47.1 AFTERCARE FOLLOWING LEFT KNEE JOINT REPLACEMENT SURGERY: Primary | ICD-10-CM

## 2018-10-04 DIAGNOSIS — Z96.652 AFTERCARE FOLLOWING LEFT KNEE JOINT REPLACEMENT SURGERY: Primary | ICD-10-CM

## 2018-10-04 DIAGNOSIS — Z96.652 AFTERCARE FOLLOWING LEFT KNEE JOINT REPLACEMENT SURGERY: ICD-10-CM

## 2018-10-04 DIAGNOSIS — Z47.1 AFTERCARE FOLLOWING LEFT KNEE JOINT REPLACEMENT SURGERY: ICD-10-CM

## 2018-10-04 PROCEDURE — 73560 X-RAY EXAM OF KNEE 1 OR 2: CPT

## 2018-10-04 PROCEDURE — 99024 POSTOP FOLLOW-UP VISIT: CPT | Performed by: ORTHOPAEDIC SURGERY

## 2018-10-04 RX ORDER — NABUMETONE 750 MG/1
750 TABLET, FILM COATED ORAL 2 TIMES DAILY
Qty: 60 TABLET | Refills: 2 | Status: SHIPPED | OUTPATIENT
Start: 2018-10-04 | End: 2019-01-10

## 2018-10-04 NOTE — PROGRESS NOTES
Three months following left total knee replacement, this 12year-old male continues describe a daily pain and swelling in his knee  He describes no trauma, nor does he describes febrile episodes  Examination finds gait pattern with very little antalgia  Left hip moves well  Left thighs devoid of atrophy left knee is moderate swelling, and very small effusion  There is very little warmth  Extension is almost full, flexion is greater than 120°  There is no mid flexion valgus instability  There is no tenderness in the left calf  Calf compartments soft and supple  Toes are warm, sensate, mobile  I have personally reviewed x-rays left knee and my interpretation is as follows:  Films left knee show total knee implant satisfactory position, there may be a small lucency beneath the tibial base plate  Assessment/plan:  3 months following left total knee replacement, with well clinical appearance  This patient was encouraged to continue quad strengthening  He was afforded prescription for systemic anti-inflammatories    I would welcome the opportunity see back in 3 months time, this include x-rays two views left knee upon arrival for six-month checkup

## 2018-11-19 ENCOUNTER — TELEPHONE (OUTPATIENT)
Dept: OBGYN CLINIC | Facility: HOSPITAL | Age: 63
End: 2018-11-19

## 2018-11-19 NOTE — TELEPHONE ENCOUNTER
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

## 2018-11-19 NOTE — TELEPHONE ENCOUNTER
Patient sees Dr Nasir Reaves   [phone]    Patient is calling because he is still having a lot pain in your left knee & he has swelling  Patient is stating that you can see through his pants that it is swollen  Patient is stating that it is not red or warm  Patient is stating that it just feels like there's a lot of fluid in there & the whole leg is starting to swell  Patient is stating that it went down a little after his last appt on 10/4/18 but never went down all the way  Patient is not scheduled again until 1/10/19  Please advise

## 2018-11-20 ENCOUNTER — OFFICE VISIT (OUTPATIENT)
Dept: OBGYN CLINIC | Facility: HOSPITAL | Age: 63
End: 2018-11-20
Payer: COMMERCIAL

## 2018-11-20 VITALS
HEART RATE: 73 BPM | HEIGHT: 68 IN | DIASTOLIC BLOOD PRESSURE: 89 MMHG | SYSTOLIC BLOOD PRESSURE: 154 MMHG | WEIGHT: 190 LBS | BODY MASS INDEX: 28.79 KG/M2

## 2018-11-20 DIAGNOSIS — M25.462 PAIN AND SWELLING OF KNEE, LEFT: ICD-10-CM

## 2018-11-20 DIAGNOSIS — M25.562 PAIN AND SWELLING OF KNEE, LEFT: ICD-10-CM

## 2018-11-20 DIAGNOSIS — M25.362 KNEE INSTABILITY, LEFT: ICD-10-CM

## 2018-11-20 DIAGNOSIS — Z96.652 STATUS POST TOTAL LEFT KNEE REPLACEMENT: Primary | ICD-10-CM

## 2018-11-20 PROCEDURE — 99213 OFFICE O/P EST LOW 20 MIN: CPT | Performed by: ORTHOPAEDIC SURGERY

## 2018-11-20 RX ORDER — TRAMADOL HYDROCHLORIDE 50 MG/1
TABLET ORAL
Refills: 0 | COMMUNITY
Start: 2018-10-21

## 2018-11-20 NOTE — PROGRESS NOTES
Subjective;    62-year-old adult male, 5 months status post left total knee replacement  He relates sporadic swelling swelling that occurs in increases during the day to the knee it restricts range of motion and then overnight a majority of it is resorbed  He also describes discomfort with ascending and descending incline planes or stairs in the left knee  He made a phone call to the practice yesterday and we welcome him to the practice today for evaluation examination  Upon entering the room this is a comfortable gentleman that is no visual grimace of acute pain  Past Medical History:   Diagnosis Date    Diabetes mellitus ([address] )     Gout     Hyperlipidemia     Hypertension        Past Surgical History:   Procedure Laterality Date    COLONOSCOPY      GALLBLADDER SURGERY      HEMORRHOID SURGERY      NV TOTAL KNEE ARTHROPLASTY Left 6/20/2018    Procedure: KNEE : COMPLETE REPLACEMENT;  Surgeon: Janet Tejada MD;  Location: [facility];  Service: Orthopedics       Family History   Problem Relation Age of Onset    Diabetes Mother        Social History   Substance Use Topics    Smoking status: Never Smoker    Smokeless tobacco: Never Used    Alcohol use Yes      Comment: social      Exam;    With his trousers removed his exam was performed with him seated on the exam table in standing  He has a well-healed vertical incision overlying the knee  Visual inspection offer suspicion of an effusion which is modest in size  With the knee extended palpation does reveal a fluid collection suprapatellar pouch which has a fluid wave and again is modest in its accumulation it does not restrict range of motion he can extend the knee fully he can flex the knee to well past 90° and there is no formidable collection and a pop heel crease    Of note he has no medial or lateral laxity or instability to varus and valgus stress testing of the extensive knee with the knee flexed 45° there is mild reproducible metal on plastic sound and there is a positive Lachman's and positive drawers test of this knee with anterior to posterior translation or play  Impression; History of left total knee replacement  Chief complaint of sporadic swelling left knee    Plan;  Patients exam was reviewed with the attending surgeon  Given his anterior to posterior translation he was offered consideration of operative poly liner upsize, in the form of revision arthroplasty  The perioperative events were described to him in detail by the attending surgeon he will give time to reflect and have thought and then return back to the office    No interim treatment or medication was required and it was my privilege to assist the attending surgeon in the care this individual   He voiced no further questions and exited the office in fine fashion

## 2019-01-09 RX ORDER — NITROFURANTOIN MACROCRYSTALS 100 MG/1
CAPSULE ORAL
Refills: 0 | COMMUNITY
Start: 2018-12-06

## 2019-01-10 ENCOUNTER — HOSPITAL ENCOUNTER (OUTPATIENT)
Dept: RADIOLOGY | Facility: HOSPITAL | Age: 64
Discharge: HOME | End: 2019-01-10
Attending: ORTHOPAEDIC SURGERY
Payer: COMMERCIAL

## 2019-01-10 VITALS
HEIGHT: 68 IN | HEART RATE: 69 BPM | BODY MASS INDEX: 28.79 KG/M2 | SYSTOLIC BLOOD PRESSURE: 148 MMHG | DIASTOLIC BLOOD PRESSURE: 87 MMHG | WEIGHT: 190 LBS

## 2019-01-10 DIAGNOSIS — Z47.1 AFTERCARE FOLLOWING LEFT KNEE JOINT REPLACEMENT SURGERY: Primary | ICD-10-CM

## 2019-01-10 DIAGNOSIS — Z96.652 AFTERCARE FOLLOWING LEFT KNEE JOINT REPLACEMENT SURGERY: Primary | ICD-10-CM

## 2019-01-10 DIAGNOSIS — Z47.1 AFTERCARE FOLLOWING LEFT KNEE JOINT REPLACEMENT SURGERY: ICD-10-CM

## 2019-01-10 DIAGNOSIS — T84.033A MECHANICAL LOOSENING OF INTERNAL LEFT KNEE PROSTHETIC JOINT, INITIAL ENCOUNTER (HCC): ICD-10-CM

## 2019-01-10 DIAGNOSIS — Z96.652 AFTERCARE FOLLOWING LEFT KNEE JOINT REPLACEMENT SURGERY: ICD-10-CM

## 2019-01-10 DIAGNOSIS — M25.562 LEFT KNEE PAIN, UNSPECIFIED CHRONICITY: ICD-10-CM

## 2019-01-10 PROBLEM — G89.29 CHRONIC KNEE PAIN AFTER TOTAL REPLACEMENT OF LEFT KNEE JOINT: Status: ACTIVE | Noted: 2018-11-20

## 2019-01-10 PROCEDURE — 73560 X-RAY EXAM OF KNEE 1 OR 2: CPT

## 2019-01-10 PROCEDURE — 20610 DRAIN/INJ JOINT/BURSA W/O US: CPT | Performed by: ORTHOPAEDIC SURGERY

## 2019-01-10 PROCEDURE — 99213 OFFICE O/P EST LOW 20 MIN: CPT | Performed by: ORTHOPAEDIC SURGERY

## 2019-01-10 RX ORDER — ASPIRIN 81 MG/1
81 TABLET ORAL
COMMUNITY

## 2019-01-10 RX ORDER — ASCORBIC ACID 500 MG
500 TABLET ORAL
COMMUNITY
Start: 2018-04-26

## 2019-01-10 RX ORDER — FERROUS SULFATE TAB EC 324 MG (65 MG FE EQUIVALENT) 324 (65 FE) MG
324 TABLET DELAYED RESPONSE ORAL
COMMUNITY
Start: 2018-04-26

## 2019-01-10 RX ORDER — AMLODIPINE AND OLMESARTAN MEDOXOMIL 10; 40 MG/1; MG/1
TABLET ORAL
COMMUNITY
Start: 2018-03-18

## 2019-01-10 RX ORDER — FUROSEMIDE 20 MG/1
TABLET ORAL
COMMUNITY
Start: 2018-04-05

## 2019-01-10 RX ORDER — SIMVASTATIN 20 MG
TABLET ORAL
COMMUNITY
Start: 2018-03-18

## 2019-01-10 RX ORDER — NABUMETONE 750 MG/1
750 TABLET, FILM COATED ORAL
COMMUNITY
Start: 2018-10-04

## 2019-01-10 RX ORDER — ALLOPURINOL 100 MG/1
TABLET ORAL
COMMUNITY
Start: 2017-12-17

## 2019-01-10 RX ORDER — METHOCARBAMOL 750 MG/1
750 TABLET, FILM COATED ORAL
COMMUNITY
Start: 2018-08-06

## 2019-01-10 RX ORDER — CARVEDILOL 25 MG/1
TABLET ORAL
COMMUNITY
Start: 2018-03-01

## 2019-01-10 NOTE — PROGRESS NOTES
ASSESSMENT/PLAN:    Assessment:    59-year-old male with left knee pain s/p L TKA six months ago    Plan:   - thorough discussion took place regarding the patient's diagnosis and causes of loosening in the patient's the tibial component  -Synovasure analysis   -patient will receive a phone call for communication fluid analysis        _____________________________________________________  CHIEF COMPLAINT:  Chief Complaint   Patient presents with    Left Knee - Post-op         SUBJECTIVE:  Trinh Mayorga is a 66y o  year old male who presents  With left knee pain  Patient had his knee replaced approximately 6 months ago in 2018  His noted that there has been some pain progressively worsening over the last couple months  He also notices some clicking  Occasionally his knee does swell  No fevers or chills       PAST MEDICAL HISTORY:  Past Medical History:   Diagnosis Date    Diabetes mellitus ([address] )     Gout     Hyperlipidemia     Hypertension        PAST SURGICAL HISTORY:  Past Surgical History:   Procedure Laterality Date    COLONOSCOPY      GALLBLADDER SURGERY      HEMORRHOID SURGERY      ME TOTAL KNEE ARTHROPLASTY Left 6/20/2018    Procedure: KNEE : COMPLETE REPLACEMENT;  Surgeon: Niki Ho MD;  Location: [facility];  Service: Orthopedics       FAMILY HISTORY:  Family History   Problem Relation Age of Onset    Diabetes Mother        SOCIAL HISTORY:  Social History   Substance Use Topics    Smoking status: Never Smoker    Smokeless tobacco: Never Used    Alcohol use Yes      Comment: social        MEDICATIONS:    Current Outpatient Prescriptions:     allopurinol (ZYLOPRIM) 100 mg tablet, TK 1 T PO  BID, Disp: , Rfl:     amlodipine-olmesartan (KALIN) 10-40 MG, TK 1 T PO QD, Disp: , Rfl:     ascorbic acid (VITAMIN C) 500 MG tablet, Take 500 mg by mouth, Disp: , Rfl:     carvedilol (COREG) 25 mg tablet, TK 1 T PO BID WITH FOOD, Disp: , Rfl:     ferrous sulfate 324 (65 Fe) mg, Take 324 mg by mouth, Disp: , Rfl:     furosemide (LASIX) 20 mg tablet, TK 1 T PO  D, Disp: , Rfl:     metFORMIN (GLUCOPHAGE) 500 mg tablet, 1 tab po daily, Disp: , Rfl:     methocarbamol (ROBAXIN) 750 mg tablet, Take 750 mg by mouth, Disp: , Rfl:     nabumetone (RELAFEN) 750 mg tablet, Take 750 mg by mouth, Disp: , Rfl:     simvastatin (ZOCOR) 20 mg tablet, TK 1 T PO QD IN THE EVENING AFTER SUPPER, Disp: , Rfl:     ascorbic Acid (VITAMIN C) 500 MG CPCR, Take 1 capsule (500 mg total) by mouth daily, Disp: 30 capsule, Rfl: 0    aspirin (ECOTRIN LOW STRENGTH) 81 mg EC tablet, Take 81 mg by mouth every other day  , Disp: , Rfl:     aspirin (ECOTRIN LOW STRENGTH) 81 mg EC tablet, Take 81 mg by mouth, Disp: , Rfl:     Continuous Blood Gluc  (FREESTYLE YEIMY READER) MARQUISE, USE AND APPLY SENSOR EVERY 10 DAYS, Disp: , Rfl: 3    Continuous Blood Gluc Sensor (FREESTYLE YEIMY SENSOR SYSTEM) MISC, APPLY 1 SENSOR EVERY 10 DAYS, Disp: , Rfl: 3    docusate sodium (COLACE) 100 mg capsule, Take 1 capsule (100 mg total) by mouth 2 (two) times a day, Disp: 10 capsule, Rfl: 0    naproxen (NAPROSYN) 500 mg tablet, TK 1 T PO  D AFTER FOOD  prn, Disp: , Rfl: 1    nitrofurantoin (MACRODANTIN) 100 mg capsule, TK 1 C PO BID WF, Disp: , Rfl: 0    oxyCODONE (OxyCONTIN) 10 mg 12 hr tablet, Take 1/2 pill q 12 hours p r n , Disp: 42 tablet, Rfl: 0    traMADol (ULTRAM) 50 mg tablet, TK 1 T PO BID, Disp: , Rfl: 0    ALLERGIES:  Allergies   Allergen Reactions    Acetaminophen Hives     Other reaction(s): Other (See Comments)  HIVES       REVIEW OF SYSTEMS:  Pertinent items are noted in HPI  A comprehensive review of systems was negative      LABS:  HgA1c:   Lab Results   Component Value Date    HGBA1C 5 7 05/17/2018     BMP:   Lab Results   Component Value Date    CALCIUM 9 1 06/25/2018    K 3 7 06/25/2018    CO2 33 (H) 06/25/2018    CL 95 (L) 06/25/2018    BUN 8 06/25/2018    CREATININE 0 94 06/25/2018 _____________________________________________________  PHYSICAL EXAMINATION:  General: well developed and well nourished, alert, oriented times 3 and appears comfortable  Psychiatric: Normal  HEENT: Trachea Midline, No torticollis  Cardiovascular: No discernable arrhythmia  Pulmonary: No wheezing or stridor  Skin: No masses, erthema, lacerations, fluctation, ulcerations  Neurovascular: Sensation Intact to the Median, Ulnar, Radial Nerve, Motor Intact to the Median, Ulnar, Radial Nerve and Pulses Intact    MUSCULOSKELETAL EXAMINATION:    Left knee today:  Extension 0, flexion 120, stable to varus valgus stress at 0 and 30, some tenderness in the proximal tibial region both laterally and medially, also some tenderness around the medial patellar retinaculur  region      _____________________________________________________  STUDIES REVIEWED:  Left knee today:  Bone cement interface lucency is noted in the medial lateral aspect of proximal tibia, also a bone cement interface lucency noted in the lateral aspect of the distal femur      PROCEDURES PERFORMED:  Large joint arthrocentesis  Date/Time: 1/10/2019 4:36 PM  Consent given by: patient  Supporting Documentation  Indications: pain   Procedure Details  Location: knee - L knee  Preparation: Patient was prepped and draped in the usual sterile fashion  Needle size: 18 G  Ultrasound guidance: no           50 cc of serosanguineous fluid was aspirated from the superolateral aspect of the patient's left knee using Betadine for prep and 18 gauge needle  Patient tolerated the procedure well and was given a Band-Aid     Samples were sent for synovasure analysis

## 2023-09-06 NOTE — TELEPHONE ENCOUNTER
Please direct this to someone who is responsible for obtaining prior authorization  I do not think that is me    Thank you Dressing: dry sterile dressing

## (undated) DEVICE — CUFF TOURNIQUET 30 X 4 IN QUICK CONNECT DISP 1BLA

## (undated) DEVICE — 3M™ IOBAN™ 2 ANTIMICROBIAL INCISE DRAPE 6650EZ: Brand: IOBAN™ 2

## (undated) DEVICE — GAUZE SPONGES,16 PLY: Brand: CURITY

## (undated) DEVICE — COOL TEMP PAD

## (undated) DEVICE — 5065 IMPAD REGULAR PAIR: Brand: A-V IMPULSE

## (undated) DEVICE — SUT VICRYL PLUS 2-0 CTB-1 27 IN VCPB259H

## (undated) DEVICE — GLOVE SRG BIOGEL 8

## (undated) DEVICE — DUAL CUT SAGITTAL BLADE

## (undated) DEVICE — INTENDED FOR TISSUE SEPARATION, AND OTHER PROCEDURES THAT REQUIRE A SHARP SURGICAL BLADE TO PUNCTURE OR CUT.: Brand: BARD-PARKER SAFETY BLADES SIZE 10, STERILE

## (undated) DEVICE — ACE WRAP 6 IN UNSTERILE

## (undated) DEVICE — CAPIT KNEE ATTUNE FB CEMENT - DEPUY

## (undated) DEVICE — BETHLEHEM UNIV TOTAL KNEE, KIT: Brand: CARDINAL HEALTH

## (undated) DEVICE — SILVER-COATED ANTIMICROBIAL BARRIER DRESSING: Brand: ACTICOAT   4" X 8"

## (undated) DEVICE — ACE WRAP 6 IN STERILE

## (undated) DEVICE — STOCKINETTE REGULAR

## (undated) DEVICE — JP 3-SPRING RES W/10FR PVC DRAIN/TR: Brand: CARDINAL HEALTH

## (undated) DEVICE — GLOVE INDICATOR PI UNDERGLOVE SZ 8.5 BLUE

## (undated) DEVICE — ABDOMINAL PAD: Brand: DERMACEA

## (undated) DEVICE — THE SIMPULSE SOLO SYSTEM WITH ULTREX RETRACTABLE SPLASH SHIELD TIP: Brand: SIMPULSE SOLO

## (undated) DEVICE — CHLORAPREP HI-LITE 26ML ORANGE

## (undated) DEVICE — SPONGE GAUZE 4 X 9

## (undated) DEVICE — TRAY FOLEY 16FR URIMETER SURESTEP

## (undated) DEVICE — SPONGE PVP SCRUB WING STERILE

## (undated) DEVICE — PLUMEPEN PRO 10FT

## (undated) DEVICE — HOOD: Brand: FLYTE, SURGICOOL

## (undated) DEVICE — NO-SCRATCH ™ SMALL WHITNEY CURETTE ™ IS A SINGLE-USE, PLASTIC CURETTE FOR QUICKLY APPLYING, MANIPULATING AND REMOVING BONE CEMENT DURING HIP AND KNEE REPLACEMENT SURGERY. THE PLASTIC IS SOFTER THAN STEEL INSTRUMENTS, REDUCING THE RISK OF DAMAGING THE PROSTHESIS WITH METAL INSTRUMENTS.  THE CURETTE’S 6MM TIP REMOVES EXCESS CEMENT FROM REPLACEMENT HIPS AND KNEES. EASY-TO-MANEUVER, THE SMALL BLUE CURETTE LETS YOU REMOVE CEMENT FROM ALL EDGES OF THE PROSTHESIS.NO-SCRATCH WHITNEY SMALL CURETTE FEATURES:SAFER THAN STEEL- MADE OF PLASTIC - STURDY YET SOFTER THAN SURGICAL STEEL.HANDIER- EACH TOOL HAS A MOLDED-IN THUMB INDENTATION INSTANTLY ORIENTING THE TOOL.- EASIER TO MANEUVER IN HARD TO SEE PLACES.- COLOR-CODED FOR EASY IDENTIFICATION.FASTER- COMES INDIVIDUALLY PACKAGED IN STERILE, PEEL OPEN POUCH, READY TO GO.- APPLIES, MANIPULATES, OR REMOVES CEMENT WITH FINGERTIP PRECISION.ECONOMICAL- THE COST OF A SINGLE REVISION DWARFS THE COST OF A SINGLE-USE CURETTE. - DISPOSABLE – THERE’S NO NEED TO WASTE TIME REMOVING HARDENED CEMENT OR RE-STERILIZING TOOLS.- LESS EXPENSIVE TO BUY AND INVENTORY - ORDER ONLY THE TOOL YOU USE.- PACKAGED 25 INDIVIDUALLY WRAPPED TOOLS TO A CARTON FOR CONVENIENT SHELF STORAGE.: Brand: WHITNEY NO-SCRATCH CURETTE (SMALL)

## (undated) DEVICE — SPINNING CEMENT MIXING BOWL

## (undated) DEVICE — RECIPROCATING BLADE, DOUBLE SIDED (70.0 X 0.96 X 12.5MM)

## (undated) DEVICE — REM POLYHESIVE ADULT PATIENT RETURN ELECTRODE: Brand: VALLEYLAB

## (undated) DEVICE — SUT VICRYL PLUS 1 CTB-1 36 IN VCPB947H

## (undated) DEVICE — DRAPE SHEET X-LG

## (undated) DEVICE — PADDING CAST 4 IN  COTTON STRL